# Patient Record
Sex: FEMALE | Race: WHITE | NOT HISPANIC OR LATINO | Employment: FULL TIME | URBAN - METROPOLITAN AREA
[De-identification: names, ages, dates, MRNs, and addresses within clinical notes are randomized per-mention and may not be internally consistent; named-entity substitution may affect disease eponyms.]

---

## 2020-09-24 ENCOUNTER — TELEPHONE (OUTPATIENT)
Dept: OBGYN CLINIC | Facility: CLINIC | Age: 25
End: 2020-09-24

## 2020-09-24 NOTE — TELEPHONE ENCOUNTER
Spoke to [employer]  In network  Ins  Eff  4-1-2020  No copay  Ded  $2500  Oop $6850    $[phone] applied  Co ins   70/30  [phone]

## 2020-10-14 ENCOUNTER — INITIAL PRENATAL (OUTPATIENT)
Dept: OBGYN CLINIC | Facility: CLINIC | Age: 25
End: 2020-10-14

## 2020-10-14 VITALS — WEIGHT: 163 LBS | TEMPERATURE: 98.7 F

## 2020-10-14 DIAGNOSIS — Z3A.08 8 WEEKS GESTATION OF PREGNANCY: Primary | ICD-10-CM

## 2020-10-14 PROCEDURE — 87491 CHLMYD TRACH DNA AMP PROBE: CPT | Performed by: PHYSICIAN ASSISTANT

## 2020-10-14 PROCEDURE — 87591 N.GONORRHOEAE DNA AMP PROB: CPT | Performed by: PHYSICIAN ASSISTANT

## 2020-10-14 PROCEDURE — 87661 TRICHOMONAS VAGINALIS AMPLIF: CPT | Performed by: PHYSICIAN ASSISTANT

## 2020-10-14 PROCEDURE — G0145 SCR C/V CYTO,THINLAYER,RESCR: HCPCS | Performed by: PHYSICIAN ASSISTANT

## 2020-10-14 PROCEDURE — PNV: Performed by: PHYSICIAN ASSISTANT

## 2020-10-17 LAB
C TRACH DNA SPEC QL NAA+PROBE: NEGATIVE
N GONORRHOEA DNA SPEC QL NAA+PROBE: NEGATIVE
T VAGINALIS RRNA SPEC QL NAA+PROBE: NEGATIVE

## 2020-10-19 ENCOUNTER — TELEPHONE (OUTPATIENT)
Dept: OBGYN CLINIC | Facility: CLINIC | Age: 25
End: 2020-10-19

## 2020-10-19 LAB
EXTERNAL HEPATITIS B SURFACE ANTIGEN: NON REACTIVE
EXTERNAL HIV-1/2 AB-AG: NORMAL
EXTERNAL RUBELLA IGG QUANTITATION: NORMAL
EXTERNAL SYPHILIS RPR SCREEN: NORMAL

## 2020-10-21 LAB
ABO GROUP BLD: NORMAL
APPEARANCE UR: CLEAR
BACTERIA UR CULT: NORMAL
BACTERIA URNS QL MICRO: NORMAL
BASOPHILS # BLD AUTO: 0 X10E3/UL (ref 0–0.2)
BASOPHILS NFR BLD AUTO: 0 %
BILIRUB UR QL STRIP: NEGATIVE
BLD GP AB SCN SERPL QL: NEGATIVE
COLOR UR: YELLOW
EOSINOPHIL # BLD AUTO: 0.1 X10E3/UL (ref 0–0.4)
EOSINOPHIL NFR BLD AUTO: 1 %
EPI CELLS #/AREA URNS HPF: NORMAL /HPF (ref 0–10)
ERYTHROCYTE [DISTWIDTH] IN BLOOD BY AUTOMATED COUNT: 12 % (ref 11.7–15.4)
GLUCOSE UR QL: NEGATIVE
HBV SURFACE AG SERPL QL IA: NEGATIVE
HCT VFR BLD AUTO: 39.7 % (ref 34–46.6)
HCV AB S/CO SERPL IA: <0.1 S/CO RATIO (ref 0–0.9)
HGB BLD-MCNC: 13.7 G/DL (ref 11.1–15.9)
HGB UR QL STRIP: NEGATIVE
HIV 1+2 AB+HIV1 P24 AG SERPL QL IA: NON REACTIVE
IMM GRANULOCYTES # BLD: 0 X10E3/UL (ref 0–0.1)
IMM GRANULOCYTES NFR BLD: 0 %
KETONES UR QL STRIP: NEGATIVE
LAB AP GYN PRIMARY INTERPRETATION: NORMAL
LEUKOCYTE ESTERASE UR QL STRIP: NEGATIVE
LYMPHOCYTES # BLD AUTO: 1.1 X10E3/UL (ref 0.7–3.1)
LYMPHOCYTES NFR BLD AUTO: 15 %
Lab: NO GROWTH
Lab: NORMAL
MCH RBC QN AUTO: 32.9 PG (ref 26.6–33)
MCHC RBC AUTO-ENTMCNC: 34.5 G/DL (ref 31.5–35.7)
MCV RBC AUTO: 95 FL (ref 79–97)
MICRO URNS: NORMAL
MICRO URNS: NORMAL
MONOCYTES # BLD AUTO: 0.4 X10E3/UL (ref 0.1–0.9)
MONOCYTES NFR BLD AUTO: 6 %
NEUTROPHILS # BLD AUTO: 5.6 X10E3/UL (ref 1.4–7)
NEUTROPHILS NFR BLD AUTO: 78 %
NITRITE UR QL STRIP: NEGATIVE
PH UR STRIP: 6.5 [PH] (ref 5–7.5)
PLATELET # BLD AUTO: 242 X10E3/UL (ref 150–450)
PROT UR QL STRIP: NEGATIVE
RBC # BLD AUTO: 4.16 X10E6/UL (ref 3.77–5.28)
RBC #/AREA URNS HPF: NORMAL /HPF (ref 0–2)
RH BLD: POSITIVE
RPR SER QL: NON REACTIVE
RUBV IGG SERPL IA-ACNC: 11.4 INDEX
SP GR UR: 1.01 (ref 1–1.03)
UROBILINOGEN UR STRIP-ACNC: 0.2 MG/DL (ref 0.2–1)
WBC # BLD AUTO: 7.3 X10E3/UL (ref 3.4–10.8)
WBC #/AREA URNS HPF: NORMAL /HPF (ref 0–5)

## 2020-11-09 ENCOUNTER — ROUTINE PRENATAL (OUTPATIENT)
Dept: OBGYN CLINIC | Facility: CLINIC | Age: 25
End: 2020-11-09

## 2020-11-09 VITALS — WEIGHT: 163 LBS | DIASTOLIC BLOOD PRESSURE: 60 MMHG | SYSTOLIC BLOOD PRESSURE: 100 MMHG | TEMPERATURE: 98 F

## 2020-11-09 DIAGNOSIS — Z3A.14 PREGNANCY WITH 14 COMPLETED WEEKS GESTATION: Primary | ICD-10-CM

## 2020-11-09 PROCEDURE — PNV: Performed by: OBSTETRICS & GYNECOLOGY

## 2020-12-07 ENCOUNTER — ROUTINE PRENATAL (OUTPATIENT)
Dept: OBGYN CLINIC | Facility: CLINIC | Age: 25
End: 2020-12-07

## 2020-12-07 VITALS — DIASTOLIC BLOOD PRESSURE: 60 MMHG | SYSTOLIC BLOOD PRESSURE: 100 MMHG | WEIGHT: 165 LBS | TEMPERATURE: 97.4 F

## 2020-12-07 DIAGNOSIS — Z3A.18 PREGNANCY WITH 18 COMPLETED WEEKS GESTATION: Primary | ICD-10-CM

## 2020-12-07 PROCEDURE — PNV: Performed by: OBSTETRICS & GYNECOLOGY

## 2020-12-28 ENCOUNTER — TELEPHONE (OUTPATIENT)
Dept: PERINATAL CARE | Facility: CLINIC | Age: 25
End: 2020-12-28

## 2020-12-29 ENCOUNTER — ROUTINE PRENATAL (OUTPATIENT)
Dept: PERINATAL CARE | Facility: CLINIC | Age: 25
End: 2020-12-29
Payer: COMMERCIAL

## 2020-12-29 VITALS
WEIGHT: 168 LBS | BODY MASS INDEX: 29.77 KG/M2 | HEART RATE: 111 BPM | HEIGHT: 63 IN | DIASTOLIC BLOOD PRESSURE: 72 MMHG | SYSTOLIC BLOOD PRESSURE: 115 MMHG

## 2020-12-29 DIAGNOSIS — Z36.86 ENCOUNTER FOR ANTENATAL SCREENING FOR CERVICAL LENGTH: ICD-10-CM

## 2020-12-29 DIAGNOSIS — Z36.3 ENCOUNTER FOR ANTENATAL SCREENING FOR MALFORMATIONS: Primary | ICD-10-CM

## 2020-12-29 DIAGNOSIS — Z3A.21 21 WEEKS GESTATION OF PREGNANCY: ICD-10-CM

## 2020-12-29 PROCEDURE — 99213 OFFICE O/P EST LOW 20 MIN: CPT | Performed by: OBSTETRICS & GYNECOLOGY

## 2020-12-29 PROCEDURE — 76817 TRANSVAGINAL US OBSTETRIC: CPT | Performed by: OBSTETRICS & GYNECOLOGY

## 2020-12-29 PROCEDURE — 76805 OB US >/= 14 WKS SNGL FETUS: CPT | Performed by: OBSTETRICS & GYNECOLOGY

## 2021-01-04 ENCOUNTER — ROUTINE PRENATAL (OUTPATIENT)
Dept: OBGYN CLINIC | Facility: CLINIC | Age: 26
End: 2021-01-04

## 2021-01-04 VITALS — DIASTOLIC BLOOD PRESSURE: 68 MMHG | SYSTOLIC BLOOD PRESSURE: 120 MMHG | BODY MASS INDEX: 30.29 KG/M2 | WEIGHT: 171 LBS

## 2021-01-04 DIAGNOSIS — Z34.92 SECOND TRIMESTER PREGNANCY: Primary | ICD-10-CM

## 2021-01-04 PROCEDURE — PNV: Performed by: PHYSICIAN ASSISTANT

## 2021-01-04 NOTE — PROGRESS NOTES
Pt feels well  Had anatomy scan last week at [facility]  Some missed cardiac views, otherwise all WNL  Pt has decided against QS and AFP  Declines flu  Reviewed recommendations for flu and tdap    + daily FM noted  No cramping, no vb/lof, no n/v/ha   No edema,   Urine neg/neg

## 2021-02-05 ENCOUNTER — ROUTINE PRENATAL (OUTPATIENT)
Dept: OBGYN CLINIC | Facility: CLINIC | Age: 26
End: 2021-02-05

## 2021-02-05 ENCOUNTER — HOSPITAL ENCOUNTER (OUTPATIENT)
Facility: HOSPITAL | Age: 26
Discharge: HOME | End: 2021-02-05
Attending: OBSTETRICS & GYNECOLOGY | Admitting: OBSTETRICS & GYNECOLOGY
Payer: COMMERCIAL

## 2021-02-05 ENCOUNTER — NURSE TRIAGE (OUTPATIENT)
Dept: OTHER | Facility: OTHER | Age: 26
End: 2021-02-05

## 2021-02-05 VITALS
TEMPERATURE: 97.1 F | BODY MASS INDEX: 32.42 KG/M2 | DIASTOLIC BLOOD PRESSURE: 80 MMHG | SYSTOLIC BLOOD PRESSURE: 130 MMHG | WEIGHT: 183 LBS

## 2021-02-05 VITALS
BODY MASS INDEX: 32.43 KG/M2 | SYSTOLIC BLOOD PRESSURE: 142 MMHG | OXYGEN SATURATION: 100 % | WEIGHT: 183 LBS | DIASTOLIC BLOOD PRESSURE: 76 MMHG | HEIGHT: 63 IN | RESPIRATION RATE: 16 BRPM | TEMPERATURE: 98.7 F | HEART RATE: 88 BPM

## 2021-02-05 DIAGNOSIS — Z34.02 ENCOUNTER FOR SUPERVISION OF NORMAL FIRST PREGNANCY IN SECOND TRIMESTER: Primary | ICD-10-CM

## 2021-02-05 PROBLEM — Z3A.26 26 WEEKS GESTATION OF PREGNANCY: Status: ACTIVE | Noted: 2021-02-05

## 2021-02-05 LAB
BACTERIA UR QL AUTO: NORMAL /HPF
BILIRUB UR QL STRIP: NEGATIVE
CLARITY UR: CLEAR
COLOR UR: YELLOW
GLUCOSE UR STRIP-MCNC: NEGATIVE MG/DL
HGB UR QL STRIP.AUTO: NEGATIVE
KETONES UR STRIP-MCNC: NEGATIVE MG/DL
LEUKOCYTE ESTERASE UR QL STRIP: NEGATIVE
NITRITE UR QL STRIP: NEGATIVE
NON-SQ EPI CELLS URNS QL MICRO: NORMAL /HPF
PH UR STRIP.AUTO: 6 [PH]
PROT UR STRIP-MCNC: NEGATIVE MG/DL
RBC #/AREA URNS AUTO: NORMAL /HPF
SP GR UR STRIP.AUTO: <=1.005 (ref 1–1.03)
UROBILINOGEN UR QL STRIP.AUTO: 0.2 E.U./DL
WBC #/AREA URNS AUTO: NORMAL /HPF

## 2021-02-05 PROCEDURE — 76815 OB US LIMITED FETUS(S): CPT | Performed by: OBSTETRICS & GYNECOLOGY

## 2021-02-05 PROCEDURE — 99214 OFFICE O/P EST MOD 30 MIN: CPT

## 2021-02-05 PROCEDURE — NC001 PR NO CHARGE: Performed by: OBSTETRICS & GYNECOLOGY

## 2021-02-05 PROCEDURE — 81001 URINALYSIS AUTO W/SCOPE: CPT | Performed by: OBSTETRICS & GYNECOLOGY

## 2021-02-05 PROCEDURE — PNV: Performed by: NURSE PRACTITIONER

## 2021-02-05 NOTE — PROGRESS NOTES
OFFICE VISIT: Denies any N/V, HA, Cramping, VB, LOF, Edema, Domestic Violence, Smoking  + FM  Tolerating PNV  Has repeat scan for missed anatomy scheduled  Rx for 28 labs given  RTO 2 weeks or sooner as needed

## 2021-02-05 NOTE — TELEPHONE ENCOUNTER
Regarding: Possible amniotic leak  ----- Message from Yoseph Hernandez sent at 2/5/2021  5:57 PM EST -----  "I'm currently 26 weeks pregnant and I think my amniotic fluid is leaking   I started to notice this yesterday "

## 2021-02-05 NOTE — TELEPHONE ENCOUNTER
Reason for Disposition   Possible incontinence of urine, BUT patient uncertain    Additional Information   Leakage of fluid (trickle, gush) from the vagina    Answer Assessment - Initial Assessment Questions  1  DISCHARGE: "Describe the discharge " (e g , white, yellow, green, gray, foamy, cottage cheese-like)      Clear, wet underwear, "not a lot"  2  ODOR: "Is there a bad odor?"      Denies  3  ONSET: "When did the discharge begin?"      Yesterday  4  RASH: "Is there a rash in that area?" If so, ask: "Describe it " (e g , redness, blisters, sores, bumps)      Denies  5  ABDOMINAL PAIN: "Are you having any abdominal pain?" If yes: "What does it feel like?" (e g , crampy, dull, intermittent, constant)       Denies  6  CAUSE: "What do you think is causing the discharge?"      Amniotic fluid  7  OTHER SYMPTOMS: "Do you have any other symptoms?" (e g , fever, itching, vaginal bleeding, pain with urination)      Denies  8  EMMANUEL: "What date are you expecting to deliver?"       5/9/2021  9   PREGNANCY: "How many weeks pregnant are you?"        26 weeks    Protocols used: PREGNANCY - RUPTURE OF MEMBRANES-ADULT-, PREGNANCY - VAGINAL DISCHARGE-ADULT-AH

## 2021-02-05 NOTE — TELEPHONE ENCOUNTER
Reason for Disposition   Leakage of fluid from vagina    Protocols used: PREGNANCY - RUPTURE OF [facility]

## 2021-02-06 NOTE — PROGRESS NOTES
L&D Triage Note - OB/GYN  León Amador 30 y o  female MRN: [id number]  Unit/Bed#:  TRIAGE 2 Encounter: [id number]    León Amador is a patient of [facility]    SUBJECTIVE    EMMANUEL: Estimated Date of Delivery: 21    HPI:  30 y o   26w5d presents with complaint of leaking fluid since yesterday  Patient has required a pad  She states the fluid is odorless and colorless  She denies any recent intercourse dysuria irritation foul discharge or bleeding  She reports good fetal movement and denies contractions  Her current obstetrical history is uncomplicated      ROS:  Constitutional: Negative  Respiratory: Negative  Cardiovascular: Negative    Gastrointestinal: Negative    OBJECTIVE:  /76 (BP Location: Right arm)   Pulse 88   Temp 98 7 °F (37 1 °C) (Oral)   Resp 16   Ht 5' 3" (1 6 m)   Wt 83 kg (183 lb)   LMP 2020 (Exact Date)   SpO2 100%   BMI 32 42 kg/m²   Body mass index is 32 42 kg/m²  Physical Exam  Constitutional:       Appearance: Normal appearance  Cardiovascular:      Rate and Rhythm: Normal rate and regular rhythm  Pulmonary:      Breath sounds: Normal breath sounds  Abdominal:      General: There is no distension  Palpations: Abdomen is soft  Neurological:      Mental Status: She is alert  Speculum exam:  No pooling is noted on speculum exam   Cervix is visualized and is closed  No leaking from the cervix with Valsalva       [address]:     Infection:   - negative clue cells    - negative hyphae   - negative trichomonads present    Membrane status   - negative ferning   - negative nitrazene   - negative pooling     SVE: c/th/hi  Method: Manual  OB Examiner: DeFruscio    FHT:  Baseline Rate: 135 bpm  Variability: Moderate 6-25 bpm  Accelerations: 15 x 15 or greater  Decelerations: None  FHR Category: Category I    TOCO:   Contraction Frequency (minutes): None    IMAGING:       TAUS   RACHAEL:15     Labs: No results found for this or any previous visit (from the past 24 hour(s))  Patient is seen by Dr Vi Arteagaw is a 30 y o   at 26w5d here for rule out rupture    PLAN  #1  Rule out rupture  · Fetal heart tracing  · Ruffin  · UA  · KOH/wet prep  · Ferning/pulling/nitrazine- negative  · RACHAEL reassuring    Rupture ruled out at this time  Her workup is negative and RACHAEL is reassuring at 15  Her fetal heart tracing is reactive and there are no contractions noted on tocometry  Her UA is negative  Discharge instructions  · Patient instructed to call if experiencing worsening contractions, vaginal bleeding, loss of fluid or decreased fetal movement  · Will follow up with OBGYN as scheduled      D/w Dr Al Garcia MD  OB/GYN PGY-1  2021  9:44 PM

## 2021-02-06 NOTE — PROCEDURES
Sara Roman, a  at 26w5d with an EMMANUEL of 2021, by Last Menstrual Period, was seen at [address] for the following procedure(s): $Procedure Type: RACHAEL]         4 Quadrant RACHAEL  RACHAEL Q1 (cm): 4 cm  RACHAEL Q2 (cm): 4 7 cm  RACHAEL Q3 (cm): 4 7 cm  RACHAEL Q4 (cm): 2 3 cm  RACHAEL TOTAL (cm): 15 7 cm

## 2021-02-11 LAB
BASOPHILS # BLD AUTO: 0 X10E3/UL (ref 0–0.2)
BASOPHILS NFR BLD AUTO: 0 %
EOSINOPHIL # BLD AUTO: 0 X10E3/UL (ref 0–0.4)
EOSINOPHIL NFR BLD AUTO: 0 %
ERYTHROCYTE [DISTWIDTH] IN BLOOD BY AUTOMATED COUNT: 12.9 % (ref 11.7–15.4)
GLUCOSE 1H P 50 G GLC PO SERPL-MCNC: 129 MG/DL (ref 65–139)
HCT VFR BLD AUTO: 34.1 % (ref 34–46.6)
HGB BLD-MCNC: 11.5 G/DL (ref 11.1–15.9)
IMM GRANULOCYTES # BLD: 0 X10E3/UL (ref 0–0.1)
IMM GRANULOCYTES NFR BLD: 0 %
LYMPHOCYTES # BLD AUTO: 0.8 X10E3/UL (ref 0.7–3.1)
LYMPHOCYTES NFR BLD AUTO: 9 %
MCH RBC QN AUTO: 33.2 PG (ref 26.6–33)
MCHC RBC AUTO-ENTMCNC: 33.7 G/DL (ref 31.5–35.7)
MCV RBC AUTO: 99 FL (ref 79–97)
MONOCYTES # BLD AUTO: 0.4 X10E3/UL (ref 0.1–0.9)
MONOCYTES NFR BLD AUTO: 5 %
NEUTROPHILS # BLD AUTO: 6.9 X10E3/UL (ref 1.4–7)
NEUTROPHILS NFR BLD AUTO: 86 %
PLATELET # BLD AUTO: 209 X10E3/UL (ref 150–450)
RBC # BLD AUTO: 3.46 X10E6/UL (ref 3.77–5.28)
WBC # BLD AUTO: 8.1 X10E3/UL (ref 3.4–10.8)

## 2021-02-17 ENCOUNTER — ROUTINE PRENATAL (OUTPATIENT)
Dept: OBGYN CLINIC | Facility: CLINIC | Age: 26
End: 2021-02-17

## 2021-02-17 VITALS — DIASTOLIC BLOOD PRESSURE: 78 MMHG | BODY MASS INDEX: 32.59 KG/M2 | WEIGHT: 184 LBS | SYSTOLIC BLOOD PRESSURE: 128 MMHG

## 2021-02-17 DIAGNOSIS — Z34.03 ENCOUNTER FOR SUPERVISION OF NORMAL FIRST PREGNANCY IN THIRD TRIMESTER: Primary | ICD-10-CM

## 2021-02-17 PROCEDURE — PNV: Performed by: NURSE PRACTITIONER

## 2021-02-17 NOTE — PROGRESS NOTES
OFFICE VISIT: Denies any N/V, HA, Cramping, VB, LOF, Edema, Domestic Violence, Smoking  + FM  Tolerating PNV  States sharp pain in bottom of her foot when walking barefoot  Does not remember any injury, denies any swelling  Recommend seeing PCP  28 week folder reviewed  Has upcoming ultrasound for missed anatomy  Urine neg/neg  RTO 2 weeks or sooner as needed

## 2021-03-03 ENCOUNTER — ROUTINE PRENATAL (OUTPATIENT)
Dept: OBGYN CLINIC | Facility: CLINIC | Age: 26
End: 2021-03-03

## 2021-03-03 VITALS
WEIGHT: 192 LBS | SYSTOLIC BLOOD PRESSURE: 124 MMHG | DIASTOLIC BLOOD PRESSURE: 70 MMHG | TEMPERATURE: 97.7 F | BODY MASS INDEX: 34.01 KG/M2

## 2021-03-03 DIAGNOSIS — Z3A.30 30 WEEKS GESTATION OF PREGNANCY: ICD-10-CM

## 2021-03-03 DIAGNOSIS — Z34.03 ENCOUNTER FOR PRENATAL CARE IN THIRD TRIMESTER OF FIRST PREGNANCY: Primary | ICD-10-CM

## 2021-03-03 PROCEDURE — PNV: Performed by: OBSTETRICS & GYNECOLOGY

## 2021-03-03 NOTE — PATIENT INSTRUCTIONS
Vaccine Decision Making  Im pregnant  Should I get a COVID vaccine? The information here is about the [place] and Moderna COVID-19 vaccines  These are also called mRNA vaccines  Reference numbers written like this (#) correspond to the footnotes at the end of this section  For most people, getting the COVID vaccine as soon as possible is the safest choice  However, these vaccines have not been tested in pregnant and breastfeeding women yet  The information below will help you make an informed choice about whether to get an mRNA COVID vaccine while you are pregnant or trying to get pregnant  Your options:   Get a COVID vaccine as soon as it is available    Wait for more information about the vaccines in pregnancy    What are the benefits of getting an mRNA COVID Vaccine? 1  COVID is dangerous  It is more dangerous for pregnant women   COVID patients who are pregnant are 5 times more likely to end up in the intensive care unit (ICU) or on a ventilator than COVID patients who are not pregnant  (#1)    birth may be more common for pregnant women with severe COVID  (#2)   Pregnant women are more likely to die of COVID than non-pregnant women with COVID who are the same age  (#3,4)    2  The mRNA COVID vaccines prevent about 95% of COVID infections   As COVID infections go up in our communities, your risk of getting COVID goes up too   Getting a vaccine will prevent you from getting COVID and may help keep you from giving COVID to people around you, like your family  3  The mRNA COVID vaccines cannot give you COVID   These vaccines have no live virus  (#5)   These vaccines do NOT contain ingredients that are known to be harmful to pregnant women or to the fetus   Many vaccines are routinely given in pregnancy and are safe (for example: tetanus, diphtheria, and flu)    More details about how these vaccines work can be found below in the section "More information about mRNA COVID vaccines"  What are the risks of getting an mRNA COVID vaccine? 1  These COVID vaccines have not yet been tested in pregnant people   These vaccines were tested in over 40,000 people, and there were no serious side effects related to the vaccine   We do not know if the vaccines work as well in pregnant people as they did in non-pregnant people   We do not know whether there are unique downsides in pregnancy, like different side effects or an increased risk of miscarriage or fetal abnormalities   The Moderna vaccine was tested in female rats to look at its effects on pregnancy  No significant negative effects were found on female fertility or fetal development   Some women became pregnant during the vaccine studies  Eighteen of these women were in the vaccine group, and two months later none had miscarried  There were [de-identified] women in the placebo group who became pregnant, and two months later two of them had had miscarriages  (In general, 10-20% of pregnancies end in miscarriage)   Because these studies are still ongoing, we dont know how the rest of the pregnancy went for these women  2  People getting the vaccine will probably have some side effects   Many people had symptoms caused by their immune systems normal response to the vaccine  The most common side effects were:(#6)   injection site reactions like sore arm (~84%)   fatigue (~62%)   headache (~55%)   muscle pain (~38%)   chills (~32%)   joint pain (~24%)   fever (~14%)   Of 100 people who get the vaccine, 1 will get a high fever (over 102°F)  A persistent high fever during the first trimester might increase the risk of fetal abnormalities or miscarriage  The CDC recommends using Tylenol (acetaminophen) during pregnancy if you have a high fever  Another option is to delay your COVID vaccine until after the first trimester  What do the experts recommend?   Because COVID is dangerous and easily spread, the CDC says that the mRNA vaccines for COVID-19 are recommended for adults  (#7)  However, because there are no studies of pregnant women yet, there are no clear recommendations for pregnant women  This is standard for a new drug and is not due to any particular concern with this vaccine  The Society for Maternal-Fetal Medicine strongly recommends that pregnant individuals have access to COVID vaccines  They recommend that each person talk to their doctor or midwife about their own personal choice  (#8)    The [address] and Gynecologists recommends that the COVID vaccine should not be withheld from pregnant individuals  (#9)    What else should I think about to help me decide? Make sure you understand as much as you can about COVID and about the vaccine  Ask a trusted source, like your midwife or doctor  Continue reading below for more information about the vaccine  Think about your own personal risk  Look at the columns below and think about your risk of getting COVID (left)  Think about your safety - are you able to stay safe (right)? The risks of getting sick from [facility]  are higher if If you are not at higher risk for COVID  and   ?? You have contact with people  outside your home ? ? You are always able to wear a mask   ? ? You are 28years old or older ? ? You and the people you live with  can socially distance from others for  your whole pregnancy   ? ? You are overweight ? ? Your community does NOT have  high or increasing COVID cases   ? ? You have other medical problems  like diabetes, high blood pressure,  or heart disease ? ? You think the vaccine itself will make  you very nervous (you are more  worried about the unknown risks  than about getting COVID)   ? ? You are a smoker ? ? You have had a severe allergic  reaction to a vaccine   ? ? You are a racial or ethnic minority, or your community has a high rate of COVID infections    ? ?  You are a healthcare worker(#10)    If you are at a higher risk of getting  COVID, it probably makes sense to get  the vaccine   it might make sense for you to wait  for more information  What about breastfeeding? The Society for [address] [place] [address] Street of Breastfeeding Medicine report that there is no reason to believe that the vaccine affects the safety of breastmilk  8,11 The vaccine does not contain the virus, so there is no risk of infecting your baby  Because mRNA is fragile, it is very unlikely that any part of the vaccine gets into breastmilk  When we have an infection or get a vaccine, our bodies make antibodies to fight the infection  Antibodies can pass into the breastmilk and then to the baby - and may help prevent infections  Summary  1  COVID seems to cause more harm in pregnant women than in women of the same age who are not pregnant  2  The risks of getting an mRNA COVID vaccine during pregnancy are thought to be small but are not totally known  3  You should consider your own personal risk of getting COVID  If your personal risk is high, or there are many cases of COVID in your community, it probably makes sense for you to get a vaccine while pregnant  4  Whether to get a COVID vaccine during pregnancy is your choice  What do pregnant doctors think? 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, so for me the choice is clear, I intend to be first in line as soon as they will let me have one " (Pregnant Emergency Department Doctor)    "I am a little nervous about getting something that hasnt been tested in pregnant patients  Early pregnancy is a nerve-wracking time, even without the unknown of a new vaccine  So, I went over the risks and benefits of getting or not getting it as a front- - with myself, my partner, and my doctors   We ended up deciding I should get the vaccine " (Pregnant Emergency Department Doctor)    "Im 34 weeks and I'm going to try to get vaccinated after delivery, but during pregnancy I'm holding out  Pregnant women were excluded from the studies and, in the meantime, I don't see [place] patients at work so I feel like my exposure will be low during this second wave " (Pregnant physician)    "I am still breastfeeding my baby, and I think the risk of exposing my infant and other children and partner to Vanesa is far greater than any theoretical risk this novel vaccine may have  Ive decided to get vaccinated whenever it becomes available " (Breastfeeding OB/GYN Doctor)      Do you have more questions? Call your doctor or midwife to talk about your own personal decision  Thoughts about this tool? Was this decision aid helpful? Please take a moment to give us feedback about this decision aid at [email or web address] or by scanning the QR code below  We need your help! Tell the [facility] about your experience with the vaccine  If you decide to get the vaccine, you will get a V-safe information sheet with instructions about the V-safe website and karen  Consider registering so we can better  women in the future  More information about mRNA COVID Vaccines  How do mRNA COVID vaccines work?  The Pfizer and Moderna COVID vaccines are mRNA vaccines (messenger RNA)   mRNA is not new - our bodies are full of it  mRNA vaccines have been studied for the past two decades   mRNA vaccines mimic how viruses work  The mRNA is like a recipe card that goes into your body and makes one recipe for a brief time  The recipe is for a small part of the virus (the spike protein)   When this spike protein is released from cells, the body recognizes it as foreign and the immune system responds  This immune response causes the side effect symptoms (like aches and fever) but leads to improved immunity   mRNA breaks down quickly, so it only lasts a brief time     This is also how the other viruses like a cold virus work - viruses use our body and cells to make their proteins  Then our immune system attacks those proteins to keep us healthy   There is no way for the vaccine to give people COVID  (#5)    What did the research show? The Pfizer and Moderna mRNA vaccine trials each had over 30,000 people (including those who got placebo) and showed that the vaccine lowers a persons chance of getting COVID and severe COVID  In each study, over 15,000 people got the vaccine and over 15,000 people got a saline injection (placebo)   After one dose, the vaccine appears to be 50% effective  After 2 doses, both vaccines are about 95% effective   In other words, for every 100 people who got COVID in the placebo group, only 5 people got COVID in the mRNA vaccine groups   Severe cases of COVID were also reduced in both mRNA vaccine groups   There were no serious safety concerns  Intended Use   This decision aid is intended for use by pregnant women (and women planning on becoming pregnant) who are considering getting the COVID-19 vaccine, as well as their  healthcare providers, and their friends and family  It was created by the Shared Decision-Making: COVID Vaccination in Pregnancy working group at the [facility]  This group consists of experts in the fields of OB/GYN, Maternal-Fetal Medicine, Shared Decision-Making and risk communication, Emergency Medicine, and current COVID-19 research  Questions should be directed to Dr [email or web address]  Feedback regarding the utility of this decision aid can be directed through the survey (see link above)  This decision aid can be reproduced and distributed without additional permission  [place] and [place] versions available at http://foamcast org/COVIDvacPregnancy  Updated December 22, 2020  1   Joe HORVATH, et al  Pregnant women with severe or critical COVID-19 have increased composite morbidity compared to  non-pregnant matched controls  Am J Obstet 2020 doi: 10 1016/j ajog  11 022  2  Gladys CHRISTIAN, et al  Pregnancy outcomes among women with and without severe acute respiratory syndrome coronavirus  2 infection  [facility]  Nov 3(11):[id number]  3  ALEXYS Xiao working group on COVID-19  Maternal and  Outcomes of Pregnancy Women with SARScoV-  2 infection  Ultrasound Obstet Gynecol  2020  doi: 10 1002/uog  37249  4  Centers for Disease Control and Prevention  Update: Characteristics of Symptomatic Women of Reproductive Age with  Laboratory-Confirmed SARS-CoV-2 Infection by Pregnancy Status -- [place], -October 3, 2020  2020:1-7   5  Denae MARSH  COVID-19 and mRNA Vaccines--First Large Test for a New Approach  SARAHI  2020;324(12):3469-3721  doi:10 1001/sarahi  0531 [id number]  6  [facility]  7  [employer] com br ([address], )  8  SM statement on COVID vaccination in pregnancy: https://www  fm org/publications/339-society-for-maternal-fetalmedicine-  smfm-statement-sars-cov-2-vaccination-in-pregnancy  9  RelayThis com au-  Lactating-Patients-Against-COVID-19 (Accessed 2020)  10  Nicolasa Lake et al  Occupation and risk of severe COVID-19: prospective cohort study of  [facility] participants  Occupational and Environmental Medicine Published Online First: 2020   doi: 10 1136/iqysj-6891-975867  11  [email or web address]://abm memberclicks net/luo-mggayukrk-dlfyfzajzavwtr-for-covid-19-vaccination-in-lactation

## 2021-03-03 NOTE — PROGRESS NOTES
Visit:  Good FM - some edema in ankles but resolves with rest - tolerating PNV - has f/u MFM growth US - given checking in information - urine neg / neg - reviewed TDap

## 2021-03-13 NOTE — PATIENT INSTRUCTIONS
Thank you for choosing us for your  care today  If you have any questions about your ultrasound or care, please do not hesitate to contact us or your primary obstetrician  Some general instructions for your pregnancy are:     Protect against coronavirus: Continue to practice social distancing, wear a mask, and wash your hands often  Pregnant women are increased risk of severe COVID  Notify your primary care doctor if you have any symptoms including cough, shortness of breath or difficulty breathing, fever, chills, muscle pain, sore throat, or loss of taste or smell  Pregnant women can receive the coronavirus vaccine   Exercise: Aim for 22 minutes per day (150 minutes per week) of regular exercise  Walking is great!  Nutrition: aim for calcium-rich and iron-rich foods as well as healthy sources of protein   Protect against the flu: get yourself and your entire household vaccinated against influenza  This will protect your baby   Learn about Preeclampsia: preeclampsia is a common, serious high blood pressure complication in pregnancy  A blood pressure of 493VMAN (systolic or top number) or 82WFPO (diastolic or bottom number) is not normal and needs evaluation by your doctor   If you smoke, try to reduce how many cigarettes you smoke or try to quit completely  Do not vape   Other warning signs to watch out for in pregnancy or postpartum: chest pain, obstructed breathing or shortness of breath, seizures, thoughts of hurting yourself or your baby, bleeding, a painful or swollen leg, fever, or headache (see AWHONN POST-BIRTH Warning Signs campaign)  If these happen call 911  Itching is also not normal in pregnancy and if you experience this, especially over your hands and feet, potentially worse at night, notify your doctors     Lastly, if you are contacted regarding participation in a survey about your experience in our office, please know that we take any feedback you provide seriously and use it to improve how we deliver care through our center

## 2021-03-15 ENCOUNTER — ULTRASOUND (OUTPATIENT)
Dept: PERINATAL CARE | Facility: CLINIC | Age: 26
End: 2021-03-15
Payer: COMMERCIAL

## 2021-03-15 VITALS
HEART RATE: 93 BPM | HEIGHT: 63 IN | DIASTOLIC BLOOD PRESSURE: 72 MMHG | WEIGHT: 199.2 LBS | BODY MASS INDEX: 35.3 KG/M2 | SYSTOLIC BLOOD PRESSURE: 131 MMHG

## 2021-03-15 DIAGNOSIS — IMO0002 EVALUATE ANATOMY NOT SEEN ON PRIOR SONOGRAM: ICD-10-CM

## 2021-03-15 DIAGNOSIS — Z36.89 ENCOUNTER FOR ULTRASOUND TO CHECK FETAL GROWTH: Primary | ICD-10-CM

## 2021-03-15 PROCEDURE — 76816 OB US FOLLOW-UP PER FETUS: CPT | Performed by: OBSTETRICS & GYNECOLOGY

## 2021-03-15 NOTE — PROGRESS NOTES
[address]: Ms Akiko Monaco was seen today at 32w1d for fetal growth and followup missed anatomy ultrasound  See ultrasound report under "OB Procedures" tab  Please don't hesitate to contact our office with any concerns or questions    Emile Olivarez MD

## 2021-03-17 ENCOUNTER — ROUTINE PRENATAL (OUTPATIENT)
Dept: OBGYN CLINIC | Facility: CLINIC | Age: 26
End: 2021-03-17

## 2021-03-17 VITALS
TEMPERATURE: 98 F | DIASTOLIC BLOOD PRESSURE: 60 MMHG | WEIGHT: 195 LBS | SYSTOLIC BLOOD PRESSURE: 100 MMHG | BODY MASS INDEX: 34.54 KG/M2

## 2021-03-17 DIAGNOSIS — Z34.02 ENCOUNTER FOR SUPERVISION OF NORMAL FIRST PREGNANCY IN SECOND TRIMESTER: Primary | ICD-10-CM

## 2021-03-17 PROCEDURE — PNV: Performed by: NURSE PRACTITIONER

## 2021-03-17 NOTE — PROGRESS NOTES
OFFICE VISIT: Denies any N/V, HA, Cramping, VB, LOF, Edema, Domestic Violence, Smoking  + FM  Tolerating PNV  Urine neg/neg  RTO 2 weeks or sooner as needed

## 2021-03-31 ENCOUNTER — ROUTINE PRENATAL (OUTPATIENT)
Dept: OBGYN CLINIC | Facility: CLINIC | Age: 26
End: 2021-03-31

## 2021-03-31 VITALS — WEIGHT: 200 LBS | SYSTOLIC BLOOD PRESSURE: 122 MMHG | DIASTOLIC BLOOD PRESSURE: 70 MMHG | BODY MASS INDEX: 35.43 KG/M2

## 2021-03-31 DIAGNOSIS — Z34.03 ENCOUNTER FOR SUPERVISION OF NORMAL FIRST PREGNANCY IN THIRD TRIMESTER: Primary | ICD-10-CM

## 2021-03-31 PROCEDURE — PNV: Performed by: NURSE PRACTITIONER

## 2021-03-31 NOTE — PROGRESS NOTES
OFFICE VISIT: Slight edema, resolves with rest  Mild intermittent cramping, nothing regular  Denies any N/V, HA, VB, LOF,  Domestic Violence, Smoking  + FM  Tolerating PNV   RTO 2 weeks for GBS or sooner as needed

## 2021-04-01 ENCOUNTER — HOSPITAL ENCOUNTER (INPATIENT)
Facility: HOSPITAL | Age: 26
LOS: 3 days | Discharge: HOME | End: 2021-04-04
Attending: OBSTETRICS & GYNECOLOGY | Admitting: OBSTETRICS & GYNECOLOGY
Payer: COMMERCIAL

## 2021-04-01 ENCOUNTER — NURSE TRIAGE (OUTPATIENT)
Dept: OTHER | Facility: OTHER | Age: 26
End: 2021-04-01

## 2021-04-01 DIAGNOSIS — O42.919 PRETERM PREMATURE RUPTURE OF MEMBRANES (PPROM) WITH UNKNOWN ONSET OF LABOR: ICD-10-CM

## 2021-04-01 DIAGNOSIS — Z3A.34 34 WEEKS GESTATION OF PREGNANCY: Primary | ICD-10-CM

## 2021-04-01 PROBLEM — Z3A.30 30 WEEKS GESTATION OF PREGNANCY: Status: RESOLVED | Noted: 2021-02-05 | Resolved: 2021-04-01

## 2021-04-01 LAB
ABO GROUP BLD: NORMAL
ABO GROUP BLD: NORMAL
AMPHETAMINES SERPL QL SCN: NEGATIVE
BACTERIA UR QL AUTO: ABNORMAL /HPF
BARBITURATES UR QL: NEGATIVE
BASOPHILS # BLD AUTO: 0.03 THOUSANDS/ΜL (ref 0–0.1)
BASOPHILS NFR BLD AUTO: 0 % (ref 0–1)
BENZODIAZ UR QL: NEGATIVE
BILIRUB UR QL STRIP: NEGATIVE
BLD GP AB SCN SERPL QL: NEGATIVE
CLARITY UR: ABNORMAL
COCAINE UR QL: NEGATIVE
COLOR UR: YELLOW
EOSINOPHIL # BLD AUTO: 0.01 THOUSAND/ΜL (ref 0–0.61)
EOSINOPHIL NFR BLD AUTO: 0 % (ref 0–6)
ERYTHROCYTE [DISTWIDTH] IN BLOOD BY AUTOMATED COUNT: 12.9 % (ref 11.6–15.1)
GLUCOSE UR STRIP-MCNC: NEGATIVE MG/DL
HCT VFR BLD AUTO: 39.7 % (ref 34.8–46.1)
HGB BLD-MCNC: 13.5 G/DL (ref 11.5–15.4)
HGB UR QL STRIP.AUTO: ABNORMAL
HOLD SPECIMEN: NORMAL
IMM GRANULOCYTES # BLD AUTO: 0.09 THOUSAND/UL (ref 0–0.2)
IMM GRANULOCYTES NFR BLD AUTO: 1 % (ref 0–2)
KETONES UR STRIP-MCNC: NEGATIVE MG/DL
LEUKOCYTE ESTERASE UR QL STRIP: NEGATIVE
LYMPHOCYTES # BLD AUTO: 0.66 THOUSANDS/ΜL (ref 0.6–4.47)
LYMPHOCYTES NFR BLD AUTO: 7 % (ref 14–44)
MCH RBC QN AUTO: 32.8 PG (ref 26.8–34.3)
MCHC RBC AUTO-ENTMCNC: 34 G/DL (ref 31.4–37.4)
MCV RBC AUTO: 97 FL (ref 82–98)
METHADONE UR QL: NEGATIVE
MONOCYTES # BLD AUTO: 0.52 THOUSAND/ΜL (ref 0.17–1.22)
MONOCYTES NFR BLD AUTO: 6 % (ref 4–12)
NEUTROPHILS # BLD AUTO: 7.94 THOUSANDS/ΜL (ref 1.85–7.62)
NEUTS SEG NFR BLD AUTO: 86 % (ref 43–75)
NITRITE UR QL STRIP: POSITIVE
NON-SQ EPI CELLS URNS QL MICRO: ABNORMAL /HPF
NRBC BLD AUTO-RTO: 0 /100 WBCS
OPIATES UR QL SCN: NEGATIVE
OXYCODONE+OXYMORPHONE UR QL SCN: NEGATIVE
PCP UR QL: NEGATIVE
PH UR STRIP.AUTO: 6 [PH]
PLATELET # BLD AUTO: 196 THOUSANDS/UL (ref 149–390)
PMV BLD AUTO: 9.3 FL (ref 8.9–12.7)
PROT UR STRIP-MCNC: NEGATIVE MG/DL
RBC # BLD AUTO: 4.11 MILLION/UL (ref 3.81–5.12)
RBC #/AREA URNS AUTO: ABNORMAL /HPF
RH BLD: POSITIVE
RH BLD: POSITIVE
RPR SER QL: NORMAL
SP GR UR STRIP.AUTO: 1.01 (ref 1–1.03)
SPECIMEN EXPIRATION DATE: NORMAL
THC UR QL: NEGATIVE
UROBILINOGEN UR QL STRIP.AUTO: 0.2 E.U./DL
WBC # BLD AUTO: 9.25 THOUSAND/UL (ref 4.31–10.16)
WBC #/AREA URNS AUTO: ABNORMAL /HPF

## 2021-04-01 PROCEDURE — 87150 DNA/RNA AMPLIFIED PROBE: CPT | Performed by: OBSTETRICS & GYNECOLOGY

## 2021-04-01 PROCEDURE — 86901 BLOOD TYPING SEROLOGIC RH(D): CPT | Performed by: OBSTETRICS & GYNECOLOGY

## 2021-04-01 PROCEDURE — 86850 RBC ANTIBODY SCREEN: CPT | Performed by: OBSTETRICS & GYNECOLOGY

## 2021-04-01 PROCEDURE — 81001 URINALYSIS AUTO W/SCOPE: CPT | Performed by: OBSTETRICS & GYNECOLOGY

## 2021-04-01 PROCEDURE — 99214 OFFICE O/P EST MOD 30 MIN: CPT | Performed by: OBSTETRICS & GYNECOLOGY

## 2021-04-01 PROCEDURE — 87591 N.GONORRHOEAE DNA AMP PROB: CPT | Performed by: OBSTETRICS & GYNECOLOGY

## 2021-04-01 PROCEDURE — 86592 SYPHILIS TEST NON-TREP QUAL: CPT | Performed by: OBSTETRICS & GYNECOLOGY

## 2021-04-01 PROCEDURE — 85025 COMPLETE CBC W/AUTO DIFF WBC: CPT | Performed by: OBSTETRICS & GYNECOLOGY

## 2021-04-01 PROCEDURE — 87491 CHLMYD TRACH DNA AMP PROBE: CPT | Performed by: OBSTETRICS & GYNECOLOGY

## 2021-04-01 PROCEDURE — 76816 OB US FOLLOW-UP PER FETUS: CPT | Performed by: OBSTETRICS & GYNECOLOGY

## 2021-04-01 PROCEDURE — 80307 DRUG TEST PRSMV CHEM ANLYZR: CPT | Performed by: OBSTETRICS & GYNECOLOGY

## 2021-04-01 PROCEDURE — NC001 PR NO CHARGE: Performed by: OBSTETRICS & GYNECOLOGY

## 2021-04-01 PROCEDURE — 86900 BLOOD TYPING SEROLOGIC ABO: CPT | Performed by: OBSTETRICS & GYNECOLOGY

## 2021-04-01 PROCEDURE — 59025 FETAL NON-STRESS TEST: CPT | Performed by: OBSTETRICS & GYNECOLOGY

## 2021-04-01 PROCEDURE — 99213 OFFICE O/P EST LOW 20 MIN: CPT

## 2021-04-01 RX ORDER — BETAMETHASONE SODIUM PHOSPHATE AND BETAMETHASONE ACETATE 3; 3 MG/ML; MG/ML
12 INJECTION, SUSPENSION INTRA-ARTICULAR; INTRALESIONAL; INTRAMUSCULAR; SOFT TISSUE EVERY 24 HOURS
Status: COMPLETED | OUTPATIENT
Start: 2021-04-01 | End: 2021-04-02

## 2021-04-01 RX ORDER — SODIUM CHLORIDE, SODIUM LACTATE, POTASSIUM CHLORIDE, CALCIUM CHLORIDE 600; 310; 30; 20 MG/100ML; MG/100ML; MG/100ML; MG/100ML
50 INJECTION, SOLUTION INTRAVENOUS CONTINUOUS
Status: DISCONTINUED | OUTPATIENT
Start: 2021-04-01 | End: 2021-04-02

## 2021-04-01 RX ORDER — SODIUM CHLORIDE, SODIUM LACTATE, POTASSIUM CHLORIDE, CALCIUM CHLORIDE 600; 310; 30; 20 MG/100ML; MG/100ML; MG/100ML; MG/100ML
125 INJECTION, SOLUTION INTRAVENOUS CONTINUOUS
Status: CANCELLED | OUTPATIENT
Start: 2021-04-01

## 2021-04-01 RX ORDER — ACETAMINOPHEN 325 MG/1
650 TABLET ORAL EVERY 4 HOURS PRN
Status: CANCELLED | OUTPATIENT
Start: 2021-04-01

## 2021-04-01 RX ORDER — ONDANSETRON 2 MG/ML
4 INJECTION INTRAMUSCULAR; INTRAVENOUS EVERY 8 HOURS PRN
Status: CANCELLED | OUTPATIENT
Start: 2021-04-01

## 2021-04-01 RX ORDER — AZITHROMYCIN 250 MG/1
1000 TABLET, FILM COATED ORAL EVERY 24 HOURS
Status: DISCONTINUED | OUTPATIENT
Start: 2021-04-01 | End: 2021-04-02

## 2021-04-01 RX ADMIN — SODIUM CHLORIDE, SODIUM LACTATE, POTASSIUM CHLORIDE, AND CALCIUM CHLORIDE 50 ML/HR: .6; .31; .03; .02 INJECTION, SOLUTION INTRAVENOUS at 10:43

## 2021-04-01 RX ADMIN — AZITHROMYCIN MONOHYDRATE 1000 MG: 250 TABLET ORAL at 16:26

## 2021-04-01 RX ADMIN — SODIUM CHLORIDE 2.5 MILLION UNITS: 9 INJECTION, SOLUTION INTRAVENOUS at 20:55

## 2021-04-01 RX ADMIN — BETAMETHASONE SODIUM PHOSPHATE AND BETAMETHASONE ACETATE 12 MG: 3; 3 INJECTION, SUSPENSION INTRA-ARTICULAR; INTRALESIONAL; INTRAMUSCULAR at 10:36

## 2021-04-01 RX ADMIN — SODIUM CHLORIDE 5 MILLION UNITS: 0.9 INJECTION, SOLUTION INTRAVENOUS at 10:49

## 2021-04-01 RX ADMIN — SODIUM CHLORIDE 2.5 MILLION UNITS: 9 INJECTION, SOLUTION INTRAVENOUS at 16:25

## 2021-04-01 NOTE — CONSULTS
Consultation - [facility]  Mony Durham 27 y o  female MRN: [id number]  Unit/Bed#: LD TRIAGE  Encounter: [id number]      Inpatient consult to Perinatology  Consult performed by: Akiko Nicholas MD  Consult ordered by: Manjit Uriarte MD        Chief Complaint   Patient presents with    Rupture of Membranes     0630 clear       History of Present Illness   Physician Requesting Consult: Santos Powers MD  Reason for Consult / Principal Problem: PPROM  Subspeciality: Perinatology    HPI:  Mony Durham is a 27 y o   female with an EMMANUEL of 2021, by Last Menstrual Period at 34w4d gestation who is being evaluated for PPROM  Patient reports that she had a sudden gush of clear fluid this morning at 0630  She reports that she continues to leak fluid  Work up completed by Dr Pradeep Patiño consistent with rupture of membranes  Patient reports some spotting with wiping immediately following rupture of membranes but otherwise no vaginal bleeding  She does not currently endorse any contractions  She reports appropriate fetal movement  She denies dysuria, fevers/chills, abdominal pain, chest pain, SOB      Review of Systems  All systems reviewed are negative    PREGNANCY COMPLICATIONS:  1) None    OB History    Para Term  AB Living   1 0 0 0 0 0   SAB TAB Ectopic Multiple Live Births   0 0 0 0 0      # Outcome Date GA Lbr Osvaldo/2nd Weight Sex Delivery Anes PTL Lv   1 Current                Baby complications/comments: None    Historical Information   Past Medical History:   Diagnosis Date    Varicella      Past Surgical History:   Procedure Laterality Date    WISDOM TOOTH EXTRACTION Bilateral      Social History   Social History     Substance and Sexual Activity   Alcohol Use Not Currently     Social History     Substance and Sexual Activity   Drug Use Never     Social History     Tobacco Use   Smoking Status Never Smoker   Smokeless Tobacco Never Used     Family History: non-contributory    Meds/Allergies Medications Prior to Admission   Medication    Prenatal Vit-DSS-Fe Cbn-FA (PRENATAL AD PO)        Allergies   Allergen Reactions    Ceclor [Cefaclor] Other (See Comments)     Childhood reaction       OBJECTIVE:    Vitals: Blood pressure 137/82, pulse (!) 121, temperature 98 1 °F (36 7 °C), temperature source Oral, resp  rate 20, height 5' 3" (1 6 m), weight 90 7 kg (200 lb), last menstrual period 08/02/2020, not currently breastfeeding  Body mass index is 35 43 kg/m²  Physical Exam  Not evaluated  GEN: NAD, AAO x 3  RESP: CTABL, No wheeze, rhonci, or stridor  CVS: RRR S1S2 nl, No murmurs, rubs, or gallops  ABD: Soft, NT, gravid, BS present  EXT: No c/c/e    Fetal heart rate: Baseline: 135 bpm, Variability: Moderate 6 - 25 bpm, Accelerations: Reactive and Decelerations: Absent  [place]: irregular    Prenatal Labs:   Blood Type: No results found for: ABO  , D (Rh type): No results found for: RH  , Antibody Screen: No results found for: ANTIBODYSCR , HCT/HGB:   Lab Results   Component Value Date    HCT 39 7 04/01/2021    HGB 13 5 04/01/2021   1 hour Glucola: 129  Rubella: Immune  VDRL/RPR: non-reactive  Hep B: Negative  HIV: Non-reactive   Group B Strep:  unknown      Assessment/Plan     ASSESSMENT:   27 y o  Vianca Tran  with IUP at 34w4d  gestation with PPROM  No concern for infection vs abruption vs hypertensive disorders of pregnancy as cause for PPROM  At this gestational age, can wait for steroid benefit prior to induction of labor in the setting of PPROM vs immediate induction       PLAN:   - PCN for GBS prophylaxis, GBS collected   - BTM for fetal lung maturity, will plan for induction starting 4/2/2021   - NICU consult   - If clinical status changes and expectant management becomes contraindicated, plan for delivery   - Yojana Rizvi MD  4/1/2021  10:37 AM

## 2021-04-01 NOTE — TELEPHONE ENCOUNTER
Regarding: Possible Labor  ----- Message from [facility] sent at 4/1/2021  6:50 AM EDT -----  "I think my water broke   I had a lot of fluid leak and some bloody discharge "

## 2021-04-01 NOTE — PLAN OF CARE
Problem: PAIN - ADULT  Goal: Verbalizes/displays adequate comfort level or baseline comfort level  Description: Interventions:  - Encourage patient to monitor pain and request assistance  - Assess pain using appropriate pain scale  - Administer analgesics based on type and severity of pain and evaluate response  - Implement non-pharmacological measures as appropriate and evaluate response  - Consider cultural and social influences on pain and pain management  - Notify physician/advanced practitioner if interventions unsuccessful or patient reports new pain  Outcome: Progressing     Problem: INFECTION - ADULT  Goal: Absence or prevention of progression during hospitalization  Description: INTERVENTIONS:  - Assess and monitor for signs and symptoms of infection  - Monitor lab/diagnostic results  - Monitor all insertion sites, i e  indwelling lines, tubes, and drains  - Monitor endotracheal if appropriate and nasal secretions for changes in amount and color  - [place] appropriate cooling/warming therapies per order  - Administer medications as ordered  - Instruct and encourage patient and family to use good hand hygiene technique  - Identify and instruct in appropriate isolation precautions for identified infection/condition  Outcome: Progressing  Goal: Absence of fever/infection during neutropenic period  Description: INTERVENTIONS:  - Monitor WBC    Outcome: Progressing     Problem: SAFETY ADULT  Goal: Patient will remain free of falls  Description: INTERVENTIONS:  - Assess patient frequently for physical needs  -  Identify cognitive and physical deficits and behaviors that affect risk of falls    -  [place] fall precautions as indicated by assessment   - Educate patient/family on patient safety including physical limitations  - Instruct patient to call for assistance with activity based on assessment  - Modify environment to reduce risk of injury  - Consider OT/PT consult to assist with strengthening/mobility  Outcome: Progressing  Goal: Maintain or return to baseline ADL function  Description: INTERVENTIONS:  -  Assess patient's ability to carry out ADLs; assess patient's baseline for ADL function and identify physical deficits which impact ability to perform ADLs (bathing, care of mouth/teeth, toileting, grooming, dressing, etc )  - Assess/evaluate cause of self-care deficits   - Assess range of motion  - Assess patient's mobility; develop plan if impaired  - Assess patient's need for assistive devices and provide as appropriate  - Encourage maximum independence but intervene and supervise when necessary  - Involve family in performance of ADLs  - Assess for home care needs following discharge   - Consider OT consult to assist with ADL evaluation and planning for discharge  - Provide patient education as appropriate  Outcome: Progressing  Goal: Maintain or return mobility status to optimal level  Description: INTERVENTIONS:  - Assess patient's baseline mobility status (ambulation, transfers, stairs, etc )    - Identify cognitive and physical deficits and behaviors that affect mobility  - Identify mobility aids required to assist with transfers and/or ambulation (gait belt, sit-to-stand, lift, walker, cane, etc )  - [place] fall precautions as indicated by assessment  - Record patient progress and toleration of activity level on Mobility SBAR; progress patient to next Phase/Stage  - Instruct patient to call for assistance with activity based on assessment  - Consider rehabilitation consult to assist with strengthening/weightbearing, etc   Outcome: Progressing     Problem: Knowledge Deficit  Goal: Patient/family/caregiver demonstrates understanding of disease process, treatment plan, medications, and discharge instructions  Description: Complete learning assessment and assess knowledge base    Interventions:  - Provide teaching at level of understanding  - Provide teaching via preferred learning methods  Outcome: Progressing     Problem: DISCHARGE PLANNING  Goal: Discharge to home or other facility with appropriate resources  Description: INTERVENTIONS:  - Identify barriers to discharge w/patient and caregiver  - Arrange for needed discharge resources and transportation as appropriate  - Identify discharge learning needs (meds, wound care, etc )  - Arrange for interpretive services to assist at discharge as needed  - Refer to Case Management Department for coordinating discharge planning if the patient needs post-hospital services based on physician/advanced practitioner order or complex needs related to functional status, cognitive ability, or social support system  Outcome: Progressing     Problem: ANTEPARTUM  Goal: Maintain pregnancy as long as maternal and/or fetal condition is stable  Description: INTERVENTIONS:  - Maternal surveillance  - Fetal surveillance  - Monitor uterine activity  - Medications as ordered  - Bedrest  Outcome: Progressing     Problem: BIRTH - VAGINAL/ SECTION  Goal: Fetal and maternal status remain reassuring during the birth process  Description: INTERVENTIONS:  - Monitor vital signs  - Monitor fetal heart rate  - Monitor uterine activity  - Monitor labor progression (vaginal delivery)  - DVT prophylaxis  - Antibiotic prophylaxis  Outcome: Progressing  Goal: Emotionally satisfying birthing experience for mother/fetus  Description: Interventions:  - Assess, plan, implement and evaluate the nursing care given to the patient in labor  - Advocate the philosophy that each childbirth experience is a unique experience and support the family's chosen level of involvement and control during the labor process   - Actively participate in both the patient's and family's teaching of the birth process  - Consider cultural, [place] and age-specific factors and plan care for the patient in labor  Outcome: Progressing

## 2021-04-01 NOTE — TELEPHONE ENCOUNTER
Reason for Disposition   Leakage of fluid from vagina    Answer Assessment - Initial Assessment Questions  1  ONSET: "When did the symptoms begin?"         15 minutes ago  2  CONTRACTIONS: "Are you having any contractions?" If yes, ask: "Describe the contractions that you are having " (e g , duration, frequency, regularity, severity)      A little soreness  3  EMMANUEL: "What date are you expecting to deliver?"      Patient is 34 weeks and 4 days pregnant  4  PARITY: "Have you had a baby before?" If yes, "How long did the labor last?"      First baby  5  FETAL MOVEMENT: "Has the baby's movement decreased or changed significantly from normal?"      Unsure since patient just woke up  6   OTHER SYMPTOMS: "Do you have any other symptoms?" (e g , abdominal pain, vaginal bleeding, fever, hand/facial swelling)      Bloody discharge and leakage of fluid    Protocols used: PREGNANCY - [place] OF [facility]

## 2021-04-01 NOTE — TELEPHONE ENCOUNTER
Notified patient of on call providers recommendation  Patient verbalized understanding and will go to [facility] Delivery  Notified labor and delivery

## 2021-04-01 NOTE — H&P
History & Physical - OB/GYN   Aria Gibson 27 y o  female MRN: [id number]  Unit/Bed#: LD TRIAGE  Encounter: [id number]    Chief complaint:  "Water broke"     HPI:  Contractions:  no  Fetal movement:  yes  Vaginal bleeding:   no  Leaking of fluid:  yes    Aria Gibson is a 27 y o   female at 31w1d by LMP 2020 who presents with "I think my water broke " This morning at around 6:30am, she got up from bed and felt large gush of fluid that soaked her underwear and leaked onto floor  Clear odorless fluid  Afterwards when wiping, she had blood on tissue and some pink-jesse bloody discharge but no other obvious vaginal bleeding  She is unsure if she feels continuous leakage but has had to change her pad a few times  Pinkish discharge on pad  No other symptoms or concerns  She is a patient of [facility]  Problem List:  Patient Active Problem List   Diagnosis    34 weeks gestation of pregnancy     premature rupture of membranes (PPROM) with unknown onset of labor     PMH:  Past Medical History:   Diagnosis Date    Varicella        OB Hx:  OB History    Para Term  AB Living   1 0 0 0 0 0   SAB TAB Ectopic Multiple Live Births   0 0 0 0 0      # Outcome Date GA Lbr Osvaldo/2nd Weight Sex Delivery Anes PTL Lv   1 Current                PSH:  Past Surgical History:   Procedure Laterality Date    WISDOM TOOTH EXTRACTION Bilateral        Social Hx:  Denies history of tobacco, alcohol, or other drug use  Meds:  No current facility-administered medications on file prior to encounter  Current Outpatient Medications on File Prior to Encounter   Medication Sig Dispense Refill    Prenatal Vit-DSS-Fe Cbn-FA (PRENATAL AD PO) Take by mouth         Allergies:   Allergies   Allergen Reactions    Ceclor [Cefaclor] Other (See Comments)     Childhood reaction       Labs:  Blood type: A+  Antibody: negative  Group B strep: negative  HIV: negative   Hepatitis B: negative  RPR: negative  Rubella: immune  Varicella: unknown  1 hour Glucose: 129    Physical Exam:  /82 (BP Location: Right arm)   Pulse (!) 121   Temp 98 1 °F (36 7 °C) (Oral)   Resp 20   Ht 5' 3" (1 6 m)   Wt 90 7 kg (200 lb)   LMP 2020 (Exact Date)   BMI 35 43 kg/m²   Gen: NAD, cooperative, pleasant  Cardio: RRR, S1/S2 normal   Lungs: good effort, CTAB  Abdomen: soft, nontender  Gravid uterus  Extremities: nontender, no edema   Body mass index is 35 43 kg/m²  Estimated Fetal Weight: 1769 grams - 3 lbs 14 oz  (24%) on U/S 3/15    Presentation: vertex  Membranes: ruptured   Placenta: positerior    Cervical Exam  Speculum: Cervical os appears closed on visualization  Evidence of gross pooling with pink bloody discharge  Fetal monitoring:  FHT:  140 bpm/ Moderate 6 - 25 bpm / accelerations present, no decelerations  [place]: occasional contractions noted     [place]LL[place]:   Membrane status   - Positive ferning   - Positive nitrazene   - Gross pooling noted    Imaging: VTX presentation, RACHAEL 11 85    Lab Results   Component Value Date    WBC 8 1 02/10/2021    HGB 11 5 02/10/2021    HCT 34 1 02/10/2021     02/10/2021     No results found for: NA, K, CL, CO2, BUN, CREATININE, GLUCOSE, AST, ALT    Assessment:   27 y o   at 34w4d admitted with PPROM    Plan:     PPROM at 34w4d  Start betamethasone x 2 for fetal maturity  Start penicillin for GBS unknown status in a  fetus  Follow-up GBS swab, GCCT, UA  NICU consult placed, NICU notified  Plan for 48 hours of expectant management to allow for betamethasone administration, followed by induction of labor for  pre-labor rupture membranes unless maternal or fetal circumstance arise that require more emergent delivery (ACOG )    Pregnancy at 34 weeks  CBC, RPR, type and screen  IV access, IVF at 50cc/hr  FEN: clear liquid diet  VTE ppx: SCDs    Discussed with Dr Anna Torres MD  OBGYN, PGY-4  2021  10:04 AM

## 2021-04-02 ENCOUNTER — ANESTHESIA EVENT (INPATIENT)
Dept: ANESTHESIOLOGY | Facility: HOSPITAL | Age: 26
End: 2021-04-02
Payer: COMMERCIAL

## 2021-04-02 ENCOUNTER — ANESTHESIA (INPATIENT)
Dept: ANESTHESIOLOGY | Facility: HOSPITAL | Age: 26
End: 2021-04-02
Payer: COMMERCIAL

## 2021-04-02 LAB
BASE EXCESS BLDCOA CALC-SCNC: -4.1 MMOL/L (ref 3–11)
BASE EXCESS BLDCOV CALC-SCNC: -4.8 MMOL/L (ref 1–9)
C TRACH DNA SPEC QL NAA+PROBE: NEGATIVE
HCO3 BLDCOA-SCNC: 22.9 MMOL/L (ref 17.3–27.3)
HCO3 BLDCOV-SCNC: 21.9 MMOL/L (ref 12.2–28.6)
N GONORRHOEA DNA SPEC QL NAA+PROBE: NEGATIVE
O2 CT VFR BLDCOA CALC: 12.7 ML/DL
OXYHGB MFR BLDCOA: 56.1 %
OXYHGB MFR BLDCOV: 54.4 %
PCO2 BLDCOA: 48.6 MM[HG] (ref 30–60)
PCO2 BLDCOV: 46.2 MM HG (ref 27–43)
PH BLDCOA: 7.29 [PH] (ref 7.23–7.43)
PH BLDCOV: 7.29 [PH] (ref 7.19–7.49)
PO2 BLDCOA: 24.2 MM HG (ref 5–25)
PO2 BLDCOV: 23.9 MM HG (ref 15–45)
SAO2 % BLDCOV: 12.2 ML/DL

## 2021-04-02 PROCEDURE — NC001 PR NO CHARGE: Performed by: OBSTETRICS & GYNECOLOGY

## 2021-04-02 PROCEDURE — 4A1HXCZ MONITORING OF PRODUCTS OF CONCEPTION, CARDIAC RATE, EXTERNAL APPROACH: ICD-10-PCS | Performed by: OBSTETRICS & GYNECOLOGY

## 2021-04-02 PROCEDURE — 0HQ9XZZ REPAIR PERINEUM SKIN, EXTERNAL APPROACH: ICD-10-PCS | Performed by: OBSTETRICS & GYNECOLOGY

## 2021-04-02 PROCEDURE — 59400 OBSTETRICAL CARE: CPT | Performed by: OBSTETRICS & GYNECOLOGY

## 2021-04-02 PROCEDURE — 88307 TISSUE EXAM BY PATHOLOGIST: CPT | Performed by: PATHOLOGY

## 2021-04-02 PROCEDURE — 82805 BLOOD GASES W/O2 SATURATION: CPT | Performed by: OBSTETRICS & GYNECOLOGY

## 2021-04-02 RX ORDER — ACETAMINOPHEN 325 MG/1
650 TABLET ORAL EVERY 4 HOURS PRN
Status: DISCONTINUED | OUTPATIENT
Start: 2021-04-02 | End: 2021-04-04 | Stop reason: HOSPADM

## 2021-04-02 RX ORDER — OXYTOCIN/RINGER'S LACTATE 30/500 ML
1-30 PLASTIC BAG, INJECTION (ML) INTRAVENOUS
Status: DISCONTINUED | OUTPATIENT
Start: 2021-04-02 | End: 2021-04-02

## 2021-04-02 RX ORDER — DIAPER,BRIEF,INFANT-TODD,DISP
1 EACH MISCELLANEOUS 4 TIMES DAILY PRN
Status: DISCONTINUED | OUTPATIENT
Start: 2021-04-02 | End: 2021-04-04 | Stop reason: HOSPADM

## 2021-04-02 RX ORDER — CALCIUM CARBONATE 200(500)MG
1000 TABLET,CHEWABLE ORAL DAILY PRN
Status: DISCONTINUED | OUTPATIENT
Start: 2021-04-02 | End: 2021-04-04 | Stop reason: HOSPADM

## 2021-04-02 RX ORDER — IBUPROFEN 600 MG/1
600 TABLET ORAL EVERY 6 HOURS PRN
Status: DISCONTINUED | OUTPATIENT
Start: 2021-04-02 | End: 2021-04-04 | Stop reason: HOSPADM

## 2021-04-02 RX ORDER — LIDOCAINE HYDROCHLORIDE AND EPINEPHRINE 15; 5 MG/ML; UG/ML
INJECTION, SOLUTION EPIDURAL
Status: COMPLETED | OUTPATIENT
Start: 2021-04-02 | End: 2021-04-02

## 2021-04-02 RX ORDER — DOCUSATE SODIUM 100 MG/1
100 CAPSULE, LIQUID FILLED ORAL 2 TIMES DAILY
Status: DISCONTINUED | OUTPATIENT
Start: 2021-04-02 | End: 2021-04-04 | Stop reason: HOSPADM

## 2021-04-02 RX ORDER — ONDANSETRON 2 MG/ML
4 INJECTION INTRAMUSCULAR; INTRAVENOUS EVERY 8 HOURS PRN
Status: DISCONTINUED | OUTPATIENT
Start: 2021-04-02 | End: 2021-04-04 | Stop reason: HOSPADM

## 2021-04-02 RX ORDER — SIMETHICONE 80 MG
80 TABLET,CHEWABLE ORAL 4 TIMES DAILY PRN
Status: DISCONTINUED | OUTPATIENT
Start: 2021-04-02 | End: 2021-04-04 | Stop reason: HOSPADM

## 2021-04-02 RX ORDER — DIPHENHYDRAMINE HCL 25 MG
25 TABLET ORAL EVERY 6 HOURS PRN
Status: DISCONTINUED | OUTPATIENT
Start: 2021-04-02 | End: 2021-04-04 | Stop reason: HOSPADM

## 2021-04-02 RX ADMIN — BENZOCAINE AND LEVOMENTHOL: 200; 5 SPRAY TOPICAL at 22:35

## 2021-04-02 RX ADMIN — SODIUM CHLORIDE 2.5 MILLION UNITS: 9 INJECTION, SOLUTION INTRAVENOUS at 05:22

## 2021-04-02 RX ADMIN — SODIUM CHLORIDE 2.5 MILLION UNITS: 9 INJECTION, SOLUTION INTRAVENOUS at 18:02

## 2021-04-02 RX ADMIN — SODIUM CHLORIDE, SODIUM LACTATE, POTASSIUM CHLORIDE, AND CALCIUM CHLORIDE 125 ML/HR: .6; .31; .03; .02 INJECTION, SOLUTION INTRAVENOUS at 14:04

## 2021-04-02 RX ADMIN — SODIUM CHLORIDE 2.5 MILLION UNITS: 9 INJECTION, SOLUTION INTRAVENOUS at 01:45

## 2021-04-02 RX ADMIN — SODIUM CHLORIDE, SODIUM LACTATE, POTASSIUM CHLORIDE, AND CALCIUM CHLORIDE 125 ML/HR: .6; .31; .03; .02 INJECTION, SOLUTION INTRAVENOUS at 08:53

## 2021-04-02 RX ADMIN — SODIUM CHLORIDE 2.5 MILLION UNITS: 9 INJECTION, SOLUTION INTRAVENOUS at 10:16

## 2021-04-02 RX ADMIN — BETAMETHASONE SODIUM PHOSPHATE AND BETAMETHASONE ACETATE 12 MG: 3; 3 INJECTION, SUSPENSION INTRA-ARTICULAR; INTRALESIONAL; INTRAMUSCULAR at 10:15

## 2021-04-02 RX ADMIN — MISOPROSTOL 50 MCG: 100 TABLET ORAL at 11:51

## 2021-04-02 RX ADMIN — LIDOCAINE HYDROCHLORIDE AND EPINEPHRINE 5 ML: 15; 5 INJECTION, SOLUTION EPIDURAL at 17:09

## 2021-04-02 RX ADMIN — SODIUM CHLORIDE 2.5 MILLION UNITS: 9 INJECTION, SOLUTION INTRAVENOUS at 14:06

## 2021-04-02 RX ADMIN — ROPIVACAINE HYDROCHLORIDE: 2 INJECTION, SOLUTION EPIDURAL; INFILTRATION at 17:16

## 2021-04-02 RX ADMIN — Medication 2 MILLI-UNITS/MIN: at 17:15

## 2021-04-02 NOTE — OB LABOR/OXYTOCIN SAFETY PROGRESS
Labor Progress Note Talya Huynh 27 y o  female MRN: [id number]    Unit/Bed#:  203-01 Encounter: [id number]       Contraction Frequency (minutes): irregular, irritablity (pt  states contractions are [id number])  Contraction Quality: Mild  Tachysystole: No   Cervical Dilation: 1        Cervical Effacement: 80  Fetal Station: -2  Baseline Rate: 135 bpm  Fetal Heart Rate: 135 BPM  FHR Category: Category I               Vital Signs:   Vitals:    04/02/21 1459   BP:    Pulse:    Resp:    Temp: 99 °F (37 2 °C)   SpO2:            Notes/comments:      Cervix is soft, stretchy, exam as above  Plan to start Pitocin as it has been 4 hrs since last cytotec  Discussed with Dr Dariana Méndez DO 4/2/2021 4:11 PM

## 2021-04-02 NOTE — OB LABOR/OXYTOCIN SAFETY PROGRESS
Labor Progress Note Talya Huynh 27 y o  female MRN: [id number]    Unit/Bed#: -01 Encounter: [id number]       Contraction Frequency (minutes): irritabilty  Contraction Quality: Mild, Moderate  Tachysystole: No   Cervical Dilation: 1        Cervical Effacement: 80  Fetal Station: -2  Baseline Rate: 135 bpm  Fetal Heart Rate: 150 BPM  FHR Category: Category II               Vital Signs:   Vitals:    04/02/21 1111   BP: 128/79   Pulse: (!) 127   Resp: 18   Temp: 98 4 °F (36 9 °C)   SpO2:            Notes/comments:   Patient received oral cytotec at 1151  She had an approximately 5 5 minute prolonged deceleration with xavier into 85bpm  SVE unchanged  FHT back to baseline following maternal repositioning and IV fluids  Will continue to monitor closely    Dr Leroy Delatorre aware         James Vega MD 4/2/2021 12:21 PM

## 2021-04-02 NOTE — OB LABOR/OXYTOCIN SAFETY PROGRESS
Oxytocin Safety Progress Check Note - Nuris Ernandez 27 y o  female MRN: [id number]    Unit/Bed#: -01 Encounter: [id number]       Contraction Frequency (minutes): irregular, irritablity   Contraction Quality: Mild  Tachysystole: No   Cervical Dilation: 1        Cervical Effacement: 80  Fetal Station: -2  Baseline Rate: 135 bpm  Fetal Heart Rate: 135 BPM  FHR Category: Category I               Vital Signs:   Vitals:    04/02/21 1459   BP:    Pulse:    Resp:    Temp: 99 °F (37 2 °C)   SpO2:        Delayed charting secondary to patient care    Notes/comments:    5 minute prolonged deceleration following contraction from [id number] with a xavier at 65 bpm  Improvement following IVF bolus and maternal repositioning to hand and knees  SVE unchanged  FHT has been category I since deceleration  Will continue to monitor  D/w Dr Gerri Sine Versa Mortimer, MD 4/2/2021 3:01 PM

## 2021-04-02 NOTE — OB LABOR/OXYTOCIN SAFETY PROGRESS
Oxytocin Safety Progress Check Note - Tomasz Mac 27 y o  female MRN: [id number]    Unit/Bed#: -01 Encounter: [id number]    Dose (josiah-units/min) Oxytocin: 4 josiah-units/min  Contraction Frequency (minutes): 1-4  Contraction Quality: Moderate  Tachysystole: No   Cervical Dilation: 10  Dilation Complete Date: 04/02/21  Dilation Complete Time: 1913  Cervical Effacement: 100  Fetal Station: 2  Baseline Rate: 140 bpm  Fetal Heart Rate: 135 BPM  FHR Category: Category II               Vital Signs:   Vitals:    04/02/21 1846   BP: 110/63   Pulse: 96   Resp:    Temp:    SpO2:            Notes/comments:      Martine  is feeling pressure, SVE revealed that she is completely dilated and +2 station    Dr Maximo Toledo notified and on her way end    Kinza Conroy DO 4/2/2021 7:13 PM

## 2021-04-02 NOTE — UTILIZATION REVIEW
Initial Clinical Review    Admission: Date/Time/Statement:   Admission Orders (From admission, onward)     Ordered        21 1002  Inpatient Admission  Once                   Orders Placed This Encounter   Procedures    Inpatient Admission     Standing Status:   Standing     Number of Occurrences:   1     Order Specific Question:   Level of Care     Answer:   Med Surg [16]     Order Specific Question:   Estimated length of stay     Answer:   More than 2 Midnights     Order Specific Question:   Certification     Answer:   I certify that inpatient services are medically necessary for this patient for a duration of greater than two midnights  See H&P and MD Progress Notes for additional information about the patient's course of treatment  ED Arrival Information     Patient not seen in ED                     Chief Complaint   Patient presents with    Rupture of Membranes     0630 clear     Assessment/Plan: 27 y o   at 34w4d admitted Inpatient  with PPROM  This morning at around 6:30am, she got up from bed and felt large gush of fluid that soaked her underwear and leaked onto floor  Clear odorless fluid  Afterwards when wiping, she had blood on tissue and some pink-jesse bloody discharge but no other obvious vaginal bleeding  She is unsure if she feels continuous leakage but has had to change her pad a few times  In triage: Cervical os appears closed on visualization  Evidence of gross pooling with pink bloody discharge  Positive ferning, positive Nitrazine, gross pooling  [place]: occasional contractions noted  Consult MFM, NICU; start BTM, IV penecillin, plan for 48 HR Beta Methasone admin followed by IOL for PPROM  IVF, clear liq diet   MFM  Presents Fetus is vertex presentation with amniotic fluid of 9 2 and estimated fetal weight at the 35th percentile  Nonstress test is reactive and reassuring without decelerations  Discussed ACOG guidelines and contemporary management    At the conclusion , we elected for induction of labor to start after 24 hours of steroids with IOL on 2021, recommend latency antibx, nicu consult  2021 OB Resident  Assessment:  27 y o   at 34w5d admitted with PPROM  Hospital day 1  Plan:  PPROM @ 34w  - BTM -  - Patient with contractions this AM  - SVE  /-2, plan to move to labor room this AM       Triage Vitals   Temperature Pulse Respirations Blood Pressure SpO2   21 0820 21 0820 21 0820 21 0820 21 1657   98 1 °F (36 7 °C) (!) 121 20 137/82 95 %      Temp Source Heart Rate Source Patient Position - Orthostatic VS BP Location FiO2 (%)   21 0820 21 0820 21 1657 21 0820 --   Oral Monitor Lying Right arm       Pain Score       21 08       No Pain          Wt Readings from Last 1 Encounters:   21 90 7 kg (200 lb)     Additional Vital Signs:   Date/Time  Temp  Pulse  Resp  BP  SpO2  O2 Device  Cardiac (WDL)  Patient Position - Orthostatic VS   21 0826  97 9 °F (36 6 °C)  94  20  116/66  97 %  --  --  --   21 0800  --  --  --  --  --  --  WDL  --   Comment rows:   OBSERV: plan of care for IOL today reviewed with pt at 21 0800   21 0635  98 5 °F (36 9 °C)  102  16  113/67  --  --  --  --   21 0503  --  --  --  --  --  --  --  --   Comment rows:   OBSERV: RN in room to [employer]  Pt reports at this time taht for approximately one hour, she has had incraesed back pain, and a cramping/ tight feeling in her abdomen  Notified Dr Ann Shaver via tiger text at 21 0503   21 0311  --  --  --  --  --  --  --  --   Comment rows:   OBSERV: In room during deceleration of fetal heart rate  Pt states at [phone], she felt a stronger back pain than she has been feeling all day  This was at the start of a fetal heart rate deceleration   Pt repositioned and Dr Ann Shaver notified at 1   21 2359  97 7 °F (36 5 °C)  100  16  113/56  95 %  None (Room air)  --  Lying 04/01/21 2041  --  --  --  --  --  None (Room air)  --  --   Comment rows:   OBSERV: pt reports positive fetal movement and minimal pink/ clear fluid leakage  Pt denies ctx, though states that she has had intermittent back pain rated about 2/10 all day  Pt understands to report if pain increases or becomes more frequent   at 04/01/21 2041 04/01/21 2002  98 1 °F (36 7 °C)  110Abnormal   20  135/82  95 %  None (Room air)  --  Lying   04/01/21 1657  98 3 °F (36 8 °C)  98  20  135/73  95 %  None (Room air)  --  Lying   04/01/21 1500  --  --  --  --  --  --  WDL  --   Comment rows:   OBSERV: pt recieved and oriented to room 328 at 04/01/21 1500   04/01/21 0820  98 1 °F (36 7 °C)  121Abnormal   20  137/82  --  --  --  --      Weights (last 14 days)    Date/Time  Weight  Height   04/01/21 0820  90 7 kg (200 lb)  5' 3" (1 6 m)     Pertinent Labs/Diagnostic Test Results:       Results from last 7 days   Lab Units 04/01/21  1031   WBC Thousand/uL 9 25   HEMOGLOBIN g/dL 13 5   HEMATOCRIT % 39 7   PLATELETS Thousands/uL 196   NEUTROS ABS Thousands/µL 7 94*       Results from last 7 days   Lab Units 04/01/21  1031   CLARITY UA  Slightly Cloudy   COLOR UA  Yellow   SPEC GRAV UA  1 015   PH UA  6 0   GLUCOSE UA mg/dl Negative   KETONES UA mg/dl Negative   BLOOD UA  Small*   PROTEIN UA mg/dl Negative   NITRITE UA  Positive*   BILIRUBIN UA  Negative   UROBILINOGEN UA E U /dl 0 2   LEUKOCYTES UA  Negative   WBC UA /hpf 0-1   RBC UA /hpf 20-30*   BACTERIA UA /hpf Occasional   EPITHELIAL CELLS WET PREP /hpf Moderate*             Results from last 7 days   Lab Units 04/01/21  1031   AMPH/METH  Negative   BARBITURATE UR  Negative   BENZODIAZEPINE UR  Negative   COCAINE UR  Negative   METHADONE URINE  Negative   OPIATE UR  Negative   PCP UR  Negative   THC UR  Negative           ED Treatment:   Medication Administration - No Administrations Displayed (No Start Event Found)     None        Past Medical History:   Diagnosis Date    Varicella        Admitting Diagnosis: 34 weeks gestation of pregnancy [Z3A 34]  Age/Sex: 27 y o  female  Admission Orders:  BTM  continuous external uterine contraction & fetal HR monitoring  scd    Scheduled Medications:  azithromycin, 1,000 mg, Oral, Q24H  penicillin G, 2 5 Million Units, Intravenous, Q4H    Continuous IV Infusions:  lactated ringers, 50 mL/hr, Intravenous, Continuous    PRN Meds:     IP CONSULT TO ANESTHESIOLOGY  IP CONSULT TO PERINATOLOGY  IP CONSULT TO NEONATOLOGY  Network Utilization Review Department  ATTENTION: Please call with any questions or concerns to [phone] and carefully listen to the prompts so that you are directed to the right person  All voicemails are confidential   Bridget Sanders all requests for admission clinical reviews, approved or denied determinations and any other requests to dedicated fax number below belonging to the campus where the patient is receiving treatment   List of dedicated fax numbers for the Facilities:  [address] DENIALS (Administrative/Medical Necessity) [phone]   [address] (Maternity/NICU/Pediatrics) [phone]   [address] [place] [address] Dr Shea Young 7499 (Perez Sargent "Ashley" 103) [address] [place] [address] [address] [place] [address] [phone]

## 2021-04-02 NOTE — ANESTHESIA PREPROCEDURE EVALUATION
Procedure:  LABOR ANALGESIA    Relevant Problems   GYN   (+) 34 weeks gestation of pregnancy        Physical Exam    Airway    Mallampati score: II  TM Distance: >3 FB  Neck ROM: full     Dental   No notable dental hx     Cardiovascular      Pulmonary      Other Findings        Anesthesia Plan  ASA Score- 2     Anesthesia Type- epidural with ASA Monitors  Additional Monitors:   Airway Plan:     Comment: Patient examined, history reviewed  Labor epidural explained, risks and benefits discussed  Consent has been signed          Plan Factors-Exercise tolerance (METS): >4 METS  Chart reviewed  Existing labs reviewed  Patient summary reviewed  Induction-     Postoperative Plan-     Informed Consent- Anesthetic plan and risks discussed with patient  I personally reviewed this patient with the CRNA  Discussed and agreed on the Anesthesia Plan with the CRNA  Angeles Barillas

## 2021-04-02 NOTE — PROGRESS NOTES
Progress Note - Maternal Fetal Medicine   Janene Cam 27 y o  female MRN: [id number]  Unit/Bed#: -01 Encounter: [id number]    Assessment:  27 y o  Anne-Marie Elizabeth at 34w5d admitted with PPROM  Hospital day 1    Plan:    PPROM @ 34w  - BTM 4/1-4/2  - Patient with contractions this AM  - SVE  1/80/-2, plan to move to labor room this AM      Subjective/Objective     Subjective:     Contractions: yes  Loss of fluid: no  Vaginal bleeding: no  Fetal movement: yes    Pain: no  Tolerating PO: yes  Voiding: yes  Ambulating: yes  Headaches: no  Visual changes: no  Chest pain: no  Shortness of breath: no  Nausea: no  Vomiting/Diarrhea: no  Dysuria: no  Leg pain: no    Objective:     Vitals:   Temp:  [97 7 °F (36 5 °C)-98 3 °F (36 8 °C)] 97 7 °F (36 5 °C)  HR:  [] 100  Resp:  [16-20] 16  BP: (113-137)/(56-82) 113/56       Physical Exam  HENT:      Head: Normocephalic and atraumatic  Cardiovascular:      Rate and Rhythm: Normal rate and regular rhythm  Heart sounds: Normal heart sounds  Pulmonary:      Effort: No respiratory distress  Breath sounds: Normal breath sounds  No wheezing  Abdominal:      General: Bowel sounds are normal       Tenderness: There is no abdominal tenderness        SVE: 1/80/-2        Fetal Assessment:  FHT: 125bpm / Moderate 6 - 25 bpm / 15x15 accelerations, occasional late and variable decelerations improved with maternal repositioning  Elsa: q5 mins    Lab Results   Component Value Date    WBC 9 25 04/01/2021    HGB 13 5 04/01/2021    HCT 39 7 04/01/2021    MCV 97 04/01/2021     04/01/2021       No results found for: GLUCOSE, CALCIUM, NA, K, CO2, CL, BUN, CREATININE    No results found for: ALT, AST, GGT, ALKPHOS, BILITOT    Jen Mata MD  OBGYN, PGY-3  4/2/2021  6:49 AM

## 2021-04-02 NOTE — ANESTHESIA PROCEDURE NOTES
Epidural Block    Start time: 4/2/2021 4:54 PM  Reason for block: procedure for pain and at surgeon's request  Staffing  Anesthesiologist: Frantz Wolf MD  Performed: anesthesiologist   Preanesthetic Checklist  Completed: patient identified, site marked, surgical consent, pre-op evaluation, timeout performed, IV checked, risks and benefits discussed and monitors and equipment checked  Epidural  Patient position: sitting  Prep: ChloraPrep  Patient monitoring: cardiac monitor, frequent blood pressure checks, continuous pulse ox and heart rate  Approach: midline  Location: lumbar (1-5)  Injection technique: GLENNY air  Needle  Needle type: Tuohy   Needle gauge: 18 G  Catheter type: side hole  Catheter size: 20 G  Catheter at skin depth: 11 cm  Test dose: negativelidocaine 1 5% with epinephrine 1:200,000 test dose, 5 mLnegative aspiration for CSF, negative aspiration for heme and no paresthesia on injection  patient tolerated the procedure well with no immediate complications

## 2021-04-02 NOTE — PLAN OF CARE
Problem: PAIN - ADULT  Goal: Verbalizes/displays adequate comfort level or baseline comfort level  Description: Interventions:  - Encourage patient to monitor pain and request assistance  - Assess pain using appropriate pain scale  - Administer analgesics based on type and severity of pain and evaluate response  - Implement non-pharmacological measures as appropriate and evaluate response  - Consider cultural and social influences on pain and pain management  - Notify physician/advanced practitioner if interventions unsuccessful or patient reports new pain  Outcome: Progressing     Problem: INFECTION - ADULT  Goal: Absence or prevention of progression during hospitalization  Description: INTERVENTIONS:  - Assess and monitor for signs and symptoms of infection  - Monitor lab/diagnostic results  - Monitor all insertion sites, i e  indwelling lines, tubes, and drains  - Monitor endotracheal if appropriate and nasal secretions for changes in amount and color  - [place] appropriate cooling/warming therapies per order  - Administer medications as ordered  - Instruct and encourage patient and family to use good hand hygiene technique  - Identify and instruct in appropriate isolation precautions for identified infection/condition  Outcome: Progressing  Goal: Absence of fever/infection during neutropenic period  Description: INTERVENTIONS:  - Monitor WBC    Outcome: Progressing     Problem: SAFETY ADULT  Goal: Patient will remain free of falls  Description: INTERVENTIONS:  - Assess patient frequently for physical needs  -  Identify cognitive and physical deficits and behaviors that affect risk of falls    -  [place] fall precautions as indicated by assessment   - Educate patient/family on patient safety including physical limitations  - Instruct patient to call for assistance with activity based on assessment  - Modify environment to reduce risk of injury  - Consider OT/PT consult to assist with strengthening/mobility  Outcome: Progressing  Goal: Maintain or return to baseline ADL function  Description: INTERVENTIONS:  -  Assess patient's ability to carry out ADLs; assess patient's baseline for ADL function and identify physical deficits which impact ability to perform ADLs (bathing, care of mouth/teeth, toileting, grooming, dressing, etc )  - Assess/evaluate cause of self-care deficits   - Assess range of motion  - Assess patient's mobility; develop plan if impaired  - Assess patient's need for assistive devices and provide as appropriate  - Encourage maximum independence but intervene and supervise when necessary  - Involve family in performance of ADLs  - Assess for home care needs following discharge   - Consider OT consult to assist with ADL evaluation and planning for discharge  - Provide patient education as appropriate  Outcome: Progressing  Goal: Maintain or return mobility status to optimal level  Description: INTERVENTIONS:  - Assess patient's baseline mobility status (ambulation, transfers, stairs, etc )    - Identify cognitive and physical deficits and behaviors that affect mobility  - Identify mobility aids required to assist with transfers and/or ambulation (gait belt, sit-to-stand, lift, walker, cane, etc )  - [place] fall precautions as indicated by assessment  - Record patient progress and toleration of activity level on Mobility SBAR; progress patient to next Phase/Stage  - Instruct patient to call for assistance with activity based on assessment  - Consider rehabilitation consult to assist with strengthening/weightbearing, etc   Outcome: Progressing     Problem: Knowledge Deficit  Goal: Patient/family/caregiver demonstrates understanding of disease process, treatment plan, medications, and discharge instructions  Description: Complete learning assessment and assess knowledge base    Interventions:  - Provide teaching at level of understanding  - Provide teaching via preferred learning methods  Outcome: Progressing     Problem: DISCHARGE PLANNING  Goal: Discharge to home or other facility with appropriate resources  Description: INTERVENTIONS:  - Identify barriers to discharge w/patient and caregiver  - Arrange for needed discharge resources and transportation as appropriate  - Identify discharge learning needs (meds, wound care, etc )  - Arrange for interpretive services to assist at discharge as needed  - Refer to Case Management Department for coordinating discharge planning if the patient needs post-hospital services based on physician/advanced practitioner order or complex needs related to functional status, cognitive ability, or social support system  Outcome: Progressing     Problem: ANTEPARTUM  Goal: Maintain pregnancy as long as maternal and/or fetal condition is stable  Description: INTERVENTIONS:  - Maternal surveillance  - Fetal surveillance  - Monitor uterine activity  - Medications as ordered  - Bedrest  Outcome: Progressing     Problem: BIRTH - VAGINAL/ SECTION  Goal: Fetal and maternal status remain reassuring during the birth process  Description: INTERVENTIONS:  - Monitor vital signs  - Monitor fetal heart rate  - Monitor uterine activity  - Monitor labor progression (vaginal delivery)  - DVT prophylaxis  - Antibiotic prophylaxis  Outcome: Progressing  Goal: Emotionally satisfying birthing experience for mother/fetus  Description: Interventions:  - Assess, plan, implement and evaluate the nursing care given to the patient in labor  - Advocate the philosophy that each childbirth experience is a unique experience and support the family's chosen level of involvement and control during the labor process   - Actively participate in both the patient's and family's teaching of the birth process  - Consider cultural, Quaker and age-specific factors and plan care for the patient in labor  Outcome: Progressing

## 2021-04-02 NOTE — PLAN OF CARE
Problem: PAIN - ADULT  Goal: Verbalizes/displays adequate comfort level or baseline comfort level  Description: Interventions:  - Encourage patient to monitor pain and request assistance  - Assess pain using appropriate pain scale  - Administer analgesics based on type and severity of pain and evaluate response  - Implement non-pharmacological measures as appropriate and evaluate response  - Consider cultural and social influences on pain and pain management  - Notify physician/advanced practitioner if interventions unsuccessful or patient reports new pain  Outcome: Progressing     Problem: INFECTION - ADULT  Goal: Absence or prevention of progression during hospitalization  Description: INTERVENTIONS:  - Assess and monitor for signs and symptoms of infection  - Monitor lab/diagnostic results  - Monitor all insertion sites, i e  indwelling lines, tubes, and drains  - Monitor endotracheal if appropriate and nasal secretions for changes in amount and color  - [place] appropriate cooling/warming therapies per order  - Administer medications as ordered  - Instruct and encourage patient and family to use good hand hygiene technique  - Identify and instruct in appropriate isolation precautions for identified infection/condition  Outcome: Progressing  Goal: Absence of fever/infection during neutropenic period  Description: INTERVENTIONS:  - Monitor WBC    Outcome: Progressing     Problem: SAFETY ADULT  Goal: Patient will remain free of falls  Description: INTERVENTIONS:  - Assess patient frequently for physical needs  -  Identify cognitive and physical deficits and behaviors that affect risk of falls    -  [place] fall precautions as indicated by assessment   - Educate patient/family on patient safety including physical limitations  - Instruct patient to call for assistance with activity based on assessment  - Modify environment to reduce risk of injury  - Consider OT/PT consult to assist with strengthening/mobility  Outcome: Progressing  Goal: Maintain or return to baseline ADL function  Description: INTERVENTIONS:  -  Assess patient's ability to carry out ADLs; assess patient's baseline for ADL function and identify physical deficits which impact ability to perform ADLs (bathing, care of mouth/teeth, toileting, grooming, dressing, etc )  - Assess/evaluate cause of self-care deficits   - Assess range of motion  - Assess patient's mobility; develop plan if impaired  - Assess patient's need for assistive devices and provide as appropriate  - Encourage maximum independence but intervene and supervise when necessary  - Involve family in performance of ADLs  - Assess for home care needs following discharge   - Consider OT consult to assist with ADL evaluation and planning for discharge  - Provide patient education as appropriate  Outcome: Progressing  Goal: Maintain or return mobility status to optimal level  Description: INTERVENTIONS:  - Assess patient's baseline mobility status (ambulation, transfers, stairs, etc )    - Identify cognitive and physical deficits and behaviors that affect mobility  - Identify mobility aids required to assist with transfers and/or ambulation (gait belt, sit-to-stand, lift, walker, cane, etc )  - [place] fall precautions as indicated by assessment  - Record patient progress and toleration of activity level on Mobility SBAR; progress patient to next Phase/Stage  - Instruct patient to call for assistance with activity based on assessment  - Consider rehabilitation consult to assist with strengthening/weightbearing, etc   Outcome: Progressing     Problem: Knowledge Deficit  Goal: Patient/family/caregiver demonstrates understanding of disease process, treatment plan, medications, and discharge instructions  Description: Complete learning assessment and assess knowledge base    Interventions:  - Provide teaching at level of understanding  - Provide teaching via preferred learning methods  Outcome: Progressing     Problem: DISCHARGE PLANNING  Goal: Discharge to home or other facility with appropriate resources  Description: INTERVENTIONS:  - Identify barriers to discharge w/patient and caregiver  - Arrange for needed discharge resources and transportation as appropriate  - Identify discharge learning needs (meds, wound care, etc )  - Arrange for interpretive services to assist at discharge as needed  - Refer to Case Management Department for coordinating discharge planning if the patient needs post-hospital services based on physician/advanced practitioner order or complex needs related to functional status, cognitive ability, or social support system  Outcome: Progressing     Problem: ANTEPARTUM  Goal: Maintain pregnancy as long as maternal and/or fetal condition is stable  Description: INTERVENTIONS:  - Maternal surveillance  - Fetal surveillance  - Monitor uterine activity  - Medications as ordered  - Bedrest  Outcome: Progressing     Problem: BIRTH - VAGINAL/ SECTION  Goal: Fetal and maternal status remain reassuring during the birth process  Description: INTERVENTIONS:  - Monitor vital signs  - Monitor fetal heart rate  - Monitor uterine activity  - Monitor labor progression (vaginal delivery)  - DVT prophylaxis  - Antibiotic prophylaxis  Outcome: Progressing  Goal: Emotionally satisfying birthing experience for mother/fetus  Description: Interventions:  - Assess, plan, implement and evaluate the nursing care given to the patient in labor  - Advocate the philosophy that each childbirth experience is a unique experience and support the family's chosen level of involvement and control during the labor process   - Actively participate in both the patient's and family's teaching of the birth process  - Consider cultural, Muslim and age-specific factors and plan care for the patient in labor  Outcome: Progressing

## 2021-04-02 NOTE — OB LABOR/OXYTOCIN SAFETY PROGRESS
Labor Progress Note Jaspreet Saba 27 y o  female MRN: [id number]    Unit/Bed#:  203-01 Encounter: [id number]       Contraction Frequency (minutes): irritabilty  Contraction Quality: Mild, Moderate  Tachysystole: No   Cervical Dilation: 1        Cervical Effacement: 80  Fetal Station: -2  Baseline Rate: 135 bpm  Fetal Heart Rate: 150 BPM  FHR Category: Category II               Vital Signs:   Vitals:    04/02/21 1111   BP: 128/79   Pulse: (!) 127   Resp: 18   Temp: 98 4 °F (36 9 °C)   SpO2:            Notes/comments:      Induction and labor consents signed  Oral Cytotec to be given now  [place] presentation confirmed on ultrasound    Meenakshi Rangel MD 4/2/2021 11:51 AM

## 2021-04-03 LAB — GP B STREP DNA SPEC QL NAA+PROBE: NEGATIVE

## 2021-04-03 PROCEDURE — 99024 POSTOP FOLLOW-UP VISIT: CPT | Performed by: OBSTETRICS & GYNECOLOGY

## 2021-04-03 RX ADMIN — IBUPROFEN 600 MG: 600 TABLET, FILM COATED ORAL at 07:53

## 2021-04-03 RX ADMIN — IBUPROFEN 600 MG: 600 TABLET, FILM COATED ORAL at 16:04

## 2021-04-03 RX ADMIN — ACETAMINOPHEN 650 MG: 325 TABLET, FILM COATED ORAL at 16:04

## 2021-04-03 RX ADMIN — DOCUSATE SODIUM 100 MG: 100 CAPSULE, LIQUID FILLED ORAL at 16:05

## 2021-04-03 RX ADMIN — DOCUSATE SODIUM 100 MG: 100 CAPSULE, LIQUID FILLED ORAL at 07:54

## 2021-04-03 NOTE — DISCHARGE SUMMARY
Discharge Summary - Janene Cam 27 y o  female MRN: [id number]    Unit/Bed#: -01 Encounter: [id number]    Admission Date: 2021     Discharge Date: 21      Admitting Diagnosis:   Patient Active Problem List   Diagnosis    34 weeks gestation of pregnancy     premature rupture of membranes (PPROM) with unknown onset of labor     (spontaneous vaginal delivery)       Discharge Diagnosis:   Same, delivered    Procedures:   spontaneous vaginal delivery    Admitting Attending: Dr Neetu Ivy MD  Delivery Attending: Dr Neetu Ivy MD  Discharge Attending: Dr Nicolette Lomax:     Janene Cam is a 27 y o  Rufino Downing 34w5d   She presented to labor and delivery for premature pre-labor rupture of membranes  She received a full course of betamethasone  She was started on 1 g of azithromycin and pelvis cell and for latency antibiotics  A gonorrhea chlamydia and GBS culture was collected  She received 1 dose of Cytotec and started having late decelerations  After her strip return to category 1, she was started on Pitocin  She quickly made change to be complete and started pushing      She delivered a viable female  on 21 at 1939  Weight 4lbs 12oz via spontaneous vaginal delivery  Apgars were 9 (1 min) and 9 (5 min)   was transferred to  nursery  Patient tolerated the procedure well and was transferred to recovery in stable condition  Her post-partum course was uncomplicated  Her post-partum pain was well controlled with oral analgesics  The patient's post partum course was unremarkable  On day of discharge, she was ambulating and able to reasonably perform all ADLs  She was voiding and had appropriate bowel function  Pain was well controlled  She was discharged home on postpartum day #2 without complications   Patient was instructed to follow up with her OB as an outpatient and was given appropriate warnings to call doctor or provider if she develops signs of infection or uncontrolled pain  On day of discharge she was ambulating, voiding spontaneously, tolerating oral intake and hemodynamically stable  Mom's blood type is A positive and  Rhogam was not given  Condition at discharge:   good     Disposition:   See After Visit Summary for discharge disposition information  Planned Readmission:   No    Discharge Medications:   Prenatal vitamin daily for 6 months or the duration of nursing whichever is longer  Motrin 600 mg orally every 6 hours as needed for pain  Tylenol (over the counter) per bottle directions as needed for pain  Hydrocortisone cream 1% (over the counter) applied 1-2x daily to hemorrhoids as needed  Witch hazel pads for hemorrhoidal discomfort as needed    Discharge instructions :   -Do not place anything (no partner, tampons or douche) in your vagina for 6 weeks  -You may walk for exercise for the first 6 weeks then gradually return to your usual activities    -Please do not drive for 1 week if you have no stitches and for 2 weeks if you have stitches or underwent a  delivery     -You may take baths or shower per your preference    -Please look at your bust (breasts) in the mirror daily and call provider for redness or tenderness or increased warmth  - If you have had a  section please look at your incision daily as well and call provider for increasing redness or steady drainage from the incision    -Please call your provider if temperature > 100 4*F or 38* C, worsening pain or a foul discharge  Francisca Leija 89, DO  Obstetrics & Gynecology PGY-1  2021  8:01 AM

## 2021-04-03 NOTE — DISCHARGE INSTRUCTIONS
Self Care After Delivery   AMBULATORY CARE:   The postpartum period is the period of time from delivery to about 6 weeks  During this time you may experience many physical and emotional changes  It is important to understand what is normal and when you need to call your healthcare provider  It is also important to know how to care for yourself during this time  Call your local emergency number (911 in the [address]) for any of the following:   · You see or hear things that are not there, or have thoughts of harming yourself or your baby  · You soak through 1 pad in 15 minutes, have blurry vision, clammy or pale skin, and feel faint  · You faint or lose consciousness  · You have trouble breathing  · You cough up blood  · Your  incision comes apart  Seek care immediately if:   · Your heart is beating faster than usual      · You have a bad headache or changes in your vision  · Your perineal tear, episiotomy site, or  incision is red, swollen, bleeding, or draining pus  · You have severe abdominal pain  Call your doctor or obstetrician if:   · Your leg is painful, red, and larger than usual      · You soak through 1 or more pads in an hour, or pass blood clots larger than a quarter from your vagina  · You have a fever  · You have new or worsening pain in your abdomen or vagina  · You continue to have depression 1 to 2 weeks after you deliver  · You have trouble sleeping  · You have foul-smelling discharge from your vagina  · You have pain or burning when you urinate  · You do not have a bowel movement for 3 days or more  · You have nausea or are vomiting  · You have hard lumps or red streaks over your breasts  · You have cracked nipples or bleed from your nipples  · You have questions or concerns about your condition or care  Physical changes:  The following are normal changes after you give birth:  · Pain in the area between your anus and vagina     · Breast pain     · Constipation or hemorrhoids     · Hot or cold flashes     · Vaginal bleeding or discharge     · Mild to moderate abdominal cramping     · Difficulty controlling bowel movements or urine     Emotional changes: A drop in hormone levels after you deliver may cause changes in your emotions  You may feel irritable, sad, or anxious  You may cry easily or for no reason  You may also feel depressed  Depression that continues can be a sign of postpartum depression, a condition that can be treated  Treatment may include talk therapy, medicines, or both  Healthcare providers will ask how you are feeling and if you have any depression  These talks can happen during appointments for your medical care and for your baby's care, such as well child visits  Providers can help you find ways to care for yourself and your baby  Talk to your providers about the following:  · When emotional changes or depression started, and if it is getting worse over time     · Problems you are having with daily activities, sleep, or caring for your baby     · If anything makes you feel worse, or makes you feel better     · Feeling that you are not bonding with your baby the way you want     · Any problems your baby has with sleeping or feeding     · Your baby is fussy or cries a lot     · Support you have from friends, family, or others     Breast care for breastfeeding mothers: You may have sore breasts for 3 to 6 days after you give birth  This happens as your milk begins to fill your breasts  You may also have sore breasts if you do not breastfeed frequently  Do the following to care for your breasts:  · Apply a moist, warm, compress to your breast as directed  This may help soothe your breasts  Make sure the washcloth is not too hot before you apply it to your breast      · Nurse your baby or pump your milk frequently  This may prevent clogged milk ducts   Ask your healthcare provider how often to nurse or pump  · Massage your breasts as directed  This may help increase your milk flow  Gently rub your breasts in a circular motion before you breastfeed  You may need to gently squeeze your breast or nipple to help release milk  You can also use a breast pump to help release milk from your breast      · Wash your breasts with warm water only  Do not put soap on your nipples  Soap may cause your nipples to become dry  · Apply lanolin cream to your nipples as directed  Lanolin cream may add moisture to your skin and prevent nipple dryness  Always wash off lanolin cream with warm water before you breastfeed  · Place pads in your bra  Your nipples may leak milk when you are not breastfeeding  You can place pads inside of your bra to help prevent leaking onto your clothing  Ask your healthcare provider where to purchase bra pads  · Get breastfeeding support if needed  Healthcare providers can answer questions about breastfeeding and provide you with support  Ask your healthcare provider who you can contact if you need breastfeeding support  Breast care for non-breastfeeding mothers: Milk will fill your breasts even if you bottle feed your baby  Do the following to help stop your milk from filling your breasts and causing pain:  · Wear a bra with support at all times  A sports bra or a tight-fitting bra will help stop your milk from coming in  · Apply ice on each breast for 15 to 20 minutes every hour or as directed  Use an ice pack, or put crushed ice in a plastic bag  Cover it with a towel before you apply it to your breast  Ice helps your milk ducts shrink  · Keep your breasts away from warm water  Warm water will make it easier for milk to fill your breasts  Stand with your breasts away from warm water in the shower  · Limit how much you touch your breasts  This will prevent them from filling with milk  Perineum care: Your perineum is the area between your rectum and vagina   It is normal to have swelling and pain in this area after you give birth  If you had an episiotomy, your healthcare provider may give you special instructions  · Clean your perineum after you use the bathroom  This may prevent infection and help with healing  Use a spray bottle with warm water to clean your perineum  You may also gently spray warm water against your perineum when you urinate  Always wipe front to back  · Take a sitz bath as directed  A sitz bath may help relieve swelling and pain  Fill your bath tub or bucket with water up to your hips and sit in the water  Use cold water for 2 days after you deliver  Then use warm water  Ask your healthcare provider for more information about a sitz bath  · Apply ice packs for the first 24 hours or as directed  Use a plastic glove filled with ice or buy an ice pack  Wrap the ice pack or plastic glove in a small towel or wash cloth  Place the ice pack on your perineum for 20 minutes at a time  · Sit on a donut-shaped pillow  This may relieve pressure on your perineum when you sit  · Use wipes that contain medicine or take pills as directed  Your healthcare provider may tell you to use witch hazel pads  You can place witch hazel pads in the refrigerator before you apply them to your perineum  Your provider may also tell you to take NSAIDs  Ask him or her how often to take pills or use the wipes  · Do not go swimming or take tub baths for 4 to 6 weeks or as directed  This will help prevent an infection in your vagina or uterus  Bowel and bladder care: It may take 3 to 5 days to have a bowel movement after you deliver your baby  You can do the following to prevent or manage constipation, and get control of your bowel or bladder:  · Take stool softeners as directed  A stool softener is medicine that will make your bowel movements softer  This may prevent or relieve constipation  A stool softener may also make bowel movements less painful       · Drink plenty of liquids  Ask how much liquid to drink each day and which liquids are best for you  Liquids may help prevent constipation  · Eat foods high in fiber  Examples include fruits, vegetables, grains, beans, and lentils  Ask your healthcare provider how much fiber you need each day  Fiber may prevent constipation  · Do Kegel exercises as directed  Kegel exercises will help strengthen the muscles that control bowel movements and urination  Ask your healthcare provider for more information on Kegel exercises  · Apply cold compresses or medicine to hemorrhoids as directed  This may relieve swelling and pain  Your healthcare provider may tell you to apply ice or wipes that contain medicine to your hemorrhoids  He or she may also tell you to use a sitz bath  Ask your provider for more information on how to manage hemorrhoids  Nutrition: Good nutrition is important in the postpartum period  It will help you return to a healthy weight, increase your energy levels, and prevent constipation  It will also help you get enough nutrients and calories if you are going to breastfeed your baby  · Eat a variety of healthy foods  Healthy foods include fruits, vegetables, whole-grain breads, low-fat dairy products, beans, lean meats, and fish  You may need 500 to 700 extra calories each day if you breastfeed your baby  You may also need extra protein  · Limit foods with added sugar and high amounts of fat  These foods are high in calories and low in healthy nutrients  Read food labels so you know how much sugar and fat is in the food you want to eat  · Drink 8 to 10 glasses of water per day  Water will help you make plenty of milk for your baby  It will also help prevent constipation  Drink a glass of water every time you breastfeed your baby  · Take vitamins as directed  Ask your healthcare provider what vitamins you need  · Limit caffeine and alcohol if you are breastfeeding   Caffeine and alcohol can get into your breast milk  Caffeine and alcohol can make your baby fussy  They can also interfere with your baby's sleep  Ask your healthcare provider if you can drink alcohol or caffeine  Rest and sleep: You may feel very tired in the postpartum period  Enough sleep will help you heal and give you energy to care for your baby  The following may help you get sleep and rest:  · Nap when your baby naps  Your baby may nap several times during the day  Get rest during this time  · Limit visitors  Many people may want to see you and your baby  Ask friends or family to visit on different days  This will give you time to rest      · Do not plan too much for one day  Put off household chores so that you have time to rest  Gradually do more each day  · Ask for help from family, friends, or neighbors  Ask them to help you with laundry, cleaning, or errands  Also ask someone to watch the baby while you take a nap or relax  Ask your partner to help with the care of your baby  Pump some of your breast milk so your partner can feed your baby during the night  Exercise after delivery: Wait until your healthcare provider says it is okay to exercise  Exercise can help you lose weight, increase your energy levels, and manage your mood  It can also prevent constipation and blood clots  Start with gentle exercises such as walking  Do more as you have more energy  You may need to avoid abdominal exercises for 1 to 2 weeks after you deliver  Talk to your healthcare provider about an exercise plan that is right for you  Sexual activity after delivery:   · Do not have sex until your healthcare provider says it is okay  You may need to wait 4 to 6 weeks before you have sex  This may prevent infection and allow time to heal      · Your menstrual cycle may begin as soon as 3 weeks after you deliver  Your period may be delayed if you breastfeed your baby   You can become pregnant before you get your first postpartum period  Talk to your healthcare provider about birth control that is right for you  Some types of birth control are not safe during breastfeeding  For support and more information: Join a support group for new mothers  Ask for help from family and friends with chores, errands, and care of your baby  · Office of Women's Health,  Department of Health and Human Services  [address], [id number] [place] [address]  [address], [address] [place] [address]  Phone: 1- 757 - 120-8223  Web Address: www womenshealth gov  · [facility] Postpartum [address] [facility] , [address] [facility]  [address] [facility] , [address] [place] [address]  Web Address: [place] [facility]  org/pregnancy/postpartum-care  aspx  Follow up with your doctor or obstetrician as directed: You will need to follow up within 2 to 6 weeks of delivery  Write down your questions so you remember to ask them at your visits  © Copyright [address] Information is for End User's use only and may not be sold, redistributed or otherwise used for commercial purposes  All illustrations and images included in CareNotes® are the copyrighted property of A D A M , Inc  or [facility] Lashay rahul   The above information is an  only  It is not intended as medical advice for individual conditions or treatments  Talk to your doctor, nurse or pharmacist before following any medical regimen to see if it is safe and effective for you

## 2021-04-03 NOTE — PLAN OF CARE
Problem: PAIN - ADULT  Goal: Verbalizes/displays adequate comfort level or baseline comfort level  Description: Interventions:  - Encourage patient to monitor pain and request assistance  - Assess pain using appropriate pain scale  - Administer analgesics based on type and severity of pain and evaluate response  - Implement non-pharmacological measures as appropriate and evaluate response  - Consider cultural and social influences on pain and pain management  - Notify physician/advanced practitioner if interventions unsuccessful or patient reports new pain  Outcome: Progressing     Problem: INFECTION - ADULT  Goal: Absence or prevention of progression during hospitalization  Description: INTERVENTIONS:  - Assess and monitor for signs and symptoms of infection  - Monitor lab/diagnostic results  - Monitor all insertion sites, i e  indwelling lines, tubes, and drains  - Monitor endotracheal if appropriate and nasal secretions for changes in amount and color  - [place] appropriate cooling/warming therapies per order  - Administer medications as ordered  - Instruct and encourage patient and family to use good hand hygiene technique  - Identify and instruct in appropriate isolation precautions for identified infection/condition  Outcome: Progressing  Goal: Absence of fever/infection during neutropenic period  Description: INTERVENTIONS:  - Monitor WBC    Outcome: Progressing     Problem: SAFETY ADULT  Goal: Patient will remain free of falls  Description: INTERVENTIONS:  - Assess patient frequently for physical needs  -  Identify cognitive and physical deficits and behaviors that affect risk of falls    -  [place] fall precautions as indicated by assessment   - Educate patient/family on patient safety including physical limitations  - Instruct patient to call for assistance with activity based on assessment  - Modify environment to reduce risk of injury  - Consider OT/PT consult to assist with strengthening/mobility  Outcome: Progressing  Goal: Maintain or return to baseline ADL function  Description: INTERVENTIONS:  -  Assess patient's ability to carry out ADLs; assess patient's baseline for ADL function and identify physical deficits which impact ability to perform ADLs (bathing, care of mouth/teeth, toileting, grooming, dressing, etc )  - Assess/evaluate cause of self-care deficits   - Assess range of motion  - Assess patient's mobility; develop plan if impaired  - Assess patient's need for assistive devices and provide as appropriate  - Encourage maximum independence but intervene and supervise when necessary  - Involve family in performance of ADLs  - Assess for home care needs following discharge   - Consider OT consult to assist with ADL evaluation and planning for discharge  - Provide patient education as appropriate  Outcome: Progressing  Goal: Maintain or return mobility status to optimal level  Description: INTERVENTIONS:  - Assess patient's baseline mobility status (ambulation, transfers, stairs, etc )    - Identify cognitive and physical deficits and behaviors that affect mobility  - Identify mobility aids required to assist with transfers and/or ambulation (gait belt, sit-to-stand, lift, walker, cane, etc )  - [place] fall precautions as indicated by assessment  - Record patient progress and toleration of activity level on Mobility SBAR; progress patient to next Phase/Stage  - Instruct patient to call for assistance with activity based on assessment  - Consider rehabilitation consult to assist with strengthening/weightbearing, etc   Outcome: Progressing     Problem: Knowledge Deficit  Goal: Patient/family/caregiver demonstrates understanding of disease process, treatment plan, medications, and discharge instructions  Description: Complete learning assessment and assess knowledge base    Interventions:  - Provide teaching at level of understanding  - Provide teaching via preferred learning methods  Outcome: Progressing     Problem: DISCHARGE PLANNING  Goal: Discharge to home or other facility with appropriate resources  Description: INTERVENTIONS:  - Identify barriers to discharge w/patient and caregiver  - Arrange for needed discharge resources and transportation as appropriate  - Identify discharge learning needs (meds, wound care, etc )  - Arrange for interpretive services to assist at discharge as needed  - Refer to Case Management Department for coordinating discharge planning if the patient needs post-hospital services based on physician/advanced practitioner order or complex needs related to functional status, cognitive ability, or social support system  Outcome: Progressing     Problem: DISCHARGE PLANNING - CARE MANAGEMENT  Goal: Discharge to post-acute care or home with appropriate resources  Description: INTERVENTIONS:  - Conduct assessment to determine patient/family and health care team treatment goals, and need for post-acute services based on payer coverage, community resources, and patient preferences, and barriers to discharge  - Address psychosocial, clinical, and financial barriers to discharge as identified in assessment in conjunction with the patient/family and health care team  - Arrange appropriate level of post-acute services according to patients   needs and preference and payer coverage in collaboration with the physician and health care team  - Communicate with and update the patient/family, physician, and health care team regarding progress on the discharge plan  - Arrange appropriate transportation to post-acute venues  Outcome: Progressing     Problem: POSTPARTUM  Goal: Experiences normal postpartum course  Description: INTERVENTIONS:  - Monitor maternal vital signs  - Assess uterine involution and lochia  Outcome: Progressing  Goal: Appropriate maternal -  bonding  Description: INTERVENTIONS:  - Identify family support  - Assess for appropriate maternal/infant bonding   -Encourage maternal/infant bonding opportunities  - Referral to  or  as needed  Outcome: Progressing  Goal: Establishment of infant feeding pattern  Description: INTERVENTIONS:  - Assess breast/bottle feeding  - Refer to lactation as needed  Outcome: Progressing  Goal: Incision(s), wounds(s) or drain site(s) healing without S/S of infection  Description: INTERVENTIONS  - Assess and document risk factors for skin impairment   - Assess and document dressing, incision, wound bed, drain sites and surrounding tissue  - Consider nutrition services referral as needed  - Oral mucous membranes remain intact  - Provide patient/ family education  Outcome: Progressing

## 2021-04-03 NOTE — L&D DELIVERY NOTE
Vaginal Delivery Summary - OB/GYN   Elenita Whitney 27 y o  female MRN: [id number]  Unit/Bed#: -01 Encounter: [id number]          Pre-delivery Diagnosis:   Thirty-four weeks 5 days  Pre labor premature rupture of membranes    Post-delivery Diagnosis: same, delivered    Procedure: Spontaneous Vaginal Delivery     Attending: Dr Tadeo West    Assistant(s): Dr Bertha Estrada    Anesthesia: Epidural    QBL: 68 cc    Complications: none apparent    Specimens:   1  Arterial and venous cord gases  2  Cord blood  3  Segment of umbilical cord  4  Placenta to pathology    Findings:  1  Viable female on 2021 at [address], with APGARS of 9 and 9 at 1 and 5 minutes respectively,  2  Spontaneous delivery of intact placenta at 1943  3  1 degree laceration repaired with 4-0 Vicryl     Gases:  Umbilical Cord Venous Blood Gas:  Results from last 7 days   Lab Units 21   PH COV  7 293   PCO2 COV mm HG 46 2*   HCO3 COV mmol/L 21 9   BASE EXC COV mmol/L -4 8*   O2 CT CD VB mL/dL 12 2   O2 HGB, VENOUS CORD % 52 7     Umbilical Cord Arterial Blood Gas:  Results from last 7 days   Lab Units 21   PH COA  7 291   PCO2 COA  48 6   PO2 COA mm HG 24 2   HCO3 COA mmol/L 22 9   BASE EXC COA mmol/L -4 1*   O2 CONTENT CORD ART ml/dl 12 7   O2 HGB, ARTERIAL CORD % 56 1       Disposition:  The patient and the  both tolerated the procedure well and are recovering in labor and delivery room       Brief history and labor course:  Elenita Whitney is a 27 y o  [id number] [id number]   She presented to labor and delivery for premature pre-labor rupture of membranes  She received a full course of betamethasone  She was started on 1 g of azithromycin and pelvis cell and for latency antibiotics  A gonorrhea chlamydia and GBS culture was collected  She received 1 dose of Cytotec and started having late decelerations  After her strip return to category 1, she was started on Pitocin    She quickly made change to be complete and started pushing    Description of procedure:    Patient delivered a viable female , wt pending, apgars of 9 (1 min) and 9 (5 min)  The fetal vertex delivered direct OA position spontaneously  There was no nuchal cord  The right anterior shoulder delivered atraumatically with maternal expulsive forces and the assistance of gentle downward traction  The left posterior shoulder delivered with maternal expulsive forces and the assistance of gentle upward traction  The remainder of the fetus delivered spontaneously  Upon delivery, the infant was placed on the mothers abdomen and the cord was doubly clamped and cut  Delayed cord clamping was achieved  The infant was noted to cry spontaneously and was moving all extremities appropriately  There was no evidence for injury  Awaiting nurse resuscitators evaluated the   Arterial and venous cord blood gases and cord blood was collected for analysis  These were promptly sent to the lab  In the immediate post-partum, active management of the 3rd stage of labor was performed with massage, the administration of 30 units of IV pitocin, and gentle traction on the umbilical cord  The placenta delivered spontaneously and was noted to have a centrally inserted 3 vessel cord  The placenta was sent to pathology     Laceration Repair  The vagina, cervix, perineum, and rectum were inspected and there was noted to be first-degree laceration which repaired in a running, non locked fashion    At the conclusion of the procedure, all needle, sponge, and instrument counts were noted to be correct  Patient tolerated the procedure well and was allowed to recover in labor and delivery room with family and  before being transferred to the post-partum floor  Dr Humberto Gaffney was present and participated in all key portions of the case  Clemente Leija 89, DO  OB/GYN PGY-1  2021  8:06 PM

## 2021-04-03 NOTE — PROGRESS NOTES
Progress Note - OB/GYN   Barbi Green 27 y o  female MRN: [id number]  Unit/Bed#: -01 Encounter: [id number]    Assessment:  PPD#1 s/p Spontaneous Vaginal Delivery at [address]:    1) Postpartum care  Encourage ambulation   Encourage breastfeeding  Continue current meds   2) Delivery at 29 weeks   Baby is in the NICU  3) Disposition   Anticipate discharge home tomorrow    Subjective/Objective     Subjective:     Pain: no  Tolerating PO: yes  Voiding: yes  Flatus: yes  BM: yes  Ambulating: yes  Breastfeeding: Breastfeeding  Chest pain: no  Shortness of breath: no  Leg pain: no  Lochia: minimal    Objective:     Vitals:  Vitals:    04/02/21 2142 04/02/21 2157 04/02/21 2213 04/03/21 0027   BP: 121/71 121/72 134/60 101/62   BP Location:  Left arm  Left arm   Pulse: (!) 110 (!) 114 (!) 131 86   Resp:  16  16   Temp:    98 3 °F (36 8 °C)   TempSrc:    Oral   SpO2:    95%   Weight:       Height:           Physical Exam:   GEN: appears well, alert and oriented x 3, pleasant and cooperative   CV: +S1, +S2, no murmurs/rubs/gallops appreciated  RESP: no labored breathing  ABDOMEN: soft, no tenderness, no distention, Uterine fundus firm and non-tender, -1 cm below the umbilicus   EXTREMITIES: non-tender  NEURO Alert and oriented to person, place, and time         Lab Results   Component Value Date    WBC 9 25 04/01/2021    HGB 13 5 04/01/2021    HCT 39 7 04/01/2021    MCV 97 04/01/2021     04/01/2021         Rebel Elizabeth DO, PGY-1  Obstetrics & Gynecology  04/03/21

## 2021-04-03 NOTE — ANESTHESIA POSTPROCEDURE EVALUATION
Post-Op Assessment Note    CV Status:  Stable  Pain Score: 0    Pain management: adequate     Mental Status:  Alert and awake   Hydration Status:  Euvolemic   PONV Controlled:  Controlled   Airway Patency:  Patent      Post Op Vitals Reviewed: Yes        Post-op block assessment: catheter intact and no complications      No complications documented      /73 (04/02/21 2057)    Temp      Pulse 93 (04/02/21 2057)   Resp 16 (04/02/21 2057)    SpO2

## 2021-04-03 NOTE — LACTATION NOTE
Mom has been pumping and has obtained small amount of colostrum so far  Reviewed technique, use of breast massage and hand expression and frequency of pumping  Encouraged to call for assistance as needed  Given handout on increasing milk supply for an infant in NICU

## 2021-04-04 VITALS
DIASTOLIC BLOOD PRESSURE: 84 MMHG | HEART RATE: 94 BPM | HEIGHT: 63 IN | RESPIRATION RATE: 18 BRPM | SYSTOLIC BLOOD PRESSURE: 140 MMHG | OXYGEN SATURATION: 97 % | BODY MASS INDEX: 35.44 KG/M2 | TEMPERATURE: 98.1 F | WEIGHT: 200 LBS

## 2021-04-04 PROCEDURE — 99024 POSTOP FOLLOW-UP VISIT: CPT | Performed by: OBSTETRICS & GYNECOLOGY

## 2021-04-04 RX ORDER — DOCUSATE SODIUM 100 MG/1
100 CAPSULE, LIQUID FILLED ORAL 2 TIMES DAILY
Qty: 10 CAPSULE | Refills: 0
Start: 2021-04-04 | End: 2021-04-04

## 2021-04-04 RX ORDER — ACETAMINOPHEN 325 MG/1
650 TABLET ORAL EVERY 4 HOURS PRN
Refills: 0
Start: 2021-04-04 | End: 2021-04-21 | Stop reason: ALTCHOICE

## 2021-04-04 RX ORDER — DOCUSATE SODIUM 100 MG/1
100 CAPSULE, LIQUID FILLED ORAL 2 TIMES DAILY PRN
Qty: 10 CAPSULE | Refills: 0
Start: 2021-04-04 | End: 2021-04-21 | Stop reason: ALTCHOICE

## 2021-04-04 RX ORDER — IBUPROFEN 600 MG/1
600 TABLET ORAL EVERY 6 HOURS PRN
Qty: 30 TABLET | Refills: 0
Start: 2021-04-04 | End: 2021-04-21 | Stop reason: ALTCHOICE

## 2021-04-04 RX ADMIN — IBUPROFEN 600 MG: 600 TABLET, FILM COATED ORAL at 09:51

## 2021-04-04 RX ADMIN — DOCUSATE SODIUM 100 MG: 100 CAPSULE, LIQUID FILLED ORAL at 09:51

## 2021-04-04 NOTE — PROGRESS NOTES
Progress Note - OB/GYN   Rob Coelho 27 y o  female MRN: [id number]  Unit/Bed#: -01 Encounter: [id number]    Assessment:  PPD#2 s/p Spontaneous Vaginal Delivery at [address]:    1) Postpartum care  Encourage ambulation   Encourage breastfeeding  Continue current meds   2) Delivery at 29 weeks   Baby is in the NICU  3) Birth control   Contraceptive options were reviewed, including hormonal methods, both combination (pill, patch, vaginal ring) and progesterone-only (pill, Depo Provera and Nexplanon), intrauterine devices (Mirena, Annita and Paraguard), barrier methods (condoms, diaphragm) and male/female sterilization  The mechanisms, risks, benefits and side effects of all methods were discussed  All questions have been answered to her satisfaction  4) Disposition   Anticipate discharge home today    Subjective/Objective     Subjective:     Pain: no  Tolerating PO: yes  Voiding: yes  Flatus: yes  BM: yes  Ambulating: yes  Breastfeeding: Breastfeeding  Chest pain: no  Shortness of breath: no  Leg pain: no  Lochia: minimal    Objective:     Vitals:  Vitals:    04/03/21 0925 04/03/21 1331 04/03/21 1604 04/04/21 0054   BP: 125/86 128/80 117/71 117/73   BP Location: Left arm Left arm Left arm Left arm   Pulse: 89 69 80 75   Resp: 18 18 18 18   Temp: 98 3 °F (36 8 °C) 97 6 °F (36 4 °C) 98 2 °F (36 8 °C) 98 4 °F (36 9 °C)   TempSrc: Oral Oral Oral Oral   SpO2:   96% 97%   Weight:       Height:           Physical Exam:   GEN: appears well, alert and oriented x 3, pleasant and cooperative   CV: +S1, +S2, no murmurs/rubs/gallops appreciated  RESP: no labored breathing  ABDOMEN: soft, no tenderness, no distention, Uterine fundus firm and non-tender, -1 cm below the umbilicus   EXTREMITIES: non-tender  NEURO Alert and oriented to person, place, and time         Lab Results   Component Value Date    WBC 9 25 04/01/2021    HGB 13 5 04/01/2021    HCT 39 7 04/01/2021    MCV 97 04/01/2021     04/01/2021 Meliza Peraza DO, PGY-1  Obstetrics & Gynecology  04/04/21

## 2021-04-04 NOTE — PLAN OF CARE
Problem: PAIN - ADULT  Goal: Verbalizes/displays adequate comfort level or baseline comfort level  Description: Interventions:  - Encourage patient to monitor pain and request assistance  - Assess pain using appropriate pain scale  - Administer analgesics based on type and severity of pain and evaluate response  - Implement non-pharmacological measures as appropriate and evaluate response  - Consider cultural and social influences on pain and pain management  - Notify physician/advanced practitioner if interventions unsuccessful or patient reports new pain  Outcome: Progressing     Problem: INFECTION - ADULT  Goal: Absence or prevention of progression during hospitalization  Description: INTERVENTIONS:  - Assess and monitor for signs and symptoms of infection  - Monitor lab/diagnostic results  - Monitor all insertion sites, i e  indwelling lines, tubes, and drains  - Monitor endotracheal if appropriate and nasal secretions for changes in amount and color  - [place] appropriate cooling/warming therapies per order  - Administer medications as ordered  - Instruct and encourage patient and family to use good hand hygiene technique  - Identify and instruct in appropriate isolation precautions for identified infection/condition  Outcome: Progressing  Goal: Absence of fever/infection during neutropenic period  Description: INTERVENTIONS:  - Monitor WBC    Outcome: Progressing     Problem: SAFETY ADULT  Goal: Patient will remain free of falls  Description: INTERVENTIONS:  - Assess patient frequently for physical needs  -  Identify cognitive and physical deficits and behaviors that affect risk of falls    -  [place] fall precautions as indicated by assessment   - Educate patient/family on patient safety including physical limitations  - Instruct patient to call for assistance with activity based on assessment  - Modify environment to reduce risk of injury  - Consider OT/PT consult to assist with strengthening/mobility  Outcome: Progressing  Goal: Maintain or return to baseline ADL function  Description: INTERVENTIONS:  -  Assess patient's ability to carry out ADLs; assess patient's baseline for ADL function and identify physical deficits which impact ability to perform ADLs (bathing, care of mouth/teeth, toileting, grooming, dressing, etc )  - Assess/evaluate cause of self-care deficits   - Assess range of motion  - Assess patient's mobility; develop plan if impaired  - Assess patient's need for assistive devices and provide as appropriate  - Encourage maximum independence but intervene and supervise when necessary  - Involve family in performance of ADLs  - Assess for home care needs following discharge   - Consider OT consult to assist with ADL evaluation and planning for discharge  - Provide patient education as appropriate  Outcome: Progressing  Goal: Maintain or return mobility status to optimal level  Description: INTERVENTIONS:  - Assess patient's baseline mobility status (ambulation, transfers, stairs, etc )    - Identify cognitive and physical deficits and behaviors that affect mobility  - Identify mobility aids required to assist with transfers and/or ambulation (gait belt, sit-to-stand, lift, walker, cane, etc )  - [place] fall precautions as indicated by assessment  - Record patient progress and toleration of activity level on Mobility SBAR; progress patient to next Phase/Stage  - Instruct patient to call for assistance with activity based on assessment  - Consider rehabilitation consult to assist with strengthening/weightbearing, etc   Outcome: Progressing     Problem: Knowledge Deficit  Goal: Patient/family/caregiver demonstrates understanding of disease process, treatment plan, medications, and discharge instructions  Description: Complete learning assessment and assess knowledge base    Interventions:  - Provide teaching at level of understanding  - Provide teaching via preferred learning methods  Outcome: Progressing     Problem: DISCHARGE PLANNING  Goal: Discharge to home or other facility with appropriate resources  Description: INTERVENTIONS:  - Identify barriers to discharge w/patient and caregiver  - Arrange for needed discharge resources and transportation as appropriate  - Identify discharge learning needs (meds, wound care, etc )  - Arrange for interpretive services to assist at discharge as needed  - Refer to Case Management Department for coordinating discharge planning if the patient needs post-hospital services based on physician/advanced practitioner order or complex needs related to functional status, cognitive ability, or social support system  Outcome: Progressing     Problem: DISCHARGE PLANNING - CARE MANAGEMENT  Goal: Discharge to post-acute care or home with appropriate resources  Description: INTERVENTIONS:  - Conduct assessment to determine patient/family and health care team treatment goals, and need for post-acute services based on payer coverage, community resources, and patient preferences, and barriers to discharge  - Address psychosocial, clinical, and financial barriers to discharge as identified in assessment in conjunction with the patient/family and health care team  - Arrange appropriate level of post-acute services according to patients   needs and preference and payer coverage in collaboration with the physician and health care team  - Communicate with and update the patient/family, physician, and health care team regarding progress on the discharge plan  - Arrange appropriate transportation to post-acute venues  Outcome: Progressing     Problem: POSTPARTUM  Goal: Experiences normal postpartum course  Description: INTERVENTIONS:  - Monitor maternal vital signs  - Assess uterine involution and lochia  Outcome: Progressing  Goal: Appropriate maternal -  bonding  Description: INTERVENTIONS:  - Identify family support  - Assess for appropriate maternal/infant bonding   -Encourage maternal/infant bonding opportunities  - Referral to  or  as needed  Outcome: Progressing  Goal: Establishment of infant feeding pattern  Description: INTERVENTIONS:  - Assess breast/bottle feeding  - Refer to lactation as needed  Outcome: Progressing  Goal: Incision(s), wounds(s) or drain site(s) healing without S/S of infection  Description: INTERVENTIONS  - Assess and document risk factors for skin impairment   - Assess and document dressing, incision, wound bed, drain sites and surrounding tissue  - Consider nutrition services referral as needed  - Oral mucous membranes remain intact  - Provide patient/ family education  Outcome: Progressing

## 2021-04-04 NOTE — PLAN OF CARE
Problem: PAIN - ADULT  Goal: Verbalizes/displays adequate comfort level or baseline comfort level  Description: Interventions:  - Encourage patient to monitor pain and request assistance  - Assess pain using appropriate pain scale  - Administer analgesics based on type and severity of pain and evaluate response  - Implement non-pharmacological measures as appropriate and evaluate response  - Consider cultural and social influences on pain and pain management  - Notify physician/advanced practitioner if interventions unsuccessful or patient reports new pain  4/4/2021 1241 by Chloe Gallo RN  Outcome: Completed  4/4/2021 1017 by Chloe Gallo RN  Outcome: Progressing     Problem: INFECTION - ADULT  Goal: Absence or prevention of progression during hospitalization  Description: INTERVENTIONS:  - Assess and monitor for signs and symptoms of infection  - Monitor lab/diagnostic results  - Monitor all insertion sites, i e  indwelling lines, tubes, and drains  - Monitor endotracheal if appropriate and nasal secretions for changes in amount and color  - [place] appropriate cooling/warming therapies per order  - Administer medications as ordered  - Instruct and encourage patient and family to use good hand hygiene technique  - Identify and instruct in appropriate isolation precautions for identified infection/condition  4/4/2021 1241 by Chloe Gallo RN  Outcome: Completed  4/4/2021 1017 by Chloe Gallo RN  Outcome: Progressing  Goal: Absence of fever/infection during neutropenic period  Description: INTERVENTIONS:  - Monitor WBC    4/4/2021 1241 by Chloe Gallo RN  Outcome: Completed  4/4/2021 1017 by Chloe Gallo RN  Outcome: Progressing     Problem: SAFETY ADULT  Goal: Patient will remain free of falls  Description: INTERVENTIONS:  - Assess patient frequently for physical needs  -  Identify cognitive and physical deficits and behaviors that affect risk of falls    -  [place] fall precautions as indicated by assessment   - Educate patient/family on patient safety including physical limitations  - Instruct patient to call for assistance with activity based on assessment  - Modify environment to reduce risk of injury  - Consider OT/PT consult to assist with strengthening/mobility  4/4/2021 1241 by Keny Kidd RN  Outcome: Completed  4/4/2021 1017 by Keny Kidd RN  Outcome: Progressing  Goal: Maintain or return to baseline ADL function  Description: INTERVENTIONS:  -  Assess patient's ability to carry out ADLs; assess patient's baseline for ADL function and identify physical deficits which impact ability to perform ADLs (bathing, care of mouth/teeth, toileting, grooming, dressing, etc )  - Assess/evaluate cause of self-care deficits   - Assess range of motion  - Assess patient's mobility; develop plan if impaired  - Assess patient's need for assistive devices and provide as appropriate  - Encourage maximum independence but intervene and supervise when necessary  - Involve family in performance of ADLs  - Assess for home care needs following discharge   - Consider OT consult to assist with ADL evaluation and planning for discharge  - Provide patient education as appropriate  4/4/2021 1241 by Keny Kidd RN  Outcome: Completed  4/4/2021 1017 by Keny Kidd RN  Outcome: Progressing  Goal: Maintain or return mobility status to optimal level  Description: INTERVENTIONS:  - Assess patient's baseline mobility status (ambulation, transfers, stairs, etc )    - Identify cognitive and physical deficits and behaviors that affect mobility  - Identify mobility aids required to assist with transfers and/or ambulation (gait belt, sit-to-stand, lift, walker, cane, etc )  - [place] fall precautions as indicated by assessment  - Record patient progress and toleration of activity level on Mobility SBAR; progress patient to next Phase/Stage  - Instruct patient to call for assistance with activity based on assessment  - Consider rehabilitation consult to assist with strengthening/weightbearing, etc   4/4/2021 1241 by Dwight Roy RN  Outcome: Completed  4/4/2021 1017 by Dwight Roy RN  Outcome: Progressing     Problem: Knowledge Deficit  Goal: Patient/family/caregiver demonstrates understanding of disease process, treatment plan, medications, and discharge instructions  Description: Complete learning assessment and assess knowledge base    Interventions:  - Provide teaching at level of understanding  - Provide teaching via preferred learning methods  4/4/2021 1241 by Dwight Roy RN  Outcome: Completed  4/4/2021 1017 by Dwight Roy RN  Outcome: Progressing     Problem: DISCHARGE PLANNING  Goal: Discharge to home or other facility with appropriate resources  Description: INTERVENTIONS:  - Identify barriers to discharge w/patient and caregiver  - Arrange for needed discharge resources and transportation as appropriate  - Identify discharge learning needs (meds, wound care, etc )  - Arrange for interpretive services to assist at discharge as needed  - Refer to Case Management Department for coordinating discharge planning if the patient needs post-hospital services based on physician/advanced practitioner order or complex needs related to functional status, cognitive ability, or social support system  4/4/2021 1241 by Dwight Roy RN  Outcome: Completed  4/4/2021 1017 by Dwight Roy RN  Outcome: Progressing     Problem: DISCHARGE PLANNING - CARE MANAGEMENT  Goal: Discharge to post-acute care or home with appropriate resources  Description: INTERVENTIONS:  - Conduct assessment to determine patient/family and health care team treatment goals, and need for post-acute services based on payer coverage, community resources, and patient preferences, and barriers to discharge  - Address psychosocial, clinical, and financial barriers to discharge as identified in assessment in conjunction with the patient/family and health care team  - Arrange appropriate level of post-acute services according to patients   needs and preference and payer coverage in collaboration with the physician and health care team  - Communicate with and update the patient/family, physician, and health care team regarding progress on the discharge plan  - Arrange appropriate transportation to post-acute venues  2021 1241 by Caprice Roche RN  Outcome: Completed  2021 1017 by Caprice Roche RN  Outcome: Progressing     Problem: POSTPARTUM  Goal: Experiences normal postpartum course  Description: INTERVENTIONS:  - Monitor maternal vital signs  - Assess uterine involution and lochia  2021 1241 by Caprice Roche RN  Outcome: Completed  2021 1017 by Caprice Roche RN  Outcome: Progressing  Goal: Appropriate maternal -  bonding  Description: INTERVENTIONS:  - Identify family support  - Assess for appropriate maternal/infant bonding   -Encourage maternal/infant bonding opportunities  - Referral to  or  as needed  2021 [id number] by Caprice Roche RN  Outcome: Completed  2021 1017 by Caprice Roche RN  Outcome: Progressing  Goal: Establishment of infant feeding pattern  Description: INTERVENTIONS:  - Assess breast/bottle feeding  - Refer to lactation as needed  2021 1241 by Caprice Roche RN  Outcome: Completed  2021 by Caprice Roche RN  Outcome: Progressing  Goal: Incision(s), wounds(s) or drain site(s) healing without S/S of infection  Description: INTERVENTIONS  - Assess and document risk factors for skin impairment   - Assess and document dressing, incision, wound bed, drain sites and surrounding tissue  - Consider nutrition services referral as needed  - Oral mucous membranes remain intact  - Provide patient/ family education  2021 1241 by Caprice Roche RN  Outcome: Completed  4/4/2021 1017 by Keny Kidd, RN  Outcome: Progressing

## 2021-04-04 NOTE — LACTATION NOTE
Mom continues to pump for her infant in NICU and is getting some small amounts of colostrum  Stressed continued frequency of pumping  Discussed triple feeds and where to obtain assistance after discharge  Reviewed engorgement relief measures  Given discharge breastfeeding pkat and same reviewed

## 2021-04-04 NOTE — PLAN OF CARE
Problem: PAIN - ADULT  Goal: Verbalizes/displays adequate comfort level or baseline comfort level  Description: Interventions:  - Encourage patient to monitor pain and request assistance  - Assess pain using appropriate pain scale  - Administer analgesics based on type and severity of pain and evaluate response  - Implement non-pharmacological measures as appropriate and evaluate response  - Consider cultural and social influences on pain and pain management  - Notify physician/advanced practitioner if interventions unsuccessful or patient reports new pain  Outcome: Progressing     Problem: INFECTION - ADULT  Goal: Absence or prevention of progression during hospitalization  Description: INTERVENTIONS:  - Assess and monitor for signs and symptoms of infection  - Monitor lab/diagnostic results  - Monitor all insertion sites, i e  indwelling lines, tubes, and drains  - Monitor endotracheal if appropriate and nasal secretions for changes in amount and color  - [place] appropriate cooling/warming therapies per order  - Administer medications as ordered  - Instruct and encourage patient and family to use good hand hygiene technique  - Identify and instruct in appropriate isolation precautions for identified infection/condition  Outcome: Progressing  Goal: Absence of fever/infection during neutropenic period  Description: INTERVENTIONS:  - Monitor WBC    Outcome: Progressing     Problem: SAFETY ADULT  Goal: Patient will remain free of falls  Description: INTERVENTIONS:  - Assess patient frequently for physical needs  -  Identify cognitive and physical deficits and behaviors that affect risk of falls    -  [place] fall precautions as indicated by assessment   - Educate patient/family on patient safety including physical limitations  - Instruct patient to call for assistance with activity based on assessment  - Modify environment to reduce risk of injury  - Consider OT/PT consult to assist with strengthening/mobility  Outcome: Progressing  Goal: Maintain or return to baseline ADL function  Description: INTERVENTIONS:  -  Assess patient's ability to carry out ADLs; assess patient's baseline for ADL function and identify physical deficits which impact ability to perform ADLs (bathing, care of mouth/teeth, toileting, grooming, dressing, etc )  - Assess/evaluate cause of self-care deficits   - Assess range of motion  - Assess patient's mobility; develop plan if impaired  - Assess patient's need for assistive devices and provide as appropriate  - Encourage maximum independence but intervene and supervise when necessary  - Involve family in performance of ADLs  - Assess for home care needs following discharge   - Consider OT consult to assist with ADL evaluation and planning for discharge  - Provide patient education as appropriate  Outcome: Progressing  Goal: Maintain or return mobility status to optimal level  Description: INTERVENTIONS:  - Assess patient's baseline mobility status (ambulation, transfers, stairs, etc )    - Identify cognitive and physical deficits and behaviors that affect mobility  - Identify mobility aids required to assist with transfers and/or ambulation (gait belt, sit-to-stand, lift, walker, cane, etc )  - [place] fall precautions as indicated by assessment  - Record patient progress and toleration of activity level on Mobility SBAR; progress patient to next Phase/Stage  - Instruct patient to call for assistance with activity based on assessment  - Consider rehabilitation consult to assist with strengthening/weightbearing, etc   Outcome: Progressing     Problem: Knowledge Deficit  Goal: Patient/family/caregiver demonstrates understanding of disease process, treatment plan, medications, and discharge instructions  Description: Complete learning assessment and assess knowledge base    Interventions:  - Provide teaching at level of understanding  - Provide teaching via preferred learning methods  Outcome: Progressing     Problem: DISCHARGE PLANNING  Goal: Discharge to home or other facility with appropriate resources  Description: INTERVENTIONS:  - Identify barriers to discharge w/patient and caregiver  - Arrange for needed discharge resources and transportation as appropriate  - Identify discharge learning needs (meds, wound care, etc )  - Arrange for interpretive services to assist at discharge as needed  - Refer to Case Management Department for coordinating discharge planning if the patient needs post-hospital services based on physician/advanced practitioner order or complex needs related to functional status, cognitive ability, or social support system  Outcome: Progressing     Problem: DISCHARGE PLANNING - CARE MANAGEMENT  Goal: Discharge to post-acute care or home with appropriate resources  Description: INTERVENTIONS:  - Conduct assessment to determine patient/family and health care team treatment goals, and need for post-acute services based on payer coverage, community resources, and patient preferences, and barriers to discharge  - Address psychosocial, clinical, and financial barriers to discharge as identified in assessment in conjunction with the patient/family and health care team  - Arrange appropriate level of post-acute services according to patients   needs and preference and payer coverage in collaboration with the physician and health care team  - Communicate with and update the patient/family, physician, and health care team regarding progress on the discharge plan  - Arrange appropriate transportation to post-acute venues  Outcome: Progressing     Problem: POSTPARTUM  Goal: Experiences normal postpartum course  Description: INTERVENTIONS:  - Monitor maternal vital signs  - Assess uterine involution and lochia  Outcome: Progressing  Goal: Appropriate maternal -  bonding  Description: INTERVENTIONS:  - Identify family support  - Assess for appropriate maternal/infant bonding   -Encourage maternal/infant bonding opportunities  - Referral to  or  as needed  Outcome: Progressing  Goal: Establishment of infant feeding pattern  Description: INTERVENTIONS:  - Assess breast/bottle feeding  - Refer to lactation as needed  Outcome: Progressing  Goal: Incision(s), wounds(s) or drain site(s) healing without S/S of infection  Description: INTERVENTIONS  - Assess and document risk factors for skin impairment   - Assess and document dressing, incision, wound bed, drain sites and surrounding tissue  - Consider nutrition services referral as needed  - Oral mucous membranes remain intact  - Provide patient/ family education  Outcome: Progressing

## 2021-04-05 NOTE — UTILIZATION REVIEW
Notification of Maternity/Delivery & Gay Birth Information for Admission - this was a Medical Maternity Case she has  Delivered and discharged   Notification of Maternity/Delivery for Admission to our facility [facility]  Be advised that this patient was admitted to our facility under Inpatient Status  Contact Resaca Puma at [phone] for additional admission information  Dorothy Goodwin PARENT/CHILD HEALTH UR DEPT DEDICATED Ami Del [place]le [phone]  Mother & Gay Information   Patient Name: Elenita Whitney   YOB: 1995   Delivering clinician: Estefani Peralta   OB History        1    Para   1    Term   0       1    AB   0    Living   1       SAB   0    TAB   0    Ectopic   0    Multiple   0    Live Births   1               Gay Name & MRN:   Information for the patient's :  Annabel Fuentes) [[id number]]      Delivery Information:  Sex: female  Delivered 2021 7:39 PM by Vaginal, Spontaneous; Gestational Age: 35w7d    Gay Measurements:  Weight: 4 lb 12 oz (2155 g); Height: 17 5"    APGAR 1 minute 5 minutes 10 minutes   Totals: 9 9      Gay Birth Information: 27 y o  female MRN: [id number] Unit/Bed#: -01 Estimated Date of Delivery: 21  Birthweight: No birth weight on file   Gestational Age: <None> Delivery Type: Vaginal, Spontaneous      Authorization Information   Servicing Facility:   [address] [place] [address]  Tax ID: [id number]  NPI: [id number] Attending Provider/NPI:  Phone:  Address: [place], [place] [[id number]]  [phone]  Same as Stephen Lennon 1106 of Service Code:  24 Place of Service Name: [address] [place] [address]   Start Date: 21 1002   Discharge Date & Time: 2021  1:10 PM    Type of Admission: Inpatient Status Discharge Disposition (if discharged): Home/Self Care   Patient Diagnoses:   34 weeks gestation of pregnancy [Z3A 34]  Encounter for full-term uncomplicated delivery [O80]  The primary encounter diagnosis was 34 weeks gestation of pregnancy  Diagnoses of  premature rupture of membranes (PPROM) with unknown onset of labor and  (spontaneous vaginal delivery) were also pertinent to this visit  1  34 weeks gestation of pregnancy    2   premature rupture of membranes (PPROM) with unknown onset of labor    3   (spontaneous vaginal delivery)       Orders: Admission Orders (From admission, onward)     Ordered        21 1002  Inpatient Admission  Once                    Assigned Utilization Review Contact: Huy Del Angel  Utilization   Network Utilization Review Department  Phone: [phone]; Fax [phone]  Email: [email or web address]  [facility]

## 2021-04-21 ENCOUNTER — POSTPARTUM VISIT (OUTPATIENT)
Dept: OBGYN CLINIC | Facility: CLINIC | Age: 26
End: 2021-04-21

## 2021-04-21 VITALS — WEIGHT: 177 LBS | BODY MASS INDEX: 31.35 KG/M2 | TEMPERATURE: 98 F

## 2021-04-21 PROBLEM — Z3A.34 34 WEEKS GESTATION OF PREGNANCY: Status: RESOLVED | Noted: 2021-04-01 | Resolved: 2021-04-21

## 2021-04-21 PROBLEM — O42.919 PRETERM PREMATURE RUPTURE OF MEMBRANES (PPROM) WITH UNKNOWN ONSET OF LABOR: Status: RESOLVED | Noted: 2021-04-01 | Resolved: 2021-04-21

## 2021-04-21 PROCEDURE — 99024 POSTOP FOLLOW-UP VISIT: CPT | Performed by: OBSTETRICS & GYNECOLOGY

## 2021-04-21 NOTE — PROGRESS NOTES
Assessment/Plan:    Normal postpartum check status post vaginal delivery   Released for normal activity   Plans barrier methods for contraception   Return to office for annual exam        Problem List Items Addressed This Visit        Other     (spontaneous vaginal delivery) - Primary    Postpartum care following vaginal delivery            Subjective:      Patient ID: Nuris Ernandez is a 27 y o  female  Face is here today for postpartum check  She delivered vaginally at 34 weeks and 5 days on 2021  Baby is doing well and is currently home after spending 2 weeks in the  intensive care unit  She is breast pumping and feeding breast milk that is fortified  She has no breast complaints  Bowels and bladder are normal   Lochia is decreasing  She is unsure about what type of contraceptive to using we have encouraged her to consider barrier methods at this time  We can review other options at her annual exam of course earlier as needed  The following portions of the patient's history were reviewed and updated as appropriate: allergies, current medications, past family history, past medical history, past social history, past surgical history and problem list     Review of Systems   Constitutional: Negative for fatigue, fever and unexpected weight change  HENT: Negative for dental problem, mouth sores, nosebleeds, rhinorrhea, sinus pressure, sinus pain and sore throat  Eyes: Negative for pain, discharge and visual disturbance  Respiratory: Negative for cough, chest tightness, shortness of breath and wheezing  Cardiovascular: Negative for chest pain, palpitations and leg swelling  Gastrointestinal: Negative for blood in stool, constipation, diarrhea, nausea and vomiting  Endocrine: Negative for polydipsia  Genitourinary: Negative for difficulty urinating, dyspareunia, dysuria, menstrual problem, pelvic pain, urgency, vaginal discharge and vaginal pain     Musculoskeletal: Negative for arthralgias, back pain and joint swelling  Allergic/Immunologic: Negative for environmental allergies  Neurological: Negative for seizures, light-headedness and headaches  Hematological: Does not bruise/bleed easily  Psychiatric/Behavioral: Negative for sleep disturbance  The patient is not nervous/anxious  Objective:      Temp 98 °F (36 7 °C) (Temporal)   Wt 80 3 kg (177 lb)   LMP 08/02/2020 (Exact Date)   BMI 31 35 kg/m²          Physical Exam  Vitals signs reviewed  Constitutional:       General: She is not in acute distress  Appearance: Normal appearance  She is well-developed and normal weight  She is not ill-appearing  Comments: Young white female   HENT:      Head: Normocephalic and atraumatic  Abdominal:      General: There is no distension  Palpations: Abdomen is soft  There is no mass  Tenderness: There is no abdominal tenderness  There is no guarding or rebound  Genitourinary:     Comments: Perineum intact  Cervix closed  Uterus well involuted, mobile and nontender  Neurological:      Mental Status: She is alert and oriented to person, place, and time  Psychiatric:         Behavior: Behavior normal          Thought Content: Thought content normal          Judgment: Judgment normal       Comments: Postpartum depression score equal 5

## 2021-07-22 ENCOUNTER — ANNUAL EXAM (OUTPATIENT)
Dept: OBGYN CLINIC | Facility: CLINIC | Age: 26
End: 2021-07-22
Payer: COMMERCIAL

## 2021-07-22 VITALS — BODY MASS INDEX: 29.05 KG/M2 | DIASTOLIC BLOOD PRESSURE: 70 MMHG | SYSTOLIC BLOOD PRESSURE: 118 MMHG | WEIGHT: 164 LBS

## 2021-07-22 DIAGNOSIS — Z01.419 ENCNTR FOR GYN EXAM (GENERAL) (ROUTINE) W/O ABN FINDINGS: Primary | ICD-10-CM

## 2021-07-22 PROCEDURE — 99395 PREV VISIT EST AGE 18-39: CPT | Performed by: NURSE PRACTITIONER

## 2021-07-22 NOTE — PROGRESS NOTES
Assessment/Plan   Diagnoses and all orders for this visit:    Encntr for gyn exam (general) (routine) w/o abn findings        Discussion    Reviewed with patient normal exam today  Pap deferred today  Reviewed barrier method for contraception  Normal breast exam today  Monthly SBEs advised  Vitamin D and [place] Supplements advised  Exercise most days of the week  Follow with PCP for regular check-ups and blood work  RTO 1 year for annual or sooner as needed  Jericho Bruce is a 27 y o  female who presents for annual well woman exam  She is 3 months postpartum and doing well  Last exam 10/14/2020 Pap Normal  Pap guidelines reviewed with patient  Pap deferred today  Pt denies any abnormal vaginal discharge, itching, or odor  Pt in a mutually exclusive relationship () with a male partner and denies the need for STD testing today  Menstrual Cycle:  LMP: 2021  Period Cycle (Days): 28  Period Duration (Days): 5  Period Pattern: Regular  Menstrual Flow: Moderate  Menstrual Control: Tampon, Maxi pad  Dysmenorrhea: None  Menses returned in  after delivery  Continues to breast feed without any issues  OB History     G 1 P 1   Contraception: barrier method  Practices monthly SBEs, no breast complaints today  Denies any bowel or bladder issues  Pt follows with PCP for regular check-ups and blood work  Review of Systems   All other systems reviewed and are negative        The following portions of the patient's history were reviewed and updated as appropriate: allergies, current medications, past family history, past medical history, past social history, past surgical history and problem list     Period Cycle (Days): 28  Period Duration (Days): 5  Period Pattern: Regular  Menstrual Flow: Moderate  Menstrual Control: Tampon, Maxi pad  Dysmenorrhea: None    OB History        1    Para   1    Term   0       1    AB   0    Living   1       SAB   0    TAB   0 Ectopic   0    Multiple   0    Live Births   1                 Past Medical History:   Diagnosis Date    Varicella        Past Surgical History:   Procedure Laterality Date    WISDOM TOOTH EXTRACTION Bilateral        Family History   Problem Relation Age of Onset    Diabetes Maternal Grandmother     Colon cancer Paternal Grandmother     Breast cancer Paternal Grandmother     Cancer Paternal Grandmother        Social History     Socioeconomic History    Marital status: /Civil Union     Spouse name: Not on file    Number of children: Not on file    Years of education: Not on file    Highest education level: Not on file   Occupational History    Not on file   Tobacco Use    Smoking status: Never Smoker    Smokeless tobacco: Never Used   Vaping Use    Vaping Use: Never used   Substance and Sexual Activity    Alcohol use: Yes     Comment: occasional    Drug use: Never    Sexual activity: Yes     Partners: Male     Birth control/protection: Condom Male   Other Topics Concern    Not on file   Social History Narrative    Not on file     Social Determinants of Health     Financial Resource Strain:     Difficulty of Paying Living Expenses:    Food Insecurity:     Worried About Running Out of Food in the Last Year:     Ran Out of Food in the Last Year:    Transportation Needs:     Lack of Transportation (Medical):      Lack of Transportation (Non-Medical):    Physical Activity:     Days of Exercise per Week:     Minutes of Exercise per Session:    Stress:     Feeling of Stress :    Social Connections:     Frequency of Communication with Friends and Family:     Frequency of Social Gatherings with Friends and Family:     Attends Oriental orthodox Services:     Active Member of Clubs or Organizations:     Attends Club or Organization Meetings:     Marital Status:    Intimate Partner Violence:     Fear of Current or Ex-Partner:     Emotionally Abused:     Physically Abused:     Sexually Abused: Current Outpatient Medications:     Prenatal Vit-DSS-Fe Cbn-FA (PRENATAL AD PO), Take by mouth, Disp: , Rfl:     Allergies   Allergen Reactions    Ceclor [Cefaclor] Other (See Comments)     Childhood reaction       Objective   Vitals:    07/22/21 0951   BP: 118/70   BP Location: Right arm   Patient Position: Sitting   Cuff Size: Standard   Weight: 74 4 kg (164 lb)     Physical Exam  Vitals and nursing note reviewed  Constitutional:       Appearance: She is well-developed  HENT:      Head: Normocephalic  Neck:      Thyroid: No thyromegaly  Trachea: No tracheal deviation  Cardiovascular:      Rate and Rhythm: Normal rate and regular rhythm  Heart sounds: Normal heart sounds  Pulmonary:      Effort: Pulmonary effort is normal       Breath sounds: Normal breath sounds  Chest:      Breasts: Breasts are symmetrical          Right: No inverted nipple, mass, nipple discharge, skin change or tenderness  Left: No inverted nipple, mass, nipple discharge, skin change or tenderness  Abdominal:      General: Bowel sounds are normal  There is no distension  Palpations: Abdomen is soft  There is no mass  Tenderness: There is no abdominal tenderness  There is no guarding or rebound  Genitourinary:     Labia:         Right: No rash, tenderness, lesion or injury  Left: No rash, tenderness, lesion or injury  Vagina: Normal       Cervix: Normal       Uterus: Normal        Adnexa: Right adnexa normal and left adnexa normal         Right: No mass, tenderness or fullness  Left: No mass, tenderness or fullness  Musculoskeletal:         General: Normal range of motion  Cervical back: Normal range of motion and neck supple  Skin:     General: Skin is warm and dry  Neurological:      Mental Status: She is alert and oriented to person, place, and time  Psychiatric:         Behavior: Behavior normal          Thought Content:  Thought content normal  Judgment: Judgment normal

## 2021-08-06 ENCOUNTER — TELEPHONE (OUTPATIENT)
Dept: OBGYN CLINIC | Facility: CLINIC | Age: 26
End: 2021-08-06

## 2021-08-06 NOTE — LETTER
August 6, 2021     Patient: Ashley Simons   YOB: 1995   Date of Visit: 8/6/2021       To Whom It May Concern: It is my medical opinion that Ashley Simons may return to full duty immediately with no restrictions on 08/09/2021  If you have any questions or concerns, please don't hesitate to call           Sincerely,        Edwin Correa MA    CC: No Recipients

## 2021-08-06 NOTE — TELEPHONE ENCOUNTER
T/c from patient requesting a note to return to work on 08/09/2021 as she hascompleted her bonding time letter writtens ent to patient via [phone]

## 2022-04-13 ENCOUNTER — TELEPHONE (OUTPATIENT)
Dept: OBGYN CLINIC | Facility: CLINIC | Age: 27
End: 2022-04-13

## 2022-05-18 ENCOUNTER — ULTRASOUND (OUTPATIENT)
Dept: OBGYN CLINIC | Facility: CLINIC | Age: 27
End: 2022-05-18

## 2022-05-18 VITALS — SYSTOLIC BLOOD PRESSURE: 120 MMHG | WEIGHT: 142 LBS | DIASTOLIC BLOOD PRESSURE: 74 MMHG | BODY MASS INDEX: 25.15 KG/M2

## 2022-05-18 DIAGNOSIS — O09.891 HX OF PRETERM DELIVERY, CURRENTLY PREGNANT, FIRST TRIMESTER: ICD-10-CM

## 2022-05-18 DIAGNOSIS — Z34.81 PRENATAL CARE, SUBSEQUENT PREGNANCY, FIRST TRIMESTER: Primary | ICD-10-CM

## 2022-05-18 PROCEDURE — PNV: Performed by: NURSE PRACTITIONER

## 2022-05-18 PROCEDURE — 87491 CHLMYD TRACH DNA AMP PROBE: CPT | Performed by: NURSE PRACTITIONER

## 2022-05-18 PROCEDURE — 87591 N.GONORRHOEAE DNA AMP PROB: CPT | Performed by: NURSE PRACTITIONER

## 2022-05-18 NOTE — PROGRESS NOTES
INITIAL OB VISIT: Pt presents to our office after a positive home UPT  Medical History: Varicella as child  Denies any hx of MRSA  Surgical History: [place] teeth    Medications: Prenatal with DHA    Social History: Denies any alcohol, smoking, or drug use in pregnancy  Does NOT have cats  Has not been outside the country in the last 6 months  OB/GYN History: Menses monthly, regular LMP: 3/6/2022  G 2 P 1  2021  34w5d F PPROM [facility]      TAUS: Franc[place] IUP at 10w5d based on CRL 3 76cm (+) FHR 166bpm S=D EMMANUEL: 2022 based on LMP    Denies any vomiting HA, Cramping, VB, LOF, Edema, Domestic Violence, Smoking  No FM yet  Tolerating PNV  Some nausea  Consent and forms signed  Sequential screen reviewed  Pap deferred  GC/CT done  Pregnancy essentials guide reviewed online  Plans on breastfeeding  Rx for initial prenatal labs given  Referral for MFM placed due to hx of  delivery  RTO 4 weeks or sooner as needed

## 2022-05-20 LAB
C TRACH DNA SPEC QL NAA+PROBE: NEGATIVE
N GONORRHOEA DNA SPEC QL NAA+PROBE: NEGATIVE

## 2022-06-03 ENCOUNTER — ROUTINE PRENATAL (OUTPATIENT)
Dept: PERINATAL CARE | Facility: OTHER | Age: 27
End: 2022-06-03
Payer: COMMERCIAL

## 2022-06-03 VITALS
WEIGHT: 143 LBS | BODY MASS INDEX: 25.34 KG/M2 | DIASTOLIC BLOOD PRESSURE: 73 MMHG | HEART RATE: 92 BPM | SYSTOLIC BLOOD PRESSURE: 118 MMHG | HEIGHT: 63 IN

## 2022-06-03 DIAGNOSIS — Z3A.12 12 WEEKS GESTATION OF PREGNANCY: Primary | ICD-10-CM

## 2022-06-03 DIAGNOSIS — O09.891 HX OF PRETERM DELIVERY, CURRENTLY PREGNANT, FIRST TRIMESTER: ICD-10-CM

## 2022-06-03 DIAGNOSIS — Z34.81 PRENATAL CARE, SUBSEQUENT PREGNANCY, FIRST TRIMESTER: ICD-10-CM

## 2022-06-03 DIAGNOSIS — O36.80X0 ENCOUNTER TO DETERMINE FETAL VIABILITY OF PREGNANCY, SINGLE OR UNSPECIFIED FETUS: ICD-10-CM

## 2022-06-03 DIAGNOSIS — Z36.82 ENCOUNTER FOR NUCHAL TRANSLUCENCY TESTING: ICD-10-CM

## 2022-06-03 PROCEDURE — 76801 OB US < 14 WKS SINGLE FETUS: CPT | Performed by: OBSTETRICS & GYNECOLOGY

## 2022-06-03 PROCEDURE — 76813 OB US NUCHAL MEAS 1 GEST: CPT | Performed by: OBSTETRICS & GYNECOLOGY

## 2022-06-03 PROCEDURE — 99241 PR OFFICE CONSULTATION NEW/ESTAB PATIENT 15 MIN: CPT | Performed by: OBSTETRICS & GYNECOLOGY

## 2022-06-03 NOTE — LETTER
Conchita 3, 2022     Karo Calvillo, [address] [facility]    Patient: Nuha Nino   YOB: 1995   Date of Visit: 6/3/2022       Dear Dr Nicanor Sow: Thank you for referring Nuha Nino to me for evaluation  Below are my notes for this consultation  If you have questions, please do not hesitate to call me  I look forward to following your patient along with you  Sincerely,        Iraj Tafoya MD        CC: No Recipients  Iraj Tafoya MD  6/3/2022  2:14 PM  Sign when Signing Visit  CONSULT NOTE    Karo Calvillo, YULIYA  [address] [address]  [facility]     Thank you for referring your Nuha Nino for a Maternal-Fetal Medicine Consultation:  Below is my consultation  Thank you very much for requesting a consultation this very nice patient for the indication of a genetic screening ultrasound and to discuss her obstetrical history  She is well known to me from her prior pregnancy which was complicated by a spontaneous  birth at 29 and half weeks  She has no other significant contributory medical, surgical, substance use, or family history  A review of systems is otherwise negative  We discussed the options for genetic screening, including but not limited to first trimester screening, second trimester screening, combined first and second trimester screening, noninvasive prenatal testing (NIPT) for patients at high risk and diagnostic screening through the use of CVS and amniocentesis  We discussed the risks and benefits of each approach including the sensitivities and false positive rates as well as the difference between a screening test and a diagnostic test   At the conclusion of our discussion the patient declined maternal serum screening to delineate her risk for fetal aneuploidy  The strongest predictor of  birth (PTB) is a prior spontaneous  birth (sPTB)    Spontaneous  birth (sPTB) recurs in 28 to 48 % of pregnancies, and tends to recur at similar gestational ages  A recent meta analysis, "Evaluating Progestogens for Preventing  birth [facility]):  meta-analysis of individual participant data from randomized clinical trials", was published in the [employer] on 2021  The results showed that compared with those who received no treatment, women with carr pregnancies at high risk for  birth (PTB) due to prior spontaneous  birth who received 17 P or vaginal progesterone were less likely to deliver before 35 weeks of gestation  The study authors concluded that there was a benefit to both 17 P and vaginal progesterone in reducing the risk of  birth, though the certainty regarding the benefit, both maternal and , is greatest for vaginal progesterone  I discussed my recommendation for use of vaginal progesterone beginning at 16 weeks gestation, which was ordered today by me  200mg vaginal suppositories x1 nightly at bedtime  Serial transvaginal ultrasound studies are recommended between 16 and 23 completed weeks gestation  Cervical cerclage is recommended with cervical shortening to less than 25 mm prior to 24 weeks gestation and after further evaluation and counseling  We discussed follow-up in detail and I recommend an anatomy ultrasound be scheduled for cervical length screening starting at 16 weeks  Thank you very much for allowing us to participate in the care of this very nice patient  Should you have any questions, please do not hesitate to contact our office  Please note, in addition to the time spent discussing the results of the ultrasound, I spent approximately 15 minutes of face-to-face time with the patient, greater than 50% of which was spent in counseling and the coordination of care for this patient  Portions of the record may have been created with voice recognition software  Occasional wrong word or "sound a like" substitutions may have occurred due to the inherent limitations of voice recognition software  Read the chart carefully and recognize, using context, where substitutions have occurred  Dylan Johnson MD  Attending Physician, [place]

## 2022-06-03 NOTE — PROGRESS NOTES
CONSULT NOTE    YULIYA Kitchen  [address]  [place],  José [place] [address]     Thank you for referring your Rita Bare for a Maternal-Fetal Medicine Consultation:  Below is my consultation  Thank you very much for requesting a consultation this very nice patient for the indication of a genetic screening ultrasound and to discuss her obstetrical history  She is well known to me from her prior pregnancy which was complicated by a spontaneous  birth at 29 and half weeks  She has no other significant contributory medical, surgical, substance use, or family history  A review of systems is otherwise negative  We discussed the options for genetic screening, including but not limited to first trimester screening, second trimester screening, combined first and second trimester screening, noninvasive prenatal testing (NIPT) for patients at high risk and diagnostic screening through the use of CVS and amniocentesis  We discussed the risks and benefits of each approach including the sensitivities and false positive rates as well as the difference between a screening test and a diagnostic test   At the conclusion of our discussion the patient declined maternal serum screening to delineate her risk for fetal aneuploidy  The strongest predictor of  birth (PTB) is a prior spontaneous  birth (sPTB)  Spontaneous  birth (sPTB) recurs in 28 to 50 % of pregnancies, and tends to recur at similar gestational ages  A recent meta analysis, "Evaluating Progestogens for Preventing  birth International [facility]):  meta-analysis of individual participant data from randomized clinical trials", was published in the [employer] on 2021   The results showed that compared with those who received no treatment, women with carr pregnancies at high risk for  birth (PTB) due to prior spontaneous  birth who received 17 P or vaginal progesterone were less likely to deliver before 35 weeks of gestation  The study authors concluded that there was a benefit to both 17 P and vaginal progesterone in reducing the risk of  birth, though the certainty regarding the benefit, both maternal and , is greatest for vaginal progesterone  I discussed my recommendation for use of vaginal progesterone beginning at 16 weeks gestation, which was ordered today by me  200mg vaginal suppositories x1 nightly at bedtime  Serial transvaginal ultrasound studies are recommended between 16 and 23 completed weeks gestation  Cervical cerclage is recommended with cervical shortening to less than 25 mm prior to 24 weeks gestation and after further evaluation and counseling  We discussed follow-up in detail and I recommend an anatomy ultrasound be scheduled for cervical length screening starting at 16 weeks  Thank you very much for allowing us to participate in the care of this very nice patient  Should you have any questions, please do not hesitate to contact our office  Please note, in addition to the time spent discussing the results of the ultrasound, I spent approximately 15 minutes of face-to-face time with the patient, greater than 50% of which was spent in counseling and the coordination of care for this patient  Portions of the record may have been created with voice recognition software  Occasional wrong word or "sound a like" substitutions may have occurred due to the inherent limitations of voice recognition software  Read the chart carefully and recognize, using context, where substitutions have occurred  Dylan Jasso MD  Attending Physician, [place]

## 2022-06-11 LAB
ABO GROUP BLD: NORMAL
APPEARANCE UR: CLEAR
BACTERIA URNS QL MICRO: NORMAL
BASOPHILS # BLD AUTO: 0 X10E3/UL (ref 0–0.2)
BASOPHILS NFR BLD AUTO: 0 %
BILIRUB UR QL STRIP: NEGATIVE
BLD GP AB SCN SERPL QL: NEGATIVE
CASTS URNS QL MICRO: NORMAL /LPF
COLOR UR: YELLOW
EOSINOPHIL # BLD AUTO: 0.1 X10E3/UL (ref 0–0.4)
EOSINOPHIL NFR BLD AUTO: 1 %
EPI CELLS #/AREA URNS HPF: NORMAL /HPF (ref 0–10)
ERYTHROCYTE [DISTWIDTH] IN BLOOD BY AUTOMATED COUNT: 12.5 % (ref 11.7–15.4)
GLUCOSE UR QL: NEGATIVE
HBV SURFACE AG SERPL QL IA: NEGATIVE
HCT VFR BLD AUTO: 35.7 % (ref 34–46.6)
HCV AB S/CO SERPL IA: 0.1 S/CO RATIO (ref 0–0.9)
HGB BLD-MCNC: 12.6 G/DL (ref 11.1–15.9)
HGB UR QL STRIP: NEGATIVE
HIV 1+2 AB+HIV1 P24 AG SERPL QL IA: NON REACTIVE
IMM GRANULOCYTES # BLD: 0 X10E3/UL (ref 0–0.1)
IMM GRANULOCYTES NFR BLD: 0 %
KETONES UR QL STRIP: ABNORMAL
LEUKOCYTE ESTERASE UR QL STRIP: NEGATIVE
LYMPHOCYTES # BLD AUTO: 1 X10E3/UL (ref 0.7–3.1)
LYMPHOCYTES NFR BLD AUTO: 17 %
MCH RBC QN AUTO: 32.6 PG (ref 26.6–33)
MCHC RBC AUTO-ENTMCNC: 35.3 G/DL (ref 31.5–35.7)
MCV RBC AUTO: 92 FL (ref 79–97)
MICRO URNS: ABNORMAL
MICRO URNS: ABNORMAL
MONOCYTES # BLD AUTO: 0.3 X10E3/UL (ref 0.1–0.9)
MONOCYTES NFR BLD AUTO: 5 %
NEUTROPHILS # BLD AUTO: 4.8 X10E3/UL (ref 1.4–7)
NEUTROPHILS NFR BLD AUTO: 77 %
NITRITE UR QL STRIP: NEGATIVE
PH UR STRIP: 6.5 [PH] (ref 5–7.5)
PLATELET # BLD AUTO: 221 X10E3/UL (ref 150–450)
PROT UR QL STRIP: NEGATIVE
RBC # BLD AUTO: 3.87 X10E6/UL (ref 3.77–5.28)
RBC #/AREA URNS HPF: NORMAL /HPF (ref 0–2)
RH BLD: POSITIVE
RPR SER QL: NON REACTIVE
RUBV IGG SERPL IA-ACNC: 7.32 INDEX
SL AMB URINALYSIS REFLEX: ABNORMAL
SP GR UR: 1.02 (ref 1–1.03)
UROBILINOGEN UR STRIP-ACNC: 0.2 MG/DL (ref 0.2–1)
WBC # BLD AUTO: 6.2 X10E3/UL (ref 3.4–10.8)
WBC #/AREA URNS HPF: NORMAL /HPF (ref 0–5)

## 2022-06-21 ENCOUNTER — ROUTINE PRENATAL (OUTPATIENT)
Dept: OBGYN CLINIC | Facility: CLINIC | Age: 27
End: 2022-06-21

## 2022-06-21 VITALS — DIASTOLIC BLOOD PRESSURE: 60 MMHG | SYSTOLIC BLOOD PRESSURE: 120 MMHG | WEIGHT: 144 LBS | BODY MASS INDEX: 25.51 KG/M2

## 2022-06-21 DIAGNOSIS — Z34.92 SECOND TRIMESTER FETUS: Primary | ICD-10-CM

## 2022-06-21 PROCEDURE — PNV: Performed by: PHYSICIAN ASSISTANT

## 2022-06-21 NOTE — PROGRESS NOTES
Pt feels well  She has declined any genetic testing and does not plan to have AFP drawn  We reviewed MFM note and recommendation regarding vaginal progesterone  All questions answered  She will plan to start vaginal prog next week and plan MFM follow up in 2 weeks for cx length scan, then serial scans thereafter  No Fm yet  No n/v/ha, no edema, no smoking  Urine neg/neg   PE done today  Pap up to date and cx's done at last visit

## 2022-07-04 NOTE — PATIENT INSTRUCTIONS
Thank you for choosing us for your  care today  If you have any questions about your ultrasound or care, please do not hesitate to contact us or your primary obstetrician  Some general instructions for your pregnancy are:    Protect against coronavirus: get vaccinated - pregnant women are increased risk of severe COVID  Notify your primary care doctor if you have any symptoms  Exercise: Aim for 22 minutes per day (150 minutes per week) of regular exercise  Walking is great! Nutrition: aim for calcium-rich and iron-rich foods as well as healthy sources of protein  Learn about Preeclampsia: preeclampsia is a common, serious high blood pressure complication in pregnancy  A blood pressure of 243KMGQ (systolic or top number) or 01CLKU (diastolic or bottom number) is not normal and needs evaluation by your doctor  Aspirin is sometimes prescribed in early pregnancy to prevent preeclampsia in women with risk factors - ask your obstetrician if you should be on this medication  If you smoke, try to reduce how many cigarettes you smoke or try to quit completely  Do not vape  Other warning signs to watch out for in pregnancy or postpartum: chest pain, obstructed breathing or shortness of breath, seizures, thoughts of hurting yourself or your baby, bleeding, a painful or swollen leg, fever, or headache (see AWHONN POST-BIRTH Warning Signs campaign)  If these happen call 911  Itching is also not normal in pregnancy and if you experience this, especially over your hands and feet, potentially worse at night, notify your doctors

## 2022-07-05 ENCOUNTER — ROUTINE PRENATAL (OUTPATIENT)
Dept: PERINATAL CARE | Facility: CLINIC | Age: 27
End: 2022-07-05
Payer: COMMERCIAL

## 2022-07-05 VITALS
BODY MASS INDEX: 25.59 KG/M2 | HEIGHT: 63 IN | DIASTOLIC BLOOD PRESSURE: 75 MMHG | WEIGHT: 144.4 LBS | HEART RATE: 119 BPM | SYSTOLIC BLOOD PRESSURE: 141 MMHG

## 2022-07-05 DIAGNOSIS — Z03.75 ENCOUNTER FOR SUSPECTED CERVICAL SHORTENING RULED OUT: ICD-10-CM

## 2022-07-05 DIAGNOSIS — O09.891 HX OF PRETERM DELIVERY, CURRENTLY PREGNANT, FIRST TRIMESTER: Primary | ICD-10-CM

## 2022-07-05 PROCEDURE — 76817 TRANSVAGINAL US OBSTETRIC: CPT | Performed by: OBSTETRICS & GYNECOLOGY

## 2022-07-05 PROCEDURE — 76815 OB US LIMITED FETUS(S): CPT | Performed by: OBSTETRICS & GYNECOLOGY

## 2022-07-05 PROCEDURE — 99213 OFFICE O/P EST LOW 20 MIN: CPT | Performed by: OBSTETRICS & GYNECOLOGY

## 2022-07-05 NOTE — PROGRESS NOTES
[address]: Ms Zheng Hale was seen today for serial cervical length screening ultrasound  See ultrasound report under "OB Procedures" tab  The time spent on this established patient on the encounter date included 5 minutes previsit service time reviewing records and precharting, 5 minutes face-to-face service time counseling regarding results and coordinating care, and  4 minutes charting, totalling 14 minutes  Please don't hesitate to contact our office with any concerns or questions    Nicho Padron MD

## 2022-07-05 NOTE — PROGRESS NOTES
Ultrasound Probe Disinfection    A transvaginal ultrasound was performed  Prior to use, disinfection was performed with High Level Disinfection Process ([employer])  Probe serial number B1: [id number] was used        [facility]  07/05/22  10:03 AM

## 2022-07-15 ENCOUNTER — ROUTINE PRENATAL (OUTPATIENT)
Dept: OBGYN CLINIC | Facility: CLINIC | Age: 27
End: 2022-07-15

## 2022-07-15 VITALS
BODY MASS INDEX: 25.8 KG/M2 | DIASTOLIC BLOOD PRESSURE: 86 MMHG | HEIGHT: 63 IN | WEIGHT: 145.6 LBS | SYSTOLIC BLOOD PRESSURE: 126 MMHG

## 2022-07-15 DIAGNOSIS — Z3A.18 PREGNANCY WITH 18 COMPLETED WEEKS GESTATION: Primary | ICD-10-CM

## 2022-07-15 PROCEDURE — PNV: Performed by: OBSTETRICS & GYNECOLOGY

## 2022-07-15 NOTE — PROGRESS NOTES
Patient reports no fm, no n/v, headache, cramping, bleeding, loss of fluid, edema, dom violence, or smoking  radha pnv urine neg/neg has pnc follow up return in 4 weeks or sooner as needed

## 2022-07-18 ENCOUNTER — ROUTINE PRENATAL (OUTPATIENT)
Dept: PERINATAL CARE | Facility: OTHER | Age: 27
End: 2022-07-18
Payer: COMMERCIAL

## 2022-07-18 VITALS
WEIGHT: 145.2 LBS | SYSTOLIC BLOOD PRESSURE: 120 MMHG | DIASTOLIC BLOOD PRESSURE: 71 MMHG | HEIGHT: 63 IN | BODY MASS INDEX: 25.73 KG/M2 | HEART RATE: 83 BPM

## 2022-07-18 DIAGNOSIS — O09.892 HX OF PRETERM DELIVERY, CURRENTLY PREGNANT, SECOND TRIMESTER: ICD-10-CM

## 2022-07-18 DIAGNOSIS — Z3A.19 19 WEEKS GESTATION OF PREGNANCY: Primary | ICD-10-CM

## 2022-07-18 PROCEDURE — 99213 OFFICE O/P EST LOW 20 MIN: CPT | Performed by: OBSTETRICS & GYNECOLOGY

## 2022-07-18 PROCEDURE — 76817 TRANSVAGINAL US OBSTETRIC: CPT | Performed by: OBSTETRICS & GYNECOLOGY

## 2022-07-18 NOTE — LETTER
July 18, 2022     Amaury Cedillo, [address] [facility]    Patient: Jose Mcallister   YOB: 1995   Date of Visit: 7/18/2022       Dear Dr Robby Haro: Thank you for referring Jose Mcallister to me for evaluation  Below are my notes for this consultation  If you have questions, please do not hesitate to call me  I look forward to following your patient along with you  Sincerely,        Lucila Almeida MD        CC: No Recipients  Lucila Almeida MD  7/18/2022  4:46 PM  Sign when Signing Visit  A fetal ultrasound was completed  See Ob procedures in Epic for an interpretation and recommendations  Do not hesitate to contact us in [facility] if you have questions  Aarti Grider MD, [address] [facility]  Maternal Fetal Medicine

## 2022-07-18 NOTE — PROGRESS NOTES
Ultrasound Probe Disinfection    A transvaginal ultrasound was performed  Prior to use, disinfection was performed with High Level Disinfection Process ([employer])  Probe serial number U3: [id number] was used        Veverly [place]  07/18/22  12:52 PM

## 2022-07-18 NOTE — PROGRESS NOTES
A fetal ultrasound was completed  See Ob procedures in Epic for an interpretation and recommendations  Do not hesitate to contact us in [facility] if you have questions  Simin Franco MD, [address] [facility]  Maternal Fetal Medicine

## 2022-07-27 NOTE — PROGRESS NOTES
Patient comes from home with complaints of groin abscesses that developed yesterday while he was sitting his car for about 8 hours.   Please refer to the [facility] ultrasound report in Ob Procedures for additional information regarding today's visit

## 2022-07-28 ENCOUNTER — ROUTINE PRENATAL (OUTPATIENT)
Dept: PERINATAL CARE | Facility: OTHER | Age: 27
End: 2022-07-28
Payer: COMMERCIAL

## 2022-07-28 VITALS
HEIGHT: 63 IN | HEART RATE: 91 BPM | DIASTOLIC BLOOD PRESSURE: 70 MMHG | SYSTOLIC BLOOD PRESSURE: 134 MMHG | BODY MASS INDEX: 25.82 KG/M2 | WEIGHT: 145.72 LBS

## 2022-07-28 DIAGNOSIS — Z3A.20 20 WEEKS GESTATION OF PREGNANCY: Primary | ICD-10-CM

## 2022-07-28 DIAGNOSIS — O09.892 HX OF PRETERM DELIVERY, CURRENTLY PREGNANT, SECOND TRIMESTER: ICD-10-CM

## 2022-07-28 DIAGNOSIS — Z36.3 ENCOUNTER FOR ANTENATAL SCREENING FOR MALFORMATIONS: ICD-10-CM

## 2022-07-28 PROCEDURE — 76817 TRANSVAGINAL US OBSTETRIC: CPT | Performed by: OBSTETRICS & GYNECOLOGY

## 2022-07-28 PROCEDURE — 76805 OB US >/= 14 WKS SNGL FETUS: CPT | Performed by: OBSTETRICS & GYNECOLOGY

## 2022-07-28 PROCEDURE — 99213 OFFICE O/P EST LOW 20 MIN: CPT | Performed by: OBSTETRICS & GYNECOLOGY

## 2022-07-28 NOTE — LETTER
July 28, 2022     iRchie Vega    Patient: Rashid Burrell   YOB: 1995   Date of Visit: 7/28/2022       Dear Dr Annamaria Clinton: Thank you for referring Rashid Burrell to me for evaluation  Below are my notes for this consultation  If you have questions, please do not hesitate to call me  I look forward to following your patient along with you  Sincerely,        Bhumi Brown MD        CC: No Recipients  Bhumi Brown MD  7/27/2022  5:19 PM  Sign when Signing Visit  Please refer to the [facility] ultrasound report in Ob Procedures for additional information regarding today's visit

## 2022-07-28 NOTE — PROGRESS NOTES
Ultrasound Probe Disinfection    A transvaginal ultrasound was performed  Prior to use, disinfection was performed with High Level Disinfection Process ([employer])  Probe serial number A2: [id number] was used        Leif Fortune  07/28/22  1:58 PM

## 2022-08-09 ENCOUNTER — ROUTINE PRENATAL (OUTPATIENT)
Dept: PERINATAL CARE | Facility: OTHER | Age: 27
End: 2022-08-09
Payer: COMMERCIAL

## 2022-08-09 VITALS
BODY MASS INDEX: 26.86 KG/M2 | SYSTOLIC BLOOD PRESSURE: 119 MMHG | HEART RATE: 66 BPM | HEIGHT: 63 IN | DIASTOLIC BLOOD PRESSURE: 78 MMHG | WEIGHT: 151.6 LBS

## 2022-08-09 DIAGNOSIS — Z3A.22 22 WEEKS GESTATION OF PREGNANCY: Primary | ICD-10-CM

## 2022-08-09 DIAGNOSIS — O09.892 HX OF PRETERM DELIVERY, CURRENTLY PREGNANT, SECOND TRIMESTER: ICD-10-CM

## 2022-08-09 PROCEDURE — 76817 TRANSVAGINAL US OBSTETRIC: CPT | Performed by: OBSTETRICS & GYNECOLOGY

## 2022-08-09 PROCEDURE — 99212 OFFICE O/P EST SF 10 MIN: CPT | Performed by: OBSTETRICS & GYNECOLOGY

## 2022-08-09 NOTE — LETTER
August 10, 2022     Eugene Corea, [address] [facility]    Patient: Hilma Favre   YOB: 1995   Date of Visit: 8/9/2022       Dear Ms Cardenas: Thank you for referring Hilma Favre to me for evaluation  Below are my notes for this consultation  If you have questions, please do not hesitate to call me  I look forward to following your patient along with you  Sincerely,        Arlene Morris MD        CC: No Recipients  Arlene Morris MD  8/10/2022  9:41 AM  Sign when Signing Visit  A fetal ultrasound was completed  See Ob procedures in Epic for an interpretation and recommendations  Do not hesitate to contact us in [facility] if you have questions  Carlo Love MD, MSCE  Maternal Fetal Medicine      Juliet Higgins  8/9/2022 12:00 PM  Sign when Signing Visit  Ultrasound Probe Disinfection    A transvaginal ultrasound was performed  Prior to use, disinfection was performed with High Level Disinfection Process ([employer])  Probe serial number M3: [id number] was used        Juliet Higgins  08/09/22  12:00 PM

## 2022-08-09 NOTE — PROGRESS NOTES
Ultrasound Probe Disinfection    A transvaginal ultrasound was performed  Prior to use, disinfection was performed with High Level Disinfection Process ([employer])  Probe serial number M3: [id number] was used        Juliet Higgins  08/09/22  12:00 PM

## 2022-08-10 PROBLEM — Z3A.22 22 WEEKS GESTATION OF PREGNANCY: Status: ACTIVE | Noted: 2022-06-03

## 2022-08-10 NOTE — PROGRESS NOTES
A fetal ultrasound was completed  See Ob procedures in Epic for an interpretation and recommendations  Do not hesitate to contact us in [facility] if you have questions  Bridget Nash MD, [address] [facility]  Maternal Fetal Medicine Suture Removal: 7 days

## 2022-08-11 ENCOUNTER — ROUTINE PRENATAL (OUTPATIENT)
Dept: OBGYN CLINIC | Facility: CLINIC | Age: 27
End: 2022-08-11

## 2022-08-11 VITALS — SYSTOLIC BLOOD PRESSURE: 114 MMHG | BODY MASS INDEX: 26.68 KG/M2 | WEIGHT: 150.6 LBS | DIASTOLIC BLOOD PRESSURE: 80 MMHG

## 2022-08-11 DIAGNOSIS — Z3A.22 PREGNANCY WITH 22 COMPLETED WEEKS GESTATION: Primary | ICD-10-CM

## 2022-08-11 PROCEDURE — PNV: Performed by: OBSTETRICS & GYNECOLOGY

## 2022-08-11 NOTE — PROGRESS NOTES
Patient reports good fm, no n/v, headache, cramping, bleeding, loss of fluid, edema, dom violence, or smoking  radha pnv urine neg/neg patient gets a little vaginal irritation with urination and intercourse from progesterone suppository  Discussed trying rinse bottle, Desitin or A&D, and possibly coconut oil lubrication  Has continued follow-up with  Center    Return in 4 weeks or sooner as needed Burow's Advancement Flap Text: The defect edges were debeveled with a #15 scalpel blade.  Given the location of the defect and the proximity to free margins a Burow's advancement flap was deemed most appropriate.  Using a sterile surgical marker, the appropriate advancement flap was drawn incorporating the defect and placing the expected incisions within the relaxed skin tension lines where possible.    The area thus outlined was incised deep to adipose tissue with a #15 scalpel blade.  The skin margins were undermined to an appropriate distance in all directions utilizing iris scissors.

## 2022-08-23 ENCOUNTER — TELEPHONE (OUTPATIENT)
Dept: OBGYN CLINIC | Facility: CLINIC | Age: 27
End: 2022-08-23

## 2022-08-23 DIAGNOSIS — O09.891 HX OF PRETERM DELIVERY, CURRENTLY PREGNANT, FIRST TRIMESTER: ICD-10-CM

## 2022-08-23 NOTE — TELEPHONE ENCOUNTER
Interval History: Had an episode of urinary retention yesterday afternoon and was straight cathed. Voidied spontaneously with no issues overnight. Stable on room air. Pain well controlled. Tolerating diet. Ambulating and OOBTC.    Medications:  Continuous Infusions:  Scheduled Meds:   albuterol-ipratropium  3 mL Nebulization Q6H    atorvastatin  40 mg Oral QHS    enoxaparin  40 mg Subcutaneous Q24H    levothyroxine  88 mcg Oral Before breakfast    pantoprazole  40 mg Oral Before breakfast    polyethylene glycol  17 g Oral Daily    senna-docusate 8.6-50 mg  1 tablet Oral BID    tamsulosin  0.4 mg Oral Daily     PRN Meds:acetaminophen, bisacodyl, HYDROcodone-acetaminophen, HYDROcodone-acetaminophen, lactulose, metoclopramide HCl, ondansetron     Review of patient's allergies indicates:  No Known Allergies  Objective:     Vital Signs (Most Recent):  Temp: 98.5 °F (36.9 °C) (01/12/19 0500)  Pulse: 103 (01/12/19 0710)  Resp: 18 (01/12/19 0710)  BP: (!) 125/55 (01/12/19 0500)  SpO2: 96 % (01/12/19 0710) Vital Signs (24h Range):  Temp:  [97.8 °F (36.6 °C)-99.8 °F (37.7 °C)] 98.5 °F (36.9 °C)  Pulse:  [] 103  Resp:  [16-20] 18  SpO2:  [94 %-97 %] 96 %  BP: (110-139)/(53-67) 125/55     Intake/Output - Last 3 Shifts       01/10 0700 - 01/11 0659 01/11 0700 - 01/12 0659 01/12 0700 - 01/13 0659    P.O. 620 1460     I.V. (mL/kg) 800 (8.7)      Total Intake(mL/kg) 1420 (15.5) 1460 (15.9)     Urine (mL/kg/hr) 300 750 (0.3)     Emesis/NG output 0      Stool 0 0     Chest Tube 330 70     Total Output 630 820     Net +790 +640            Urine Occurrence  2 x     Stool Occurrence 0 x 0 x           SpO2: 96 %  O2 Device (Oxygen Therapy): room air    Physical Exam   Constitutional: She is oriented to person, place, and time. She appears well-developed and well-nourished.   HENT:   Head: Normocephalic.   Eyes: Pupils are equal, round, and reactive to light.   Neck: Normal range of motion. No tracheal deviation present.  T/c from patient states she is need of having a route canal on 08/31/2022 and needs dental clearance , letter written and sent via my chart    BRIGHT CORREA   Cardiovascular: Normal rate, regular rhythm, normal heart sounds and intact distal pulses.   Pulmonary/Chest: Effort normal and breath sounds normal. She has no wheezes.   Left chest tube and PleurX site c/d/i. SS output. No airleaks.   Abdominal: Soft. Bowel sounds are normal. She exhibits no distension. There is no tenderness.   Musculoskeletal: She exhibits no edema.   Neurological: She is alert and oriented to person, place, and time.   Skin: Skin is warm.   Psychiatric: She has a normal mood and affect.       Significant Labs:  BMP:   No results for input(s): GLU, NA, K, CL, CO2, BUN, CREATININE, CALCIUM, MG in the last 48 hours.  CBC: No results for input(s): WBC, RBC, HGB, HCT, PLT, MCV, MCH, MCHC in the last 48 hours.  CMP:   No results for input(s): GLU, CALCIUM, ALBUMIN, PROT, NA, K, CO2, CL, BUN, CREATININE, ALKPHOS, ALT, AST, BILITOT in the last 48 hours.    Significant Diagnostics:  CXR: I have reviewed all pertinent results/findings within the past 24 hours    VTE Risk Mitigation (From admission, onward)        Ordered     enoxaparin injection 40 mg  Every 24 hours (non-standard times)      01/10/19 1447     IP VTE HIGH RISK PATIENT  Once      01/10/19 1447     Place BERTIN hose  Until discontinued      01/10/19 0904     Place sequential compression device  Until discontinued      01/10/19 0904

## 2022-08-23 NOTE — LETTER
[address] [facility] [id number]         08/23/2022        Is under our care for pregnancy  Patient is recommended to receive regular dental cleanings in pregnancy  Should she REQUIRE xrays , we request that she be double shielded  If she requires Novacaine we ask that she have NO epinephrine  If patient should require sedation, Diprivan is recommended  Antibiotics in the Penicillin family are fine, and if a pain medications is needed, Tylenol with Codeine is safe in pregnancy       Sincerely,    [facility], OB-Gyn      ------------------------------------------------------------------------------------------------------------

## 2022-09-09 ENCOUNTER — ROUTINE PRENATAL (OUTPATIENT)
Dept: OBGYN CLINIC | Facility: CLINIC | Age: 27
End: 2022-09-09

## 2022-09-09 VITALS — DIASTOLIC BLOOD PRESSURE: 68 MMHG | SYSTOLIC BLOOD PRESSURE: 110 MMHG | WEIGHT: 152 LBS | BODY MASS INDEX: 26.93 KG/M2

## 2022-09-09 DIAGNOSIS — Z34.82 PRENATAL CARE, SUBSEQUENT PREGNANCY, SECOND TRIMESTER: Primary | ICD-10-CM

## 2022-09-09 PROCEDURE — PNV: Performed by: NURSE PRACTITIONER

## 2022-09-09 NOTE — PROGRESS NOTES
OFFICE VISIT: Denies any N/V, HA, Cramping, VB, LOF, Edema, Domestic Violence, Smoking  + FM  Tolerating PNV  Has follow up with MFM  Continues with vaginal progesterone  Script for third trimester labs given  Urine neg/neg  RTO 4 weeks or sooner as needed

## 2022-09-23 ENCOUNTER — ROUTINE PRENATAL (OUTPATIENT)
Dept: OBGYN CLINIC | Facility: CLINIC | Age: 27
End: 2022-09-23

## 2022-09-23 VITALS — DIASTOLIC BLOOD PRESSURE: 72 MMHG | SYSTOLIC BLOOD PRESSURE: 120 MMHG | WEIGHT: 168.8 LBS | BODY MASS INDEX: 29.9 KG/M2

## 2022-09-23 DIAGNOSIS — O09.891 HX OF PRETERM DELIVERY, CURRENTLY PREGNANT, FIRST TRIMESTER: ICD-10-CM

## 2022-09-23 DIAGNOSIS — Z34.83 PRENATAL CARE, SUBSEQUENT PREGNANCY, THIRD TRIMESTER: Primary | ICD-10-CM

## 2022-09-23 LAB
DME PARACHUTE DELIVERY DATE REQUESTED: NORMAL
DME PARACHUTE ITEM DESCRIPTION: NORMAL
DME PARACHUTE ORDER STATUS: NORMAL
DME PARACHUTE SUPPLIER NAME: NORMAL
DME PARACHUTE SUPPLIER PHONE: NORMAL

## 2022-09-23 PROCEDURE — PNV: Performed by: NURSE PRACTITIONER

## 2022-09-23 NOTE — PROGRESS NOTES
OFFICE VISIT; Denies any N/V, HA, Cramping, VB, LOF, Edema, Domestic Violence, Smoking  + FM  Tolerating PNV  30 wk folder given and reviewed  Breast pump ordered  Declined TDap today  Has growth scan scheduled with MFM  Urine neg/neg  RTO 2 weeks or sooner as needed

## 2022-09-26 DIAGNOSIS — O09.891 HX OF PRETERM DELIVERY, CURRENTLY PREGNANT, FIRST TRIMESTER: ICD-10-CM

## 2022-10-01 LAB
BASOPHILS # BLD AUTO: 0 X10E3/UL (ref 0–0.2)
BASOPHILS NFR BLD AUTO: 0 %
EOSINOPHIL # BLD AUTO: 0.1 X10E3/UL (ref 0–0.4)
EOSINOPHIL NFR BLD AUTO: 1 %
ERYTHROCYTE [DISTWIDTH] IN BLOOD BY AUTOMATED COUNT: 13 % (ref 11.7–15.4)
GLUCOSE 1H P 50 G GLC PO SERPL-MCNC: 118 MG/DL (ref 65–139)
HCT VFR BLD AUTO: 38.7 % (ref 34–46.6)
HGB BLD-MCNC: 13.3 G/DL (ref 11.1–15.9)
IMM GRANULOCYTES # BLD: 0 X10E3/UL (ref 0–0.1)
IMM GRANULOCYTES NFR BLD: 0 %
LYMPHOCYTES # BLD AUTO: 1 X10E3/UL (ref 0.7–3.1)
LYMPHOCYTES NFR BLD AUTO: 12 %
MCH RBC QN AUTO: 33.9 PG (ref 26.6–33)
MCHC RBC AUTO-ENTMCNC: 34.4 G/DL (ref 31.5–35.7)
MCV RBC AUTO: 99 FL (ref 79–97)
MONOCYTES # BLD AUTO: 0.4 X10E3/UL (ref 0.1–0.9)
MONOCYTES NFR BLD AUTO: 5 %
NEUTROPHILS # BLD AUTO: 7.1 X10E3/UL (ref 1.4–7)
NEUTROPHILS NFR BLD AUTO: 82 %
PLATELET # BLD AUTO: 201 X10E3/UL (ref 150–450)
RBC # BLD AUTO: 3.92 X10E6/UL (ref 3.77–5.28)
RPR SER QL: NON REACTIVE
WBC # BLD AUTO: 8.6 X10E3/UL (ref 3.4–10.8)

## 2022-10-04 NOTE — PROGRESS NOTES
OB/GYN  PN Visit  Romeo Lopez  [id number]  10/7/2022  12:40 PM  Dr Flaquito Stroud    S: 32 y o  Rolando Soriano 30w5d here for PN visit  She denies contractions  She denies leakage of fluid and vaginal bleeding  She reports good fetal movement  She denies nausea, vomiting, headache, cramping, edema, domestic violence, and smoking  Her pregnancy is complicated by history of PPROM w/ PTD      O:  Vitals:    10/07/22 0907   BP: 120/78     Physical Exam  Vitals reviewed  Constitutional:       General: She is not in acute distress  Appearance: Normal appearance  She is well-developed  She is not ill-appearing, toxic-appearing or diaphoretic  Cardiovascular:      Rate and Rhythm: Normal rate  Pulmonary:      Effort: Pulmonary effort is normal    Abdominal:      General: There is no distension  Palpations: Abdomen is soft  There is no mass  Tenderness: There is no abdominal tenderness  There is no guarding or rebound  Genitourinary:     Comments: Gravid, nontender  Skin:     General: Skin is warm and dry  Neurological:      Mental Status: She is alert and oriented to person, place, and time     Psychiatric:         Mood and Affect: Mood normal          Behavior: Behavior normal        Fundal height: 30cm  FHT: 145bpm     A/P:    Problem List        Unprioritized    Hx of  delivery, currently pregnant, second trimester    Current Assessment & Plan     Currently on progesterone for reduction risk of PTD  Hx PTD @ 34wks due to PPROM           30 weeks gestation of pregnancy    Current Assessment & Plan     - Continue PNV  - Labor precautions reviewed  - Fetal kick counts reviewed  - Delivery consent signed  - Labs: UTD  - Ultrasounds: Most recent on 22 wnl; Next US scheduled for 10/20/22  - Tdap: Declined  - Flu Shot: Declined  - COVID: Declined  - Delivery:  with epidural  - Contraception: TBD  - Breastfeeding: TBD  - RTO in 2 weeks                   Future Appointments   Date Time Provider Patricia Walshi   10/20/2022  3:00 PM  [facility] [place]   10/21/2022 10:30 AM Giorgio oRbb MD [facility]   2022 11:00 AM Alveda Sandifer Elliott Bosch, CRNP [facility]   2022 10:30 AM [facility]   2022 11:00 AM Mindy Bell MD [facility]   2022 11:00 AM Rachael Manning MD [facility]   2022  9:45 AM Mindy Bell MD [address] [facility]  10/7/2022  12:40 PM

## 2022-10-07 ENCOUNTER — ROUTINE PRENATAL (OUTPATIENT)
Dept: OBGYN CLINIC | Facility: CLINIC | Age: 27
End: 2022-10-07

## 2022-10-07 VITALS — WEIGHT: 172.8 LBS | DIASTOLIC BLOOD PRESSURE: 78 MMHG | BODY MASS INDEX: 30.61 KG/M2 | SYSTOLIC BLOOD PRESSURE: 120 MMHG

## 2022-10-07 DIAGNOSIS — Z3A.30 30 WEEKS GESTATION OF PREGNANCY: Primary | ICD-10-CM

## 2022-10-07 DIAGNOSIS — O09.892 HX OF PRETERM DELIVERY, CURRENTLY PREGNANT, SECOND TRIMESTER: ICD-10-CM

## 2022-10-07 PROCEDURE — PNV: Performed by: OBSTETRICS & GYNECOLOGY

## 2022-10-07 NOTE — ASSESSMENT & PLAN NOTE
- Continue PNV  - Labor precautions reviewed  - Fetal kick counts reviewed  - Delivery consent signed  - Labs: UTD  - Ultrasounds: Most recent on 22 wnl; Next US scheduled for 10/20/22  - Tdap: Declined  - Flu Shot: Declined  - COVID: Declined  - Delivery:  with epidural  - Contraception: TBD  - Breastfeeding: TBD  - RTO in 2 weeks

## 2022-10-17 DIAGNOSIS — O09.891 HX OF PRETERM DELIVERY, CURRENTLY PREGNANT, FIRST TRIMESTER: ICD-10-CM

## 2022-10-20 ENCOUNTER — ULTRASOUND (OUTPATIENT)
Dept: PERINATAL CARE | Facility: OTHER | Age: 27
End: 2022-10-20
Payer: COMMERCIAL

## 2022-10-20 VITALS
DIASTOLIC BLOOD PRESSURE: 72 MMHG | SYSTOLIC BLOOD PRESSURE: 124 MMHG | HEIGHT: 63 IN | HEART RATE: 92 BPM | WEIGHT: 177.91 LBS | BODY MASS INDEX: 31.52 KG/M2

## 2022-10-20 DIAGNOSIS — O09.892 HX OF PRETERM DELIVERY, CURRENTLY PREGNANT, SECOND TRIMESTER: Primary | ICD-10-CM

## 2022-10-20 DIAGNOSIS — Z3A.32 32 WEEKS GESTATION OF PREGNANCY: ICD-10-CM

## 2022-10-20 DIAGNOSIS — Z36.89 ENCOUNTER FOR ULTRASOUND TO CHECK FETAL GROWTH: ICD-10-CM

## 2022-10-20 PROCEDURE — 76816 OB US FOLLOW-UP PER FETUS: CPT | Performed by: OBSTETRICS & GYNECOLOGY

## 2022-10-20 PROCEDURE — 99212 OFFICE O/P EST SF 10 MIN: CPT | Performed by: OBSTETRICS & GYNECOLOGY

## 2022-10-20 NOTE — PATIENT INSTRUCTIONS
Thank you for choosing us for your  care today  If you have any questions about your ultrasound or care, please do not hesitate to contact us or your primary obstetrician  Some general instructions for your pregnancy are:    Protect against coronavirus: get vaccinated - pregnant women are increased risk of severe COVID  Notify your primary care doctor if you have any symptoms  Exercise: Aim for 22 minutes per day (150 minutes per week) of regular exercise  Walking is great! Nutrition: aim for calcium-rich and iron-rich foods as well as healthy sources of protein  Learn about Preeclampsia: preeclampsia is a common, serious high blood pressure complication in pregnancy  A blood pressure of 562RGQL (systolic or top number) or 81QOKS (diastolic or bottom number) is not normal and needs evaluation by your doctor  Aspirin is sometimes prescribed in early pregnancy to prevent preeclampsia in women with risk factors - ask your obstetrician if you should be on this medication  If you smoke, try to reduce how many cigarettes you smoke or try to quit completely  Do not vape  Other warning signs to watch out for in pregnancy or postpartum: chest pain, obstructed breathing or shortness of breath, seizures, thoughts of hurting yourself or your baby, bleeding, a painful or swollen leg, fever, or headache (see AWHONN POST-BIRTH Warning Signs campaign)  If these happen call 911  Itching is also not normal in pregnancy and if you experience this, especially over your hands and feet, potentially worse at night, notify your doctors

## 2022-10-20 NOTE — PROGRESS NOTES
[address]: Ms Zheng Hale was seen today for fetal growth assessment ultrasound  See ultrasound report under "OB Procedures" tab  The time spent on this established patient on the encounter date included 3 minutes previsit service time reviewing records and precharting, 3 minutes face-to-face service time counseling regarding results and coordinating care, and  4 minutes charting, totalling 10 minutes    Please don't hesitate to contact our office with any concerns or questions   -Valentina Pro

## 2022-10-21 ENCOUNTER — ROUTINE PRENATAL (OUTPATIENT)
Dept: OBGYN CLINIC | Facility: CLINIC | Age: 27
End: 2022-10-21

## 2022-10-21 VITALS — WEIGHT: 177 LBS | SYSTOLIC BLOOD PRESSURE: 102 MMHG | BODY MASS INDEX: 31.35 KG/M2 | DIASTOLIC BLOOD PRESSURE: 70 MMHG

## 2022-10-21 DIAGNOSIS — Z34.93 PRENATAL CARE IN THIRD TRIMESTER: Primary | ICD-10-CM

## 2022-10-21 DIAGNOSIS — Z3A.32 32 WEEKS GESTATION OF PREGNANCY: ICD-10-CM

## 2022-10-21 LAB
DME PARACHUTE DELIVERY DATE EXPECTED: NORMAL
DME PARACHUTE DELIVERY DATE REQUESTED: NORMAL
DME PARACHUTE ITEM DESCRIPTION: NORMAL
DME PARACHUTE ORDER STATUS: NORMAL
DME PARACHUTE SUPPLIER NAME: NORMAL
DME PARACHUTE SUPPLIER PHONE: NORMAL

## 2022-10-21 PROCEDURE — PNV: Performed by: STUDENT IN AN ORGANIZED HEALTH CARE EDUCATION/TRAINING PROGRAM

## 2022-10-21 NOTE — PATIENT INSTRUCTIONS
Pregnancy at 31 to 34 Weeks   AMBULATORY CARE:   Changes happening with your body: You may continue to have symptoms such as shortness of breath, heartburn, contractions, or swelling of your ankles and feet  You may be gaining about 1 pound a week now  Seek care immediately if:   You develop a severe headache that does not go away  You have new or increased vision changes, such as blurred or spotted vision  You have new or increased swelling in your face or hands  You have vaginal spotting or bleeding  Your water broke or you feel warm water gushing or trickling from your vagina  Call your obstetrician if:   You have more than 5 contractions in 1 hour  You notice any changes in your baby's movements  You have abdominal cramps, pressure, or tightening  You have a change in vaginal discharge  You have chills or a fever  You have vaginal itching, burning, or pain  You have yellow, green, white, or foul-smelling vaginal discharge  You have pain or burning when you urinate, less urine than usual, or pink or bloody urine  You have questions or concerns about your condition or care  How to care for yourself at this stage of your pregnancy:       Eat a variety of healthy foods  Healthy foods include fruits, vegetables, whole-grain breads, low-fat dairy foods, beans, lean meats, and fish  Drink liquids as directed  Ask how much liquid to drink each day and which liquids are best for you  Limit caffeine to less than 200 milligrams each day  Limit your intake of fish to 2 servings each week  Choose fish low in mercury such as canned light tuna, shrimp, salmon, cod, or tilapia  Do not  eat fish high in mercury such as swordfish, tilefish, rohit mackerel, and shark  Manage heartburn  by eating 4 or 5 small meals each day instead of large meals  Avoid spicy food  Manage swelling  by lying down and putting your feet up  Take prenatal vitamins as directed    Your need for certain vitamins and minerals, such as folic acid, increases during pregnancy  Prenatal vitamins provide some of the extra vitamins and minerals you need  Prenatal vitamins may also help to decrease the risk of certain birth defects  Talk to your healthcare provider about exercise  Moderate exercise can help you stay fit  Your healthcare provider will help you plan an exercise program that is safe for you during pregnancy  Do not smoke  Smoking increases your risk of a miscarriage and other health problems during your pregnancy  Smoking can cause your baby to be born too early or weigh less at birth  Ask your healthcare provider for information if you need help quitting  Do not drink alcohol  Alcohol passes from your body to your baby through the placenta  It can affect your baby's brain development and cause fetal alcohol syndrome (FAS)  FAS is a group of conditions that causes mental, behavior, and growth problems  Talk to your healthcare provider before you take any medicines  Many medicines may harm your baby if you take them when you are pregnant  Do not take any medicines, vitamins, herbs, or supplements without first talking to your healthcare provider  Never use illegal or street drugs (such as marijuana or cocaine) while you are pregnant  Safety tips during pregnancy:   Avoid hot tubs and saunas  Do not use a hot tub or sauna while you are pregnant, especially during your first trimester  Hot tubs and saunas may raise your baby's temperature and increase the risk of birth defects  Avoid toxoplasmosis  This is an infection caused by eating raw meat or being around infected cat feces  It can cause birth defects, miscarriages, and other problems  Wash your hands after you touch raw meat  Make sure any meat is well-cooked before you eat it  Avoid raw eggs and unpasteurized milk  Use gloves or ask someone else to clean your cat's litter box while you are pregnant  Changes happening with your baby:  By 34 weeks, your baby may weigh more than 5 pounds  Your baby will be about 12 ½ inches long from the top of the head to the rump (baby's bottom)  Your baby is gaining about ½ pound a week  Your baby's eyes open and close now  Your baby's kicks and movements are more forceful at this time  What you need to know about prenatal care: Your healthcare provider will check your blood pressure and weight  You may also need the following:  A urine test  may also be done to check for sugar and protein  These can be signs of gestational diabetes or infection  Protein in your urine may also be a sign of preeclampsia  Preeclampsia is a condition that can develop during week 20 or later of your pregnancy  It causes high blood pressure, and it can cause problems with your kidneys and other organs  A gestational diabetes screen  may be done  Your healthcare provider may order either a 1-step or 2-step oral glucose tolerance test (OGTT)  1-step OGTT:  Your blood sugar level will be tested after you have not eaten for 8 hours (fasting)  You will then be given a glucose drink  Your level will be tested again 1 hour and 2 hours after you finish the drink  2-step OGTT:  You do not have to fast for the first part of the test  You will have the glucose drink at any time of day  Your blood sugar level will be checked 1 hour later  If your blood sugar is higher than a certain level, another test will be ordered  You will fast and your blood sugar level will be tested  You will have the glucose drink  Your blood will be tested again 1 hour, 2 hours, and 3 hours after you finish the glucose drink  A Tdap vaccine  may be recommended by your healthcare provider  Fundal height  is a measurement of your uterus to check your baby's growth  This number is usually the same as the number of weeks that you have been pregnant   Your healthcare provider may also check your baby's position  Your baby's heart rate  will be checked  Follow up with your obstetrician as directed:  Write down your questions so you remember to ask them during your visits  © Copyright [employer] 2022 Information is for End User's use only and may not be sold, redistributed or otherwise used for commercial purposes  All illustrations and images included in CareNotes® are the copyrighted property of A D A M , Inc  or [address]   The above information is an  only  It is not intended as medical advice for individual conditions or treatments  Talk to your doctor, nurse or pharmacist before following any medical regimen to see if it is safe and effective for you  Kick Counts in Pregnancy   WHAT YOU NEED TO KNOW:   Kick counts measure how much your baby is moving in your womb  A kick from your baby can be felt as a twist, turn, swish, roll, or jab  It is common to feel your baby kicking at 26 to 28 weeks of pregnancy  You may feel your baby kick as early as 20 weeks of pregnancy  You may want to start counting at 28 weeks  DISCHARGE INSTRUCTIONS:   Contact your doctor immediately if:   You feel a change in the number of kicks or movements of your baby  You feel fewer than 10 kicks within 2 hours  You have questions or concerns about your baby's movements  Why measure kick counts:  Your baby's movement may provide information about your baby's health  He or she may move less, or not at all, if there are problems  Your baby may move less if he or she is not getting enough oxygen or nutrition from the placenta  Do not smoke while you are pregnant  Smoking decreases the amount of oxygen that gets to your baby  Talk to your healthcare provider if you need help to quit smoking  Tell your healthcare provider as soon as you feel a change in your baby's movements  When to measure kick counts:   Measure kick counts at the same time every day        Measure kick counts when your baby is awake and most active  Your baby may be most active in the evening  How to measure kick counts:  Check that your baby is awake before you measure kick counts  You can wake up your baby by lightly pushing on your belly, walking, or drinking something cold  Your healthcare provider may tell you different ways to measure kick counts  You may be told to do the following:  Use a chart or clock to keep track of the time you start and finish counting  Sit in a chair or lie on your left side  Place your hands on the largest part of your belly  Count until you reach 10 kicks  Write down how much time it takes to count 10 kicks  It may take 30 minutes to 2 hours to count 10 kicks  It should not take more than 2 hours to count 10 kicks  Follow up with your doctor as directed:  Write down your questions so you remember to ask them during your visits  © Copyright [employer] 2022 Information is for End User's use only and may not be sold, redistributed or otherwise used for commercial purposes  All illustrations and images included in CareNotes® are the copyrighted property of [employer]  or [facility] [address]   The above information is an  only  It is not intended as medical advice for individual conditions or treatments  Talk to your doctor, nurse or pharmacist before following any medical regimen to see if it is safe and effective for you

## 2022-10-21 NOTE — PROGRESS NOTES
Mamie Ruiz is a 33 yo  at [address] [facility] presenting for routine PNC- denies any CTX, cramping, LOF, discharge, VB  Endorses good FM  Urine neg/neg  Growth scan showed fetus VTX, EFW 55%ile)- no further u/s recommended  Continues on vaginal progesterone until 36 weeks for h/o PPROM and subsequent PTD  Reviewed PTL precautions    RTO in 2 weeks or sooner- consider early GBS

## 2022-11-03 ENCOUNTER — ROUTINE PRENATAL (OUTPATIENT)
Dept: OBGYN CLINIC | Facility: CLINIC | Age: 27
End: 2022-11-03

## 2022-11-03 VITALS — BODY MASS INDEX: 32.42 KG/M2 | DIASTOLIC BLOOD PRESSURE: 82 MMHG | SYSTOLIC BLOOD PRESSURE: 124 MMHG | WEIGHT: 183 LBS

## 2022-11-03 DIAGNOSIS — Z34.93 PRENATAL CARE IN THIRD TRIMESTER: Primary | ICD-10-CM

## 2022-11-03 DIAGNOSIS — Z3A.34 34 WEEKS GESTATION OF PREGNANCY: ICD-10-CM

## 2022-11-03 NOTE — PROGRESS NOTES
Salvatore Coffey is a 33 yo  at 34w4d presenting for routine PNC- denies any CTX, LOF or VB  Endorses good FM  Urine neg/neg  Continues on vaginal progesterone for h/o  delivery- given no complaints today plan on collecting GBS at next visit  RTO in 2 weeks or sooner if needed

## 2022-11-03 NOTE — PATIENT INSTRUCTIONS
Pregnancy at 31 to 34 Weeks   AMBULATORY CARE:   Changes happening with your body: You may continue to have symptoms such as shortness of breath, heartburn, contractions, or swelling of your ankles and feet  You may be gaining about 1 pound a week now  Seek care immediately if:   You develop a severe headache that does not go away  You have new or increased vision changes, such as blurred or spotted vision  You have new or increased swelling in your face or hands  You have vaginal spotting or bleeding  Your water broke or you feel warm water gushing or trickling from your vagina  Call your obstetrician if:   You have more than 5 contractions in 1 hour  You notice any changes in your baby's movements  You have abdominal cramps, pressure, or tightening  You have a change in vaginal discharge  You have chills or a fever  You have vaginal itching, burning, or pain  You have yellow, green, white, or foul-smelling vaginal discharge  You have pain or burning when you urinate, less urine than usual, or pink or bloody urine  You have questions or concerns about your condition or care  How to care for yourself at this stage of your pregnancy:       Eat a variety of healthy foods  Healthy foods include fruits, vegetables, whole-grain breads, low-fat dairy foods, beans, lean meats, and fish  Drink liquids as directed  Ask how much liquid to drink each day and which liquids are best for you  Limit caffeine to less than 200 milligrams each day  Limit your intake of fish to 2 servings each week  Choose fish low in mercury such as canned light tuna, shrimp, salmon, cod, or tilapia  Do not  eat fish high in mercury such as swordfish, tilefish, rohit mackerel, and shark  Manage heartburn  by eating 4 or 5 small meals each day instead of large meals  Avoid spicy food  Manage swelling  by lying down and putting your feet up  Take prenatal vitamins as directed    Your need for certain vitamins and minerals, such as folic acid, increases during pregnancy  Prenatal vitamins provide some of the extra vitamins and minerals you need  Prenatal vitamins may also help to decrease the risk of certain birth defects  Talk to your healthcare provider about exercise  Moderate exercise can help you stay fit  Your healthcare provider will help you plan an exercise program that is safe for you during pregnancy  Do not smoke  Smoking increases your risk of a miscarriage and other health problems during your pregnancy  Smoking can cause your baby to be born too early or weigh less at birth  Ask your healthcare provider for information if you need help quitting  Do not drink alcohol  Alcohol passes from your body to your baby through the placenta  It can affect your baby's brain development and cause fetal alcohol syndrome (FAS)  FAS is a group of conditions that causes mental, behavior, and growth problems  Talk to your healthcare provider before you take any medicines  Many medicines may harm your baby if you take them when you are pregnant  Do not take any medicines, vitamins, herbs, or supplements without first talking to your healthcare provider  Never use illegal or street drugs (such as marijuana or cocaine) while you are pregnant  Safety tips during pregnancy:   Avoid hot tubs and saunas  Do not use a hot tub or sauna while you are pregnant, especially during your first trimester  Hot tubs and saunas may raise your baby's temperature and increase the risk of birth defects  Avoid toxoplasmosis  This is an infection caused by eating raw meat or being around infected cat feces  It can cause birth defects, miscarriages, and other problems  Wash your hands after you touch raw meat  Make sure any meat is well-cooked before you eat it  Avoid raw eggs and unpasteurized milk  Use gloves or ask someone else to clean your cat's litter box while you are pregnant  Changes happening with your baby:  By 34 weeks, your baby may weigh more than 5 pounds  Your baby will be about 12 ½ inches long from the top of the head to the rump (baby's bottom)  Your baby is gaining about ½ pound a week  Your baby's eyes open and close now  Your baby's kicks and movements are more forceful at this time  What you need to know about prenatal care: Your healthcare provider will check your blood pressure and weight  You may also need the following:  A urine test  may also be done to check for sugar and protein  These can be signs of gestational diabetes or infection  Protein in your urine may also be a sign of preeclampsia  Preeclampsia is a condition that can develop during week 20 or later of your pregnancy  It causes high blood pressure, and it can cause problems with your kidneys and other organs  A gestational diabetes screen  may be done  Your healthcare provider may order either a 1-step or 2-step oral glucose tolerance test (OGTT)  1-step OGTT:  Your blood sugar level will be tested after you have not eaten for 8 hours (fasting)  You will then be given a glucose drink  Your level will be tested again 1 hour and 2 hours after you finish the drink  2-step OGTT:  You do not have to fast for the first part of the test  You will have the glucose drink at any time of day  Your blood sugar level will be checked 1 hour later  If your blood sugar is higher than a certain level, another test will be ordered  You will fast and your blood sugar level will be tested  You will have the glucose drink  Your blood will be tested again 1 hour, 2 hours, and 3 hours after you finish the glucose drink  A Tdap vaccine  may be recommended by your healthcare provider  Fundal height  is a measurement of your uterus to check your baby's growth  This number is usually the same as the number of weeks that you have been pregnant   Your healthcare provider may also check your baby's position  Your baby's heart rate  will be checked  Follow up with your obstetrician as directed:  Write down your questions so you remember to ask them during your visits  © Copyright [employer] 2022 Information is for End User's use only and may not be sold, redistributed or otherwise used for commercial purposes  All illustrations and images included in CareNotes® are the copyrighted property of A D A M , Inc  or [address]   The above information is an  only  It is not intended as medical advice for individual conditions or treatments  Talk to your doctor, nurse or pharmacist before following any medical regimen to see if it is safe and effective for you  Kick Counts in Pregnancy   WHAT YOU NEED TO KNOW:   Kick counts measure how much your baby is moving in your womb  A kick from your baby can be felt as a twist, turn, swish, roll, or jab  It is common to feel your baby kicking at 26 to 28 weeks of pregnancy  You may feel your baby kick as early as 20 weeks of pregnancy  You may want to start counting at 28 weeks  DISCHARGE INSTRUCTIONS:   Contact your doctor immediately if:   You feel a change in the number of kicks or movements of your baby  You feel fewer than 10 kicks within 2 hours  You have questions or concerns about your baby's movements  Why measure kick counts:  Your baby's movement may provide information about your baby's health  He or she may move less, or not at all, if there are problems  Your baby may move less if he or she is not getting enough oxygen or nutrition from the placenta  Do not smoke while you are pregnant  Smoking decreases the amount of oxygen that gets to your baby  Talk to your healthcare provider if you need help to quit smoking  Tell your healthcare provider as soon as you feel a change in your baby's movements  When to measure kick counts:   Measure kick counts at the same time every day        Measure kick counts when your baby is awake and most active  Your baby may be most active in the evening  How to measure kick counts:  Check that your baby is awake before you measure kick counts  You can wake up your baby by lightly pushing on your belly, walking, or drinking something cold  Your healthcare provider may tell you different ways to measure kick counts  You may be told to do the following:  Use a chart or clock to keep track of the time you start and finish counting  Sit in a chair or lie on your left side  Place your hands on the largest part of your belly  Count until you reach 10 kicks  Write down how much time it takes to count 10 kicks  It may take 30 minutes to 2 hours to count 10 kicks  It should not take more than 2 hours to count 10 kicks  Follow up with your doctor as directed:  Write down your questions so you remember to ask them during your visits  © Copyright [employer] 2022 Information is for End User's use only and may not be sold, redistributed or otherwise used for commercial purposes  All illustrations and images included in CareNotes® are the copyrighted property of [employer]  or [facility] [address]   The above information is an  only  It is not intended as medical advice for individual conditions or treatments  Talk to your doctor, nurse or pharmacist before following any medical regimen to see if it is safe and effective for you

## 2022-11-04 ENCOUNTER — TELEPHONE (OUTPATIENT)
Dept: OBGYN CLINIC | Facility: CLINIC | Age: 27
End: 2022-11-04

## 2022-11-04 ENCOUNTER — HOSPITAL ENCOUNTER (OUTPATIENT)
Facility: HOSPITAL | Age: 27
Discharge: HOME | End: 2022-11-04
Attending: OBSTETRICS & GYNECOLOGY | Admitting: OBSTETRICS & GYNECOLOGY

## 2022-11-04 VITALS
HEART RATE: 96 BPM | SYSTOLIC BLOOD PRESSURE: 133 MMHG | RESPIRATION RATE: 16 BRPM | DIASTOLIC BLOOD PRESSURE: 78 MMHG | TEMPERATURE: 98.3 F

## 2022-11-04 LAB
BILIRUB UR QL STRIP: NEGATIVE
CLARITY UR: ABNORMAL
COLOR UR: YELLOW
GLUCOSE UR STRIP-MCNC: NEGATIVE MG/DL
HGB UR QL STRIP.AUTO: NEGATIVE
KETONES UR STRIP-MCNC: NEGATIVE MG/DL
LEUKOCYTE ESTERASE UR QL STRIP: ABNORMAL
NITRITE UR QL STRIP: NEGATIVE
PH UR STRIP.AUTO: 6.5 [PH] (ref 4.5–8)
PROT UR STRIP-MCNC: NEGATIVE MG/DL
SP GR UR STRIP.AUTO: 1.01 (ref 1–1.03)
UROBILINOGEN UR QL STRIP.AUTO: 0.2 E.U./DL

## 2022-11-04 NOTE — PROGRESS NOTES
L&D Triage Note - OB/GYN  Britton Villar 32 y o  female MRN: [id number]  Unit/Bed#: LD TRIAGE  Encounter: [id number]      ASSESSMENT:    Britton Villar is a 32 y o   at 34w5d with a history of PTL and PTD presents with back pain that feels similar to her last delivery  PLAN:    1) rule out  labor   -SVE: closed/thick/high   -NST: reactive   -[place]: irritable   -PO hydrate   -Urine Dip: trace leuks    2) Continue routine prenatal care  3) Discharge from [facility] triage with  labor precautions    - Reviewed rupture of membranes, false vs true labor, decreased fetal movement, and vaginal bleeding   - Pt to call provider with any concerns and follow up at her next scheduled prenatal appointment    - Case discussed with Dr Destiny Guerra 32 y o  Maria A Stinson at 34w5d with an Estimated Date of Delivery: 22 comes in concerned with back pain that she has been having since this morning  She has a history of PTD at 36w, and she says this pain feels similar to the pain she felt last time before she broke her water  It is coming and going like a contraction  Otherwise she has no complaints      Her current obstetrical history is uncomplicated    Her past obstetrical history is significant for PTL and PTD at 36w    Contractions: see HPI  Leakage of fluid: denies  Vaginal Bleeding: denies  Fetal movement: present    OBJECTIVE:    Vitals:    22 1530   BP: 133/78   Pulse: 96   Resp: 16   Temp: 98 3 °F (36 8 °C)       ROS:  Constitutional: Negative  Respiratory: Negative  Cardiovascular: Negative    Gastrointestinal: Negative    General Physical Exam:  General: in no apparent distress, non-toxic, alert and cooperative  Cardiovascular: Cor RRR  Lungs: non-labored breathing  Abdomen: abdomen is soft without significant tenderness, masses, organomegaly or guarding  Lower extremeties: nontender    Cervical Exam  SVE: 0 / 0% / -4    Fetal monitoring:  FHT:  140 bpm/ Moderate 6 - 25 bpm / 15 x 15 accelerations present, no decelerations  [place]: irritable     Urine Dip    - trace leuks    [address]  OBGYN PGY-1  11/4/2022 3:58 PM

## 2022-11-04 NOTE — TELEPHONE ENCOUNTER
Martine Mekimmykandis  1995 , 34wk 5 days , c/o lower back , pain not relieved by position change or Tylenol , drinking plenty of water , reports good fetal movement , but states these are the same type of pains she had with her las pregnancy before her water broke , SX started at 10 am this morning and have been constant    Discussed with Dr Elizabeth Torres advised L&D for eval   MM CMA

## 2022-11-14 LAB
DME PARACHUTE DELIVERY DATE ACTUAL: NORMAL
DME PARACHUTE DELIVERY DATE EXPECTED: NORMAL
DME PARACHUTE DELIVERY DATE REQUESTED: NORMAL
DME PARACHUTE ITEM DESCRIPTION: NORMAL
DME PARACHUTE ORDER STATUS: NORMAL
DME PARACHUTE SUPPLIER NAME: NORMAL
DME PARACHUTE SUPPLIER PHONE: NORMAL

## 2022-11-18 ENCOUNTER — ROUTINE PRENATAL (OUTPATIENT)
Dept: OBGYN CLINIC | Facility: CLINIC | Age: 27
End: 2022-11-18

## 2022-11-18 VITALS — WEIGHT: 189 LBS | DIASTOLIC BLOOD PRESSURE: 80 MMHG | SYSTOLIC BLOOD PRESSURE: 140 MMHG | BODY MASS INDEX: 33.48 KG/M2

## 2022-11-18 DIAGNOSIS — O16.3 ELEVATED BLOOD PRESSURE AFFECTING PREGNANCY IN THIRD TRIMESTER, ANTEPARTUM: ICD-10-CM

## 2022-11-18 DIAGNOSIS — Z3A.36 36 WEEKS GESTATION OF PREGNANCY: ICD-10-CM

## 2022-11-18 DIAGNOSIS — Z34.93 PRENATAL CARE IN THIRD TRIMESTER: Primary | ICD-10-CM

## 2022-11-18 NOTE — PROGRESS NOTES
Francisco Lechuga is a 31 yo  at 36w5d presenting for routine PNC- denies any CTX, LOF or VB  Endorses good FM  Urine neg/neg  BP today elevated at 140/80 today- protein in urine negative and she denies any preeclamptic sx  On BP review there is one elevated BP at 17 weeks gestation but otherwise BP have been wnl throughout pregnancy  Preeclamptic labs ordered and reviewed with patient she is to complete today  Has discontinued vaginal progesterone  GBS collected today  Labor and preeclamptic precautions reviewed  RTO in 1 week

## 2022-11-18 NOTE — PATIENT INSTRUCTIONS
Pregnancy at 28 to 38 Weeks   AMBULATORY CARE:   Changes happening with your body: You are considered full term at the beginning of 37 weeks  Your breathing may be easier if your baby has moved down into a head-down position  You may need to urinate more often because the baby may be pressing on your bladder  You may also feel more discomfort and get tired easily  Seek care immediately if:   You develop a severe headache that does not go away  You have new or increased vision changes, such as blurred or spotted vision  You have new or increased swelling in your face or hands  You have vaginal spotting or bleeding  Your water broke or you feel warm water gushing or trickling from your vagina  Call your obstetrician if:   You have more than 5 contractions in 1 hour  You notice any changes in your baby's movements  You have abdominal cramps, pressure, or tightening  You have a change in vaginal discharge  You have chills or a fever  You have vaginal itching, burning, or pain  You have yellow, green, white, or foul-smelling vaginal discharge  You have pain or burning when you urinate, less urine than usual, or pink or bloody urine  You have questions or concerns about your condition or care  How to care for yourself at this stage of your pregnancy:       Eat a variety of healthy foods  Healthy foods include fruits, vegetables, whole-grain breads, low-fat dairy foods, beans, lean meats, and fish  Drink liquids as directed  Ask how much liquid to drink each day and which liquids are best for you  Limit caffeine to less than 200 milligrams each day  Limit your intake of fish to 2 servings each week  Choose fish low in mercury such as canned light tuna, shrimp, salmon, cod, or tilapia  Do not  eat fish high in mercury such as swordfish, tilefish, rohit mackerel, and shark  Take prenatal vitamins as directed    Your need for certain vitamins and minerals, such as folic acid, increases during pregnancy  Prenatal vitamins provide some of the extra vitamins and minerals you need  Prenatal vitamins may also help to decrease the risk of certain birth defects  Rest as needed  Put your feet up if you have swelling in your ankles and feet  Talk to your healthcare provider about exercise  Moderate exercise can help you stay fit  Your healthcare provider will help you plan an exercise program that is safe for you during pregnancy  Do not smoke  Smoking increases your risk of a miscarriage and other health problems during your pregnancy  Smoking can cause your baby to be born early or weigh less at birth  Ask your healthcare provider for information if you need help quitting  Do not drink alcohol  Alcohol passes from your body to your baby through the placenta  It can affect your baby's brain development and cause fetal alcohol syndrome (FAS)  FAS is a group of conditions that causes mental, behavior, and growth problems  Talk to your healthcare provider before you take any medicines  Many medicines may harm your baby if you take them when you are pregnant  Do not take any medicines, vitamins, herbs, or supplements without first talking to your healthcare provider  Never use illegal or street drugs (such as marijuana or cocaine) while you are pregnant  Safety tips during pregnancy:   Avoid hot tubs and saunas  Do not use a hot tub or sauna while you are pregnant, especially during your first trimester  Hot tubs and saunas may raise your baby's temperature and increase the risk of birth defects  Avoid toxoplasmosis  This is an infection caused by eating raw meat or being around infected cat feces  It can cause birth defects, miscarriages, and other problems  Wash your hands after you touch raw meat  Make sure any meat is well-cooked before you eat it  Avoid raw eggs and unpasteurized milk   Use gloves or ask someone else to clean your cat's litter box while you are pregnant  Ask your healthcare provider about travel  The most comfortable time to travel is during the second trimester  Ask your provider if you can travel after 36 weeks  You may not be able to travel in an airplane after 36 weeks  He or she may also recommend you avoid long road trips  Changes happening with your baby:  By 38 weeks, your baby may weigh between 6 and 9 pounds  Your baby may be about 14 inches long from the top of the head to the rump (baby's bottom)  Your baby hears well enough to know your voice  As your baby gets larger, you may feel fewer kicks and more stretching and rolling  Your baby may move into a head-down position  Your baby will also rest lower in your abdomen  What you need to know about prenatal care: Your healthcare provider will check your blood pressure and weight  You may also need the following:  A urine test  may also be done to check for sugar and protein  These can be signs of gestational diabetes or infection  Protein in your urine may also be a sign of preeclampsia  Preeclampsia is a condition that can develop during week 20 or later of your pregnancy  It causes high blood pressure, and it can cause problems with your kidneys and other organs  A gestational diabetes screen  may be done  Your healthcare provider may order either a 1-step or 2-step oral glucose tolerance test (OGTT)  1-step OGTT:  Your blood sugar level will be tested after you have not eaten for 8 hours (fasting)  You will then be given a glucose drink  Your level will be tested again 1 hour and 2 hours after you finish the drink  2-step OGTT:  You do not have to fast for the first part of the test  You will have the glucose drink at any time of day  Your blood sugar level will be checked 1 hour later  If your blood sugar is higher than a certain level, another test will be ordered  You will fast and your blood sugar level will be tested  You will have the glucose drink  Your blood will be tested again 1 hour, 2 hours, and 3 hours after you finish the glucose drink  A blood test  may be done to check for anemia (low iron level)  A Tdap vaccine  may be recommended by your healthcare provider  A group B strep test  is a test that is done to check for group B strep infection  Group B strep is a type of bacteria that may be found in the vagina or rectum  It can be passed to your baby during delivery if you have it  Your healthcare provider will take swab your vagina or rectum and send the sample to the lab for tests  Fundal height  is a measurement of your uterus to check your baby's growth  This number is usually the same as the number of weeks that you have been pregnant  Your healthcare provider may also check your baby's position  Your baby's heart rate  will be checked  Follow up with your obstetrician as directed:  Write down your questions so you remember to ask them during your visits  © [employer] 2022 Information is for End User's use only and may not be sold, redistributed or otherwise used for commercial purposes  All illustrations and images included in CareNotes® are the copyrighted property of A D A M , Inc  or [facility] [address]   The above information is an  only  It is not intended as medical advice for individual conditions or treatments  Talk to your doctor, nurse or pharmacist before following any medical regimen to see if it is safe and effective for you  Preeclampsia During Pregnancy   AMBULATORY CARE:   Preeclampsia  is high blood pressure (BP) that usually develops after week 20 of pregnancy  It can also develop days or weeks after delivery  You may also have protein in your urine or damage to organs such as your kidneys or liver  Chronic hypertension with superimposed preeclampsia is preeclampsia in a woman with a history of hypertension before pregnancy   It can also be preeclampsia that develops before week 20 of pregnancy  Signs and symptoms of preeclampsia:   Swollen face and hands    A sudden weight gain of 5 pounds or more    Headache    Spotted or blurred vision    Pain in your upper abdomen    Call your local emergency number (911 in the [address]) if:   You have a seizure  You have chest pain  Seek care immediately if:   You have severe abdominal pain with or without nausea and vomiting  You develop a severe headache that does not go away with medicine  You have blurred or spotted vision that does not go away  You are bleeding from your vagina  Call your doctor or obstetrician if:   You have new or increased swelling in your face or hands  You are urinating little or not at all  You do not feel your baby's movements as often as usual     You have questions or concerns about your condition or care  How preeclampsia is diagnosed:  Preeclampsia can lead to life-threatening conditions such as a stroke, eclampsia (seizures), or HELLP syndrome (blood cell destruction)  It is important to get screened for high BP during pregnancy  High BP does not always cause symptoms  Symptoms that do develop may be general, such as headaches and swelling that you may think are not serious  Tell your healthcare provider if you had hypertension before you were pregnant  Also tell him or her about symptoms you are having, even if you think it is not serious  BP readings  will be done regularly during your pregnancy  Preeclampsia means your BP is 140/90 or higher in 2 readings 4 hours apart  One or both numbers may be high  Your healthcare provider will also look at the results of your blood and urine tests  Blood tests  are done to check your liver and kidney function  You may need blood tests every week while you are pregnant  Urine tests  are used to check for protein  You may need to give healthcare providers a urine sample at each visit   You may also need to collect your urine every time you urinate for 24 hours  You may still have preeclampsia even if you do not have protein in your urine  Treatment:  Treatment depends on how high your BP is and how many weeks you are into your pregnancy  Before 37 weeks, healthcare providers may want to monitor your condition if your BP is not severely high  The amount of amniotic fluid may be measured every week  Your provider will tell you how often to come in for tests  You may also need any of the following: An ultrasound  may be done every 3 to 4 weeks to check your baby's growth  Medicines  may be given to lower your blood pressure, protect your organs, or prevent seizures  You may be given steroid medicine to help your baby's lungs develop  These may be given if you have to deliver before 37 weeks of pregnancy  Low doses of aspirin after 12 weeks of pregnancy may be recommended if you are at high risk for preeclampsia  Aspirin may help prevent preeclampsia or problems that can happen from preeclampsia  Do not take aspirin unless directed by your healthcare provider  Delivery  may stop preeclampsia  Healthcare providers may deliver your baby right away if he or she is full-term (37 weeks or more)  You may need to deliver your baby early if you or the baby has life-threatening symptoms  Manage preeclampsia during pregnancy:  Your BP will need to be checked by healthcare providers 1 to 2 times each week until your baby is born  The following are ways you can help manage high BP during pregnancy:  Rest as directed  Your healthcare provider may tell you to rest more often if you have mild symptoms of preeclampsia  You may need to be in the hospital if your condition worsens  Do not drink alcohol or smoke  Alcohol, nicotine, and other chemicals in cigarettes and cigars, can increase your BP  They can also harm your baby  Ask your healthcare provider for information if you currently drink alcohol or smoke and need help to quit   E-cigarettes or smokeless tobacco still contain nicotine  Talk to your healthcare provider before you use these products  Do kick counts as directed  You may need to keep track of how often your baby moves or kicks over a certain amount of time  Ask your obstetrician how to do kick counts and how often to do them  Check your weight each day  Weigh yourself every day before breakfast  Weight gain can be a sign of extra fluid in your body  Call your obstetrician if you have gained 2 or more pounds in a week  Follow up with your obstetrician as directed: You will need tests 1 to 2 times a week to check your condition  Tests include blood pressure checks, urine and blood tests, and fetal monitoring  Write down your questions so you remember to ask them during your visits  © Copyright [employer] 2022 Information is for End User's use only and may not be sold, redistributed or otherwise used for commercial purposes  All illustrations and images included in CareNotes® are the copyrighted property of A [employer] A M , Inc  or [facility] [address]   The above information is an  only  It is not intended as medical advice for individual conditions or treatments  Talk to your doctor, nurse or pharmacist before following any medical regimen to see if it is safe and effective for you

## 2022-11-19 LAB
ALBUMIN SERPL-MCNC: 3.7 G/DL (ref 3.9–5)
ALBUMIN/GLOB SERPL: 1.5 {RATIO} (ref 1.2–2.2)
ALP SERPL-CCNC: 86 IU/L (ref 44–121)
ALT SERPL-CCNC: 9 IU/L (ref 0–32)
AST SERPL-CCNC: 15 IU/L (ref 0–40)
BASOPHILS # BLD AUTO: 0 X10E3/UL (ref 0–0.2)
BASOPHILS NFR BLD AUTO: 1 %
BILIRUB SERPL-MCNC: <0.2 MG/DL (ref 0–1.2)
BUN SERPL-MCNC: 10 MG/DL (ref 6–20)
BUN/CREAT SERPL: 18 (ref 9–23)
CALCIUM SERPL-MCNC: 9.4 MG/DL (ref 8.7–10.2)
CHLORIDE SERPL-SCNC: 104 MMOL/L (ref 96–106)
CO2 SERPL-SCNC: 21 MMOL/L (ref 20–29)
CREAT SERPL-MCNC: 0.55 MG/DL (ref 0.57–1)
CREAT UR-MCNC: 30 MG/DL
EGFR: 127 ML/MIN/1.73
EOSINOPHIL # BLD AUTO: 0 X10E3/UL (ref 0–0.4)
EOSINOPHIL NFR BLD AUTO: 1 %
ERYTHROCYTE [DISTWIDTH] IN BLOOD BY AUTOMATED COUNT: 12.3 % (ref 11.7–15.4)
GLOBULIN SER-MCNC: 2.5 G/DL (ref 1.5–4.5)
GLUCOSE SERPL-MCNC: 78 MG/DL (ref 70–99)
HCT VFR BLD AUTO: 40 % (ref 34–46.6)
HGB BLD-MCNC: 13.8 G/DL (ref 11.1–15.9)
IMM GRANULOCYTES # BLD: 0 X10E3/UL (ref 0–0.1)
IMM GRANULOCYTES NFR BLD: 1 %
LYMPHOCYTES # BLD AUTO: 0.9 X10E3/UL (ref 0.7–3.1)
LYMPHOCYTES NFR BLD AUTO: 12 %
MCH RBC QN AUTO: 33.7 PG (ref 26.6–33)
MCHC RBC AUTO-ENTMCNC: 34.5 G/DL (ref 31.5–35.7)
MCV RBC AUTO: 98 FL (ref 79–97)
MONOCYTES # BLD AUTO: 0.6 X10E3/UL (ref 0.1–0.9)
MONOCYTES NFR BLD AUTO: 7 %
NEUTROPHILS # BLD AUTO: 6.2 X10E3/UL (ref 1.4–7)
NEUTROPHILS NFR BLD AUTO: 78 %
PLATELET # BLD AUTO: 181 X10E3/UL (ref 150–450)
POTASSIUM SERPL-SCNC: 4.2 MMOL/L (ref 3.5–5.2)
PROT SERPL-MCNC: 6.2 G/DL (ref 6–8.5)
PROT UR-MCNC: 9.2 MG/DL
PROT/CREAT UR: 307 MG/G CREAT (ref 0–200)
RBC # BLD AUTO: 4.09 X10E6/UL (ref 3.77–5.28)
SODIUM SERPL-SCNC: 138 MMOL/L (ref 134–144)
WBC # BLD AUTO: 7.8 X10E3/UL (ref 3.4–10.8)

## 2022-11-20 LAB — GP B STREP DNA SPEC QL NAA+PROBE: NEGATIVE

## 2022-11-21 ENCOUNTER — TELEPHONE (OUTPATIENT)
Dept: OBGYN CLINIC | Facility: CLINIC | Age: 27
End: 2022-11-21

## 2022-11-21 NOTE — TELEPHONE ENCOUNTER
Received message from Dr Foss Monday: can you please let Martine know that her protein was elevated on her labs- the rest were normal- and that I recommend delivery this week as she is > 37 weeks as soon as we can plan her IOL  Thank you!

## 2022-11-21 NOTE — TELEPHONE ENCOUNTER
Reviewed results and recommendations for IOL with pt  Pt verbalizes understanding and aware will let pt know details once scheduled with hospital for IOL

## 2022-11-21 NOTE — TELEPHONE ENCOUNTER
Pt called in regards to her most recent blood work results  I saw that Dr Buzzy Cushing did send you a message in regards to these  Can you please review and advise for pt? Thank you

## 2022-11-21 NOTE — TELEPHONE ENCOUNTER
Scheduled with Bekah Fine at [facility], [address] [place] [address] 11/23 3pm  Mychart message sent to pt, pink sticky updated, staff message sent to provider, added to calendar

## 2022-11-23 ENCOUNTER — HOSPITAL ENCOUNTER (INPATIENT)
Facility: HOSPITAL | Age: 27
LOS: 2 days | Discharge: HOME | End: 2022-11-25
Attending: OBSTETRICS & GYNECOLOGY | Admitting: OBSTETRICS & GYNECOLOGY

## 2022-11-23 ENCOUNTER — ANESTHESIA EVENT (INPATIENT)
Dept: ANESTHESIOLOGY | Facility: HOSPITAL | Age: 27
End: 2022-11-23

## 2022-11-23 ENCOUNTER — ANESTHESIA (INPATIENT)
Dept: ANESTHESIOLOGY | Facility: HOSPITAL | Age: 27
End: 2022-11-23

## 2022-11-23 DIAGNOSIS — Z3A.37 37 WEEKS GESTATION OF PREGNANCY: Primary | ICD-10-CM

## 2022-11-23 DIAGNOSIS — O16.3 ELEVATED BLOOD PRESSURE AFFECTING PREGNANCY IN THIRD TRIMESTER, ANTEPARTUM: ICD-10-CM

## 2022-11-23 PROBLEM — O14.90 PREECLAMPSIA: Status: ACTIVE | Noted: 2022-11-23

## 2022-11-23 PROBLEM — Z34.90 ENCOUNTER FOR INDUCTION OF LABOR: Status: ACTIVE | Noted: 2022-11-23

## 2022-11-23 LAB
ABO GROUP BLD: NORMAL
BLD GP AB SCN SERPL QL: NEGATIVE
ERYTHROCYTE [DISTWIDTH] IN BLOOD BY AUTOMATED COUNT: 12.2 % (ref 11.6–15.1)
HCT VFR BLD AUTO: 38.8 % (ref 34.8–46.1)
HGB BLD-MCNC: 13.5 G/DL (ref 11.5–15.4)
MCH RBC QN AUTO: 33.5 PG (ref 26.8–34.3)
MCHC RBC AUTO-ENTMCNC: 34.8 G/DL (ref 31.4–37.4)
MCV RBC AUTO: 96 FL (ref 82–98)
PLATELET # BLD AUTO: 189 THOUSANDS/UL (ref 149–390)
PMV BLD AUTO: 9.2 FL (ref 8.9–12.7)
RBC # BLD AUTO: 4.03 MILLION/UL (ref 3.81–5.12)
RH BLD: POSITIVE
SPECIMEN EXPIRATION DATE: NORMAL
WBC # BLD AUTO: 9.59 THOUSAND/UL (ref 4.31–10.16)

## 2022-11-23 RX ORDER — SODIUM CHLORIDE, SODIUM LACTATE, POTASSIUM CHLORIDE, CALCIUM CHLORIDE 600; 310; 30; 20 MG/100ML; MG/100ML; MG/100ML; MG/100ML
125 INJECTION, SOLUTION INTRAVENOUS CONTINUOUS
Status: DISCONTINUED | OUTPATIENT
Start: 2022-11-23 | End: 2022-11-24

## 2022-11-23 RX ORDER — LIDOCAINE HYDROCHLORIDE AND EPINEPHRINE 15; 5 MG/ML; UG/ML
INJECTION, SOLUTION EPIDURAL AS NEEDED
Status: DISCONTINUED | OUTPATIENT
Start: 2022-11-23 | End: 2022-11-24 | Stop reason: HOSPADM

## 2022-11-23 RX ORDER — ROPIVACAINE HYDROCHLORIDE 2 MG/ML
INJECTION, SOLUTION EPIDURAL; INFILTRATION; PERINEURAL CONTINUOUS PRN
Status: DISCONTINUED | OUTPATIENT
Start: 2022-11-23 | End: 2022-11-24 | Stop reason: HOSPADM

## 2022-11-23 RX ORDER — OXYTOCIN/RINGER'S LACTATE 30/500 ML
1-30 PLASTIC BAG, INJECTION (ML) INTRAVENOUS
Status: DISCONTINUED | OUTPATIENT
Start: 2022-11-23 | End: 2022-11-24

## 2022-11-23 RX ADMIN — ROPIVACAINE HYDROCHLORIDE 10 ML/HR: 2 INJECTION, SOLUTION EPIDURAL; INFILTRATION at 23:02

## 2022-11-23 RX ADMIN — SODIUM CHLORIDE, POTASSIUM CHLORIDE, SODIUM LACTATE AND CALCIUM CHLORIDE 125 ML/HR: 600; 310; 30; 20 INJECTION, SOLUTION INTRAVENOUS at 19:49

## 2022-11-23 RX ADMIN — LIDOCAINE HYDROCHLORIDE AND EPINEPHRINE 3 ML: 15; 5 INJECTION, SOLUTION EPIDURAL at 22:56

## 2022-11-23 RX ADMIN — LIDOCAINE HYDROCHLORIDE AND EPINEPHRINE 2 ML: 15; 5 INJECTION, SOLUTION EPIDURAL at 22:59

## 2022-11-23 RX ADMIN — SODIUM CHLORIDE, POTASSIUM CHLORIDE, SODIUM LACTATE AND CALCIUM CHLORIDE 999 ML/HR: 600; 310; 30; 20 INJECTION, SOLUTION INTRAVENOUS at 18:14

## 2022-11-23 RX ADMIN — Medication 2 MILLI-UNITS/MIN: at 18:11

## 2022-11-23 RX ADMIN — ROPIVACAINE HYDROCHLORIDE: 2 INJECTION, SOLUTION EPIDURAL; INFILTRATION at 23:07

## 2022-11-23 NOTE — ASSESSMENT & PLAN NOTE
IOL for PreE wo SF   Inpatient admission   Admission + PreE labs  Place IV, IV hydration  Clear liquid diet   Induction plan: start pitocin titration, eventually followed by [facility]  Epidural per patient request

## 2022-11-23 NOTE — PLAN OF CARE
Problem: POSTPARTUM  Goal: Experiences normal postpartum course  Description: INTERVENTIONS:  - Monitor maternal vital signs  - Assess uterine involution and lochia  Outcome: Progressing  Goal: Appropriate maternal -  bonding  Description: INTERVENTIONS:  - Identify family support  - Assess for appropriate maternal/infant bonding   -Encourage maternal/infant bonding opportunities  - Referral to  or  as needed  Outcome: Progressing  Goal: Establishment of infant feeding pattern  Description: INTERVENTIONS:  - Assess breast/bottle feeding  - Refer to lactation as needed  Outcome: Progressing  Goal: Incision(s), wounds(s) or drain site(s) healing without S/S of infection  Description: INTERVENTIONS  - Assess and document dressing, incision, wound bed, drain sites and surrounding tissue  - Provide patient and family education  - Perform skin care/dressing changes every   Outcome: Progressing     Problem: PAIN - ADULT  Goal: Verbalizes/displays adequate comfort level or baseline comfort level  Description: Interventions:  - Encourage patient to monitor pain and request assistance  - Assess pain using appropriate pain scale  - Administer analgesics based on type and severity of pain and evaluate response  - Implement non-pharmacological measures as appropriate and evaluate response  - Consider cultural and social influences on pain and pain management  - Notify physician/advanced practitioner if interventions unsuccessful or patient reports new pain  Outcome: Progressing     Problem: INFECTION - ADULT  Goal: Absence or prevention of progression during hospitalization  Description: INTERVENTIONS:  - Assess and monitor for signs and symptoms of infection  - Monitor lab/diagnostic results  - Monitor all insertion sites, i e  indwelling lines, tubes, and drains  - Monitor endotracheal if appropriate and nasal secretions for changes in amount and color  - [place] appropriate cooling/warming therapies per order  - Administer medications as ordered  - Instruct and encourage patient and family to use good hand hygiene technique  - Identify and instruct in appropriate isolation precautions for identified infection/condition  Outcome: Progressing  Goal: Absence of fever/infection during neutropenic period  Description: INTERVENTIONS:  - Monitor WBC    Outcome: Progressing     Problem: SAFETY ADULT  Goal: Patient will remain free of falls  Description: INTERVENTIONS:  - Educate patient/family on patient safety including physical limitations  - Instruct patient to call for assistance with activity   - Consult OT/PT to assist with strengthening/mobility   - Keep Call bell within reach  - Keep bed low and locked with side rails adjusted as appropriate  - Keep care items and personal belongings within reach  - Initiate and maintain comfort rounds  - Make Fall Risk Sign visible to staff  - Offer Toileting every  Hours, in advance of need  - Initiate/Maintainalarm  - Obtain necessary fall risk management equipment:   - Apply yellow socks and bracelet for high fall risk patients  - Consider moving patient to room near nurses station  Outcome: Progressing  Goal: Maintain or return to baseline ADL function  Description: INTERVENTIONS:  -  Assess patient's ability to carry out ADLs; assess patient's baseline for ADL function and identify physical deficits which impact ability to perform ADLs (bathing, care of mouth/teeth, toileting, grooming, dressing, etc )  - Assess/evaluate cause of self-care deficits   - Assess range of motion  - Assess patient's mobility; develop plan if impaired  - Assess patient's need for assistive devices and provide as appropriate  - Encourage maximum independence but intervene and supervise when necessary  - Involve family in performance of ADLs  - Assess for home care needs following discharge   - Consider OT consult to assist with ADL evaluation and planning for discharge  - Provide patient education as appropriate  Outcome: Progressing  Goal: Maintains/Returns to pre admission functional level  Description: INTERVENTIONS:  - Perform BMAT or MOVE assessment daily    - Set and communicate daily mobility goal to care team and patient/family/caregiver  - Collaborate with rehabilitation services on mobility goals if consulted  - Perform Range of Motion  times a day  - Reposition patient every  hours  - Dangle patient  times a day  - Stand patient  times a day  - Ambulate patient  times a day  - Out of bed to chair  times a day   - Out of bed for meals times a day  - Out of bed for toileting  - Record patient progress and toleration of activity level   Outcome: Progressing     Problem: Knowledge Deficit  Goal: Patient/family/caregiver demonstrates understanding of disease process, treatment plan, medications, and discharge instructions  Description: Complete learning assessment and assess knowledge base    Interventions:  - Provide teaching at level of understanding  - Provide teaching via preferred learning methods  Outcome: Progressing     Problem: DISCHARGE PLANNING  Goal: Discharge to home or other facility with appropriate resources  Description: INTERVENTIONS:  - Identify barriers to discharge w/patient and caregiver  - Arrange for needed discharge resources and transportation as appropriate  - Identify discharge learning needs (meds, wound care, etc )  - Arrange for interpretive services to assist at discharge as needed  - Refer to Case Management Department for coordinating discharge planning if the patient needs post-hospital services based on physician/advanced practitioner order or complex needs related to functional status, cognitive ability, or social support system  Outcome: Progressing

## 2022-11-23 NOTE — H&P
History and Physical - Obstetrics  Nancy Andre 32 y o  female MRN: [id number]  Unit/Bed#: -01 Encounter: [id number]    ObGyn Provider:  [facility]     ASSESSMENT AND PLAN:  Nancy Andre is a 32y o  year-old  at [facility] Day: 1, admitted for IOL for preeclampsia without severe features  By issue:    * Encounter for induction of labor  Assessment & Plan  IOL for PreE wo SF   Inpatient admission   Admission + PreE labs  Place IV, IV hydration  Clear liquid diet   Induction plan: start pitocin titration, eventually followed by AROM  Epidural per patient request     Preeclampsia  Assessment & Plan  Meets criteria with at least two elevated blood pressures at least 4h apart > 20 weeks gestation, in addition to proteinuria in pregnancy   CBC and CMP on admission   BP monitoring in labor  If patient were to develop severe features, magnesium sulfate infusion for seizure ppx would be indicated    The above assessment and plan was discussed with the admitting provider, Dr Mercedez Sheridan    Expected LOS: >2 Midnights  Admission: INPATIENT     SUBJECTIVE:  Chief Complaint:  Here for induction    History of Present Illness:  Nancy Andre is a 32 y o   female at 44w3d, EMMANUEL 2022, by Last Menstrual Period, Hospital Day: 1, who presents for scheduled induction of labor  Patient had elevated blood pressure x 2 during pregnancy and was noted to have elevated PC ratio on recent lab workup  She was then noted to have an elevated blood pressures on admission, meeting criteria for preeclampsia without severe features  Review of Systems   All other systems reviewed and are negative  Current Pregnancy Problems:  Problem   37 Weeks Gestation of Pregnancy       Past Obstetric History:   Patient's last menstrual period was 2022     OB History    Para Term  AB Living   2 1 0 1 0 1   SAB IAB Ectopic Multiple Live Births   0 0 0 0 1      # Outcome Date GA Lbr Osvaldo/2nd Weight Sex Delivery Anes PTL Lv   2 Current            1  21 34w5d / 00:26 2155 g (4 lb 12 oz) F Vag-Spont EPI Y AAKASH      Name: Levon Cronin (ENRIQUE)      Apgar1: 9  Apgar5: 9       Pregnancy Plan:  Pregnancy: Bassam Pelayo  Fetal sex: Female  Support person: Maribel Medeiros     Delivery Plans  Planned delivery method: Vaginal  Planned delivery location: [facility] L&D  Planned anesthesia: Epidural  Acceptable blood products: All     Post-Delivery Plans  Feeding intentions: Breast Milk    HISTORICAL INFORMATION:  Past Medical History:   Diagnosis Date   • Varicella      Past Surgical History:   Procedure Laterality Date   • WISDOM TOOTH EXTRACTION Bilateral      Social History   Alcohol use: no  Tobacco use: no  Other substance use: no    Other: no    Family History   Problem Relation Age of Onset   • Diabetes Maternal Grandmother    • Colon cancer Paternal Grandmother    • Breast cancer Paternal Grandmother    • Cancer Paternal Grandmother        Allergies: Allergies   Allergen Reactions   • Ceclor [Cefaclor] Other (See Comments)     Childhood reaction       Current Medications:  Current Outpatient Medications   Medication Instructions   • Prenatal Vit-DSS-Fe Cbn-FA (PRENATAL AD PO) Oral   • progesterone 200 mg, Vaginal, Daily at bedtime       OBJECTIVE:  Vitals:  Patient Vitals for the past 24 hrs:   BP Temp Temp src Pulse Resp   22 1736 140/90 -- -- 89 --   22 1705 119/75 -- -- (!) 107 --   22 1637 125/80 -- -- 102 --   22 1548 126/86 97 8 °F (36 6 °C) Oral 100 18     There is no height or weight on file to calculate BMI  Invasive Devices     Peripheral Intravenous Line  Duration           Peripheral IV 22 Dorsal (posterior); Right Hand <1 day                Physical Exam  Vitals reviewed  Constitutional:       General: She is not in acute distress  Appearance: She is normal weight  She is not ill-appearing or diaphoretic  HENT:      Head: Normocephalic and atraumatic  Eyes:      Conjunctiva/sclera: Conjunctivae normal    Cardiovascular:      Rate and Rhythm: Normal rate  Pulmonary:      Effort: Pulmonary effort is normal  No respiratory distress  Abdominal:      General: There is distension (gravid)  Palpations: Abdomen is soft  Genitourinary:     Comments: Normal appearing external genitalia   Musculoskeletal:         General: No swelling or tenderness  Normal range of motion  Cervical back: Normal range of motion  Skin:     General: Skin is warm and dry  Coloration: Skin is not pale  Neurological:      General: No focal deficit present  Mental Status: She is alert and oriented to person, place, and time  Mental status is at baseline  Psychiatric:         Mood and Affect: Mood normal          Behavior: Behavior normal          Thought Content:  Thought content normal          Cervical Exam:         Estimated fetal weight:   Fetus # 1 of 1  Vertex presentation  Fetal growth appeared normal  Placenta Location = Anterior  Placenta Grade = II     MEASUREMENTS (* Included In Average GA)     AC              29 8 cm        33 weeks 6 days* (83%)  BPD              8 0 cm        32 weeks 0 days* (25%)  HC              29 0 cm        31 weeks 6 days* (6%)  Femur            6 3 cm        32 weeks 4 days* (38%)     Cerebellum       4 3 cm        33 weeks 4 days     EFW [place] 4   2116 grams - 4 lbs 11 oz                 (55%)    Presentation: vertex, confirmed by cervical exam     Membranes: intact  Placenta: anterior     Fetal Heart Tracing:  FHT:   Baseline Rate: 125 bpm  Variability: Moderate 6-25 bpm  Accelerations: 15 x 15 or greater, At variable times  Decelerations: None  FHR Category: Category I    [place]:  Contraction Frequency (minutes): 2-7  Contraction Duration (seconds):   Contraction Quality: Mild    Prenatal Labs:  Blood type: A+  Antibody: negative  HIV: negative   Hepatitis B: negative  RPR: negative  Rubella: immune  Varicella: unknown  Gonorrhea/Chlamydia: negative   1 hour Glucose: WNL  Group B strep: negative    Other pertinent admission labs:  n/a    Imaging, EKG, Pathology, and Other Studies: I have personally reviewed pertinent reports          Ethan Elmore MD  OB/GYN, PGY-4  11/23/2022  5:46 PM

## 2022-11-23 NOTE — ASSESSMENT & PLAN NOTE
Systolic (69FUP), JQV:317 , Min:110 , GAF:437   Diastolic (58OHX), UPI:73, Min:65, Max:85      Urine PC 0 3    Asymptomatic on PPD#1

## 2022-11-24 LAB
ALBUMIN SERPL BCP-MCNC: 3.2 G/DL (ref 3.5–5)
ALP SERPL-CCNC: 66 U/L (ref 34–104)
ALT SERPL W P-5'-P-CCNC: 8 U/L (ref 7–52)
ANION GAP SERPL CALCULATED.3IONS-SCNC: 8 MMOL/L (ref 4–13)
AST SERPL W P-5'-P-CCNC: 12 U/L (ref 13–39)
BASE EXCESS BLDCOA CALC-SCNC: -2.4 MMOL/L (ref 3–11)
BASE EXCESS BLDCOV CALC-SCNC: -4 MMOL/L (ref 1–9)
BILIRUB SERPL-MCNC: 0.35 MG/DL (ref 0.2–1)
BUN SERPL-MCNC: 11 MG/DL (ref 5–25)
CALCIUM ALBUM COR SERPL-MCNC: 9.3 MG/DL (ref 8.3–10.1)
CALCIUM SERPL-MCNC: 8.7 MG/DL (ref 8.4–10.2)
CHLORIDE SERPL-SCNC: 105 MMOL/L (ref 96–108)
CO2 SERPL-SCNC: 23 MMOL/L (ref 21–32)
CREAT SERPL-MCNC: 0.54 MG/DL (ref 0.6–1.3)
GFR SERPL CREATININE-BSD FRML MDRD: 127 ML/MIN/1.73SQ M
GLUCOSE SERPL-MCNC: 89 MG/DL (ref 65–140)
HCO3 BLDCOA-SCNC: 23.3 MMOL/L (ref 17.3–27.3)
HCO3 BLDCOV-SCNC: 20.3 MMOL/L (ref 12.2–28.6)
O2 CT VFR BLDCOA CALC: 10.4 ML/DL
OXYHGB MFR BLDCOA: 51.3 %
OXYHGB MFR BLDCOV: 66.7 %
PCO2 BLDCOA: 43.5 MM[HG] (ref 30–60)
PCO2 BLDCOV: 35.1 MM HG (ref 27–43)
PH BLDCOA: 7.35 [PH] (ref 7.23–7.43)
PH BLDCOV: 7.38 [PH] (ref 7.19–7.49)
PO2 BLDCOA: 21.6 MM HG (ref 5–25)
PO2 BLDCOV: 26.1 MM HG (ref 15–45)
POTASSIUM SERPL-SCNC: 3.6 MMOL/L (ref 3.5–5.3)
PROT SERPL-MCNC: 5.9 G/DL (ref 6.4–8.4)
RPR SER QL: NORMAL
SAO2 % BLDCOV: 13.8 ML/DL
SODIUM SERPL-SCNC: 136 MMOL/L (ref 135–147)

## 2022-11-24 PROCEDURE — 3E033VJ INTRODUCTION OF OTHER HORMONE INTO PERIPHERAL VEIN, PERCUTANEOUS APPROACH: ICD-10-PCS | Performed by: OBSTETRICS & GYNECOLOGY

## 2022-11-24 PROCEDURE — 10907ZC DRAINAGE OF AMNIOTIC FLUID, THERAPEUTIC FROM PRODUCTS OF CONCEPTION, VIA NATURAL OR ARTIFICIAL OPENING: ICD-10-PCS | Performed by: OBSTETRICS & GYNECOLOGY

## 2022-11-24 PROCEDURE — 4A1HXCZ MONITORING OF PRODUCTS OF CONCEPTION, CARDIAC RATE, EXTERNAL APPROACH: ICD-10-PCS | Performed by: OBSTETRICS & GYNECOLOGY

## 2022-11-24 RX ORDER — IBUPROFEN 600 MG/1
600 TABLET ORAL EVERY 6 HOURS PRN
Status: DISCONTINUED | OUTPATIENT
Start: 2022-11-24 | End: 2022-11-25 | Stop reason: HOSPADM

## 2022-11-24 RX ORDER — SIMETHICONE 80 MG
80 TABLET,CHEWABLE ORAL EVERY 6 HOURS PRN
Status: DISCONTINUED | OUTPATIENT
Start: 2022-11-24 | End: 2022-11-25 | Stop reason: HOSPADM

## 2022-11-24 RX ORDER — ACETAMINOPHEN 325 MG/1
650 TABLET ORAL EVERY 6 HOURS PRN
Status: DISCONTINUED | OUTPATIENT
Start: 2022-11-24 | End: 2022-11-25 | Stop reason: HOSPADM

## 2022-11-24 RX ORDER — DOCUSATE SODIUM 100 MG/1
100 CAPSULE, LIQUID FILLED ORAL 2 TIMES DAILY
Status: DISCONTINUED | OUTPATIENT
Start: 2022-11-24 | End: 2022-11-25 | Stop reason: HOSPADM

## 2022-11-24 RX ORDER — ONDANSETRON 2 MG/ML
4 INJECTION INTRAMUSCULAR; INTRAVENOUS EVERY 6 HOURS PRN
Status: DISCONTINUED | OUTPATIENT
Start: 2022-11-24 | End: 2022-11-25 | Stop reason: HOSPADM

## 2022-11-24 RX ORDER — DIAPER,BRIEF,INFANT-TODD,DISP
1 EACH MISCELLANEOUS DAILY PRN
Status: DISCONTINUED | OUTPATIENT
Start: 2022-11-24 | End: 2022-11-25 | Stop reason: HOSPADM

## 2022-11-24 RX ORDER — BISACODYL 10 MG
10 SUPPOSITORY, RECTAL RECTAL DAILY PRN
Status: DISCONTINUED | OUTPATIENT
Start: 2022-11-24 | End: 2022-11-25 | Stop reason: HOSPADM

## 2022-11-24 RX ORDER — DIPHENHYDRAMINE HCL 25 MG
25 TABLET ORAL EVERY 6 HOURS PRN
Status: DISCONTINUED | OUTPATIENT
Start: 2022-11-24 | End: 2022-11-25 | Stop reason: HOSPADM

## 2022-11-24 RX ORDER — CALCIUM CARBONATE 200(500)MG
1000 TABLET,CHEWABLE ORAL 3 TIMES DAILY PRN
Status: DISCONTINUED | OUTPATIENT
Start: 2022-11-24 | End: 2022-11-25 | Stop reason: HOSPADM

## 2022-11-24 RX ADMIN — SODIUM CHLORIDE, POTASSIUM CHLORIDE, SODIUM LACTATE AND CALCIUM CHLORIDE 125 ML/HR: 600; 310; 30; 20 INJECTION, SOLUTION INTRAVENOUS at 01:54

## 2022-11-24 RX ADMIN — HYDROCORTISONE 1 APPLICATION: 1 CREAM TOPICAL at 04:49

## 2022-11-24 RX ADMIN — Medication 1 APPLICATION: at 04:49

## 2022-11-24 RX ADMIN — IBUPROFEN 600 MG: 600 TABLET, FILM COATED ORAL at 21:56

## 2022-11-24 RX ADMIN — IBUPROFEN 600 MG: 600 TABLET, FILM COATED ORAL at 12:18

## 2022-11-24 RX ADMIN — WITCH HAZEL 1 PAD: 500 SOLUTION RECTAL; TOPICAL at 04:49

## 2022-11-24 NOTE — ANESTHESIA PREPROCEDURE EVALUATION
Procedure:  LABOR ANALGESIA    Relevant Problems   CARDIO   (+) Preeclampsia      GYN   (+) 37 weeks gestation of pregnancy   (+) Hx of  delivery, currently pregnant, second trimester        Physical Exam    Airway    Mallampati score: II  TM Distance: >3 FB  Neck ROM: full     Dental   No notable dental hx     Cardiovascular      Pulmonary      Other Findings        Anesthesia Plan  ASA Score- 2     Anesthesia Type- epidural with ASA Monitors  Additional Monitors:   Airway Plan:           Plan Factors-Exercise tolerance (METS): >4 METS  Chart reviewed  Existing labs reviewed  Patient summary reviewed  Induction-     Postoperative Plan-     Informed Consent- Anesthetic plan and risks discussed with patient  I personally reviewed this patient with the CRNA  Discussed and agreed on the Anesthesia Plan with the CRNA  Etienne Cazares

## 2022-11-24 NOTE — OB LABOR/OXYTOCIN SAFETY PROGRESS
Oxytocin Safety Progress Check Note - Adolfo Fairbanks 32 y o  female MRN: [id number]    Unit/Bed#: -01 Encounter: [id number]    Dose (josiah-units/min) Oxytocin: 12 josiah-units/min  Contraction Frequency (minutes): irreg  Contraction Quality: Mild  Tachysystole: No   Cervical Dilation: 4        Cervical Effacement: 80  Fetal Station: -1  Baseline Rate: 140 bpm  Fetal Heart Rate: 148 BPM  FHR Category: Category I  Oxytocin Safety Progress Check: Safety check completed            Vital Signs:   Vitals:    11/23/22 2302   BP:    Pulse: 93   Resp:    Temp:    SpO2: (!) 83%       Notes/comments:   Evaluated patient after 2 hours  Now comfortable with epidural  Found to be changed to 4cm dilated  Category I FHT  Will continue pitocin titration and continue to monitor      Discussed with Dr Alexandra Taylor MD 11/23/2022 11:32 PM

## 2022-11-24 NOTE — OB LABOR/OXYTOCIN SAFETY PROGRESS
Oxytocin Safety Progress Check Note - Jayshree Leroy 32 y o  female MRN: [id number]    Unit/Bed#: -01 Encounter: [id number]    Dose (josiah-units/min) Oxytocin: 8 josiah-units/min  Contraction Frequency (minutes): 2-3  Contraction Quality: Mild  Tachysystole: No   Cervical Dilation: 2        Cervical Effacement: 80  Fetal Station: -1  Baseline Rate: 140 bpm  Fetal Heart Rate: 154 BPM  FHR Category: Category I  Oxytocin Safety Progress Check: Safety check completed      Vital Signs:   Vitals:    11/23/22 2015   BP: 124/84   Pulse: 91   Resp:    Temp:        Notes/comments:   arom clear, continue to monitor and manage      Louis Hu MD 11/23/2022 8:23 PM

## 2022-11-24 NOTE — DISCHARGE INSTRUCTIONS
Vaginal Delivery   WHAT YOU SHOULD KNOW:   A vaginal delivery is the birth of your baby through your vagina (birth canal)  AFTER YOU LEAVE:   Medicines:  NSAIDs  help decrease swelling and pain or fever  This medicine is available with or without a doctor's order  NSAIDs can cause stomach bleeding or kidney problems in certain people  If you take blood thinner medicine, always ask your healthcare provider if NSAIDs are safe for you  Always read the medicine label and follow directions  Take your medicine as directed  Call your healthcare provider if you think your medicine is not helping or if you have side effects  Tell him if you are allergic to any medicine  Keep a list of the medicines, vitamins, and herbs you take  Include the amounts, and when and why you take them  Bring the list or the pill bottles to follow-up visits  Carry your medicine list with you in case of an emergency  Follow up with your primary healthcare provider:  Most women need to return 6 weeks after a vaginal delivery  Ask about how to care for your wounds or stitches  Write down your questions so you remember to ask them during your visits  Activity:  Rest as much as possible  Try to keep all activities short  You may be able to do some exercise soon after you have your baby  Talk with your primary healthcare provider before you start exercising  If you work outside the home, ask when you can return to your job  Kegel exercises:  Kegel exercises may help your vaginal and rectal muscles heal faster  You can do Kegel exercises by tightening and relaxing the muscles around your vagina  Kegel exercises help make the muscles stronger  Breast care:  When your milk comes in, your breasts may feel full and hard  Ask how to care for your breasts, even if you are not breastfeeding  Constipation:  Do not try to push the bowel movement out if it is too hard   High-fiber foods, extra liquids, and regular exercise can help you prevent constipation  Examples of high-fiber foods are fruit and bran  Prune juice and water are good liquids to drink  Regular exercise helps your digestive system work  You may also be told to take over-the-counter fiber and stool softener medicines  Take these items as directed  Hemorrhoids:  Pregnancy can cause severe hemorrhoids  You may have rectal pain because of the hemorrhoids  Ask how to prevent or treat hemorrhoids  Perineum care: Your perineum is the area between your vagina and anus  Keep the area clean and dry to help it heal and to prevent infection  Wash the area gently with soap and water when you bathe or shower  Rinse your perineum with warm water when you use the toilet  Your primary healthcare provider may suggest you use a warm sitz bath to help decrease pain  A sitz bath is a bathtub or basin filled to hip level  Stay in the sitz bath for 20 to 30 minutes, or as directed  Vaginal discharge: You will have vaginal discharge, called lochia, after your delivery  The lochia is bright red the first day or two after the birth  By the fourth day, the amount decreases, and it turns red-brown  Use a sanitary pad rather than a tampon to prevent a vaginal infection  It is normal to have lochia up to 8 weeks after your baby is born  Monthly periods: Your period may start again within 7 to 12 weeks after your baby is born  If you are breastfeeding, it may take longer for your period to start again  You can still get pregnant again even though you do not have your monthly period  Talk with your primary healthcare provider about a birth control method that will be good for you if you do not want to get pregnant  Mood changes: Many new mothers have some kind of mood changes after delivery  Some of these changes occur because of lack of sleep, hormone changes, and caring for a new baby  Some mood changes can be more serious, such as postpartum depression   Talk with your primary healthcare provider if you feel unable to care for yourself or your baby  Sexual activity:  You may need to avoid sex for 6 to 7 weeks after you have your baby  You may notice you have a decreased desire for sex, or sex may be painful  You may need to use a vaginal lubricant (gel) to help make sex more comfortable  Contact your primary healthcare provider if:   You have heavy vaginal bleeding that fills 1 or more sanitary pads in 1 hour  You have a fever  Your pain does not go away, or gets worse  The skin between your vagina and rectum is swollen, warm, or red  You have swollen, hard, or painful breasts  You feel very sad or depressed  You feel more tired than usual      You have questions or concerns about your condition or care  Seek care immediately or call 911 if:   You have pus or yellow drainage coming from your vagina or wound  You are urinating very little, or not at all  Your arm or leg feels warm, tender, and painful  It may look swollen and red  You feel lightheaded, have sudden and worsening chest pain, or trouble breathing  You may have more pain when you take deep breaths or cough, or you may cough up blood  © 2014 [address] is for End User's use only and may not be sold, redistributed or otherwise used for commercial purposes  All illustrations and images included in CareNotes® are the copyrighted property of [employer]  or Arnold Salas  The above information is an  only  It is not intended as medical advice for individual conditions or treatments  Talk to your doctor, nurse or pharmacist before following any medical regimen to see if it is safe and effective for you  Postpartum Perineal Care   WHAT YOU NEED TO KNOW:   Postpartum perineal care is care for your perineum after you have a baby  The perineum is your vagina and anus     DISCHARGE INSTRUCTIONS:   Care for your perineum:  Healthcare providers will give you a small squirt bottle and show you how to use it  Do the following after you use the toilet and before you put on a new pad:  Remove the soiled pad    Use the squirt bottle to rinse your perineum from front to back while you sit on the toilet     Pat the area dry from front to back with toilet paper or a cotton cloth     Put on a fresh pad    Wash your hands  Decrease pain:  Ask your healthcare provider about these and other ways to decrease perineal pain:  Sitz baths:  Healthcare providers may give you a portable sitz bath  This is a small tub that fits in the toilet  Fill the sitz bath or bathtub with 4 to 6 inches of warm water  Sit in the warm water for 20 minutes 2 to 3 times a day  Ice:  Ice helps decrease swelling and pain  Ice may also help prevent tissue damage  Use an ice pack, or put crushed ice in a plastic bag  Cover it with a towel and place it on your perineum for 15 to 20 minutes every hour, or as directed  Medicine spray, wipes, or pads:  Healthcare providers may give you a medicine spray or wipes soaked with numbing medicine to decrease the pain  Pads that contain an herb called witch hazel may also help reduce pain  Use these after perineal care or a sitz bath  Follow up with your healthcare provider as directed:  Write down your questions so you remember to ask them during your visits  Contact your healthcare provider if:   You have heavy vaginal bleeding that fills 1 or more sanitary pads in 1 hour  You have foul-smelling vaginal discharge  You feel weak or lightheaded  You have questions or concerns about your condition or care  Seek care immediately or call 911 if:   You have large blood clots or bright red blood coming from your vagina  You have abdominal pain, vomiting, and a fever  © 2017 [address]  Information is for End User's use only and may not be sold, redistributed or otherwise used for commercial purposes   All illustrations and images included in [facility] are the copyrighted property of A D A M , Inc  or Arnold Salas  The above information is an  only  It is not intended as medical advice for individual conditions or treatments  Talk to your doctor, nurse or pharmacist before following any medical regimen to see if it is safe and effective for you  Postpartum Depression   WHAT YOU NEED TO KNOW:   What is postpartum depression? Postpartum depression is a mood disorder that occurs after giving birth  A mood is an emotion or a feeling  Moods affect your behavior and how you feel about yourself and life in general  Depression is a sad mood that you cannot control  Women often feel sad, afraid, or nervous after their baby is born  These feelings are called postpartum blues or baby blues, and they usually go away in 1 to 2 weeks  With postpartum depression, these symptoms get worse and continue for more than 2 weeks  Postpartum depression is a serious condition that affects your daily activities and relationships  What causes postpartum depression? Healthcare providers do not know exactly what causes postpartum depression  It may be caused by a sudden drop in hormone levels after childbirth  A previous episode of postpartum depression or a family history of depression may increase your risk  Several things may trigger postpartum depression:  Lack of support from the baby's father or other family members    Feeling more tired than usual    Stress, a poor diet, or lack of sleep    Pain after childbirth or pain during breastfeeding    Sudden change in lifestyle  How is postpartum depression diagnosed? Postpartum depression affects your daily activities and your relationships with other people  Healthcare providers will ask you questions about your signs and symptoms and how they are affecting your life  The symptoms of postpartum depression usually begin within 1 month after childbirth   You feel depressed or lose interest in activities you enjoy nearly every day for at least 2 weeks  You also have 4 or more of the following symptoms: You feel tired or have less energy than usual      You feel unimportant or guilty most of the time  You think about hurting or killing yourself  Your appetite changes  You may lose your appetite and lose weight without trying  Your appetite may also increase and you may gain weight  You are restless, irritable, or withdrawn  You have trouble concentrating and remembering things  You have trouble doing daily tasks or making decisions  You have trouble sleeping, even after the baby is asleep  How is postpartum depression treated? Psychotherapy:  During therapy, you will talk with healthcare providers about how to cope with your feelings and moods  This can be done alone or in a group  It may also be done with family members or your partner  Antidepressants: This medicine is given to decrease or stop the symptoms of depression  You usually need to take antidepressants for several weeks before you begin to feel better  Do not stop taking antidepressants unless your healthcare provider tells you to  Healthcare providers may try a different antidepressant if one type does not work  What can I do to feel better? Rest:  Do not try to do everything all at the same time  Do only what is needed and let other things wait until later  Ask your family or friends for help, especially if you have other children  Ask your partner to help with night feedings or other baby care  Try to sleep when the baby naps  Get emotional support:  Share your feelings with your partner, a friend, or another mother  Take care of yourself:  Shower and dress each day  Do not skip meals  Try to get out of the house a little each day  Get regular exercise  Eat a healthy diet  Avoid alcohol because it can make your depression worse  Do not isolate yourself  Go for a walk or meet with a friend   It is also important that you have some time by yourself each day  How do I find support and more information? [address] [facility], Public Information & Communication Branch  [address], [address], [address]  [place] [address]   Phone: [phone]  Phone: [phone]  Web Address: [facility]  When should I contact my healthcare provider? You cannot make it to your next visit  Your depression does not get better with treatment or it gets worse  You have questions or concerns about your condition or care  When should I seek immediate care or call 911? You think about hurting or killing yourself, your baby, or someone else  You feel like other people want to hurt you  You hear voices telling you to hurt yourself or your baby  CARE AGREEMENT:   You have the right to help plan your care  Learn about your health condition and how it may be treated  Discuss treatment options with your caregivers to decide what care you want to receive  You always have the right to refuse treatment  The above information is an  only  It is not intended as medical advice for individual conditions or treatments  Talk to your doctor, nurse or pharmacist before following any medical regimen to see if it is safe and effective for you  ©  2600 [facility] Information is for End User's use only and may not be sold, redistributed or otherwise used for commercial purposes  All illustrations and images included in CareNotes® are the copyrighted property of [employer]  or Arnold Salas  Postpartum Bleeding   WHAT YOU NEED TO KNOW:   Postpartum bleeding is vaginal bleeding after childbirth  This bleeding is normal, whether your baby was born vaginally or by   It contains blood and the tissue that lined the inside of your uterus when you were pregnant     DISCHARGE INSTRUCTIONS:   What to expect with postpartum bleeding:  Postpartum bleeding usually lasts at least 10 days, and may last longer than 6 weeks  Your bleeding may range from light (barely staining a pad) to heavy (soaking a pad in 1 hour)  Usually, you have heavier bleeding right after childbirth, which slows over the next few weeks until it stops  The bleeding is red or dark brown with clots for the first 1 to 3 days  It then turns pink for several days, and then becomes a white or yellow discharge until it ends  Follow up with your obstetrician as directed:  Do not have sex until your obstetrician says it is okay  Write down your questions so you remember to ask them during your visits  Contact your healthcare provider or obstetrician if:   Your bleeding increases, or you have heavy bleeding that soaks a pad in 1 hour for 2 hours in a row  You pass large blood clots  You are breathing faster than normal, or your heart is beating faster than normal     You are urinating less than usual, or not at all  You feel dizzy  You have questions or concerns about your condition or care  Seek immediate care or call 911 if:   You are suddenly short of breath and feel lightheaded  You have sudden chest pain  © 2017 [address]  Information is for End User's use only and may not be sold, redistributed or otherwise used for commercial purposes  All illustrations and images included in CareNotes® are the copyrighted property of [employer]  or Arnold Salas  The above information is an  only  It is not intended as medical advice for individual conditions or treatments  Talk to your doctor, nurse or pharmacist before following any medical regimen to see if it is safe and effective for you  Breast Care for the Breast Feeding Mother   WHAT YOU SHOULD KNOW:   Your breasts will go through normal changes while you are breastfeeding  Sometimes breast and nipple problems can develop while you are breastfeeding   Learn about changes that are normal and those that may be a problem  Breast care can help you prevent and manage problems so you and your baby can enjoy the benefits of breastfeeding  AFTER YOU LEAVE:   Breast changes while you are breastfeeding: For the first few days after your baby is born, your body makes a small amount of breast milk (colostrum)  Within about 2 to 5 days, your body will begin making mature milk  It may take up to 10 days or longer for mature milk to come in  When your mature milk comes in, your breasts will become full and firm  They may feel tender  Breastfeeding your baby will decrease the full feeling in your breasts  You may feel a tingly sensation during feedings as milk is released from your breasts  This is called the milk let-down reflex  After 7 or more days, the fullness may feel like it has decreased  Your nipples should look the same as they did before you started breastfeeding  Breasts that feel full before and empty after breastfeeding are signs that breastfeeding is going well  Breast problems that can occur while you are breastfeeding:   Nipple soreness  may occur when you begin to breastfeed your baby  You may also have nipple soreness if your baby is not latched on to your breast correctly  Correct positioning and latch-on may decrease or stop the pain in your nipples  Work with your caregivers to help your baby latch on correctly  It may also be helpful to place warm, wet compresses on your nipples to help decrease pain  Plugged milk ducts  may cause painful breast lumps  Plugged ducts may be caused by not emptying your breasts completely during feedings  When your baby pauses during breastfeeding, massage and gently squeeze your breast  Gentle massage may unplug a blocked milk duct  Pump out any milk left in your breasts after your baby is done breastfeeding  Avoid wearing tight tops, tight bras, or under-wire bras, because they may put pressure on your breasts      Engorgement  may occur as your milk comes in soon after you begin breastfeeding  Engorgement may cause your breasts to become swollen and painful  Your breasts may also become engorged if you miss a feeding or you do not breastfeed on demand  The best way to decrease engorgement symptoms is to empty your breasts by feeding your baby often  Engorgement can make it hard for your baby to latch on to your breast  If this happens, express a small amount of milk and then have your baby latch on  Cold compresses, gel packs, or ice packs on your breasts can help decrease pain and swelling  Ask your caregiver how often and how long you should use cold, or ice packs  A breast infection called mastitis  can develop if you have plugged milk ducts or engorgement  Mastitis causes your breasts to become red, swollen, and painful  You may also have flu-like symptoms, such as chills and a fever  Place heat on your breasts to help decrease the pain  You may want to place a moist, warm cloth on the painful breast or both of your breasts  Ask how often to do this  Your primary healthcare provider [facility]) may suggest that you take an NSAID, such as ibuprofen, to decrease pain and swelling  He may also order antibiotics to treat mastitis  Ask about feeding your baby when you have a breast infection  How to help prevent or manage breast problems while you are breastfeeding:   Learn how to position your baby and latch him on correctly  To latch your baby correctly to your breast, make sure that his mouth covers most of your areola (dark area around your nipple)  He should not be attached only to the nipple  Your baby is latched on well if you feel comfortable and do not feel pain  A correct latch helps him get enough milk and can help to prevent sore nipples and other breast problems  There are several breastfeeding positions that you can try  Find the position that works best for you and your baby   Ask your caregiver for more information about how to hold and breastfeed your baby  Prevent biting  Your baby may get teeth at about 1to 3months of age  To help prevent biting, break his suction once he is finished or if he has fallen asleep  To break his suction, slip a finger into the side of his mouth  If your baby bites you, respond with surprise or unhappiness  Offer praise when he does not bite you  Breastfeed your baby regularly  Feed your baby 8 to 12 times a day  You may need to wake up your baby at night to feed him  It is okay to feed from 1 or both breasts at each feeding  Your baby should breastfeed from both breasts equally over the course of a day  If your baby only feeds from 1 side during a feeding, offer your other breast to him first for the next feeding  Schedule and keep follow-up visits  Talk to your baby's pediatrician or your PHP during follow-up visits if you have breast problems  Caregivers may suggest that you, or you and your partner, attend classes on breastfeeding  You also may want to join a breastfeeding support group  Caregivers may suggest that you see a lactation consultant  This is a caregiver who can help you with breastfeeding  Contact your PHP if:   You have a fever and chills  You have body aches and you feel like you do not have any energy  One or both of your breasts is red, swollen or hard, painful, and feels warm or hot  You have breast engorgement that does not get better within 24 hours  You see or feel a lump in your breast that hurts when you touch it  You have nipple pain during breastfeeding or between feedings  Your nipples are red, dry, cracked, or bleeding, or they have scabs on them  You have questions or concerns about your condition or care  © 2014 [address] is for End User's use only and may not be sold, redistributed or otherwise used for commercial purposes   All illustrations and images included in CareNotes® are the copyrighted property of A  D A M [employer] Inc  or Arnold Salas  The above information is an  only  It is not intended as medical advice for individual conditions or treatments  Talk to your doctor, nurse or pharmacist before following any medical regimen to see if it is safe and effective for you

## 2022-11-24 NOTE — ANESTHESIA POSTPROCEDURE EVALUATION
Post-Op Assessment Note    CV Status:  Stable  Pain Score: 0    Pain management: adequate     Mental Status:  Alert and awake   Hydration Status:  Euvolemic   PONV Controlled:  Controlled   Airway Patency:  Patent      Post Op Vitals Reviewed: Yes      Staff: CRNA     Post-op block assessment: no complications and catheter intact      No notable events documented      /73 (11/24/22 0245)    Temp      Pulse     Resp      SpO2

## 2022-11-24 NOTE — PLAN OF CARE
Problem: Labor & Delivery  Goal: Manages discomfort  Description: Assess and monitor for signs and symptoms of discomfort  Assess patient's pain level regularly and per hospital policy  Administer medications as ordered  Support use of nonpharmacological methods to help control pain such as distraction, imagery, relaxation, and application of heat and cold  Collaborate with interdisciplinary team and patient to determine appropriate pain management plan  1  Include patient in decisions related to comfort  2  Offer non-pharmacological pain management interventions  3  Report ineffective pain management to physician  Outcome: Completed  Goal: Patient vital signs are stable  Description: 1  Assess vital signs - vaginal delivery    Outcome: Completed

## 2022-11-24 NOTE — LACTATION NOTE
This note was copied from a baby's chart  CONSULT - LACTATION  Baby Girl (Martine) Mehesy 0 days female MRN: [id number]    [facility] NURSERY Room / Bed: (N)/(N) Encounter: [id number]    Maternal Information     MOTHER:  Mary Bonilla  Maternal Age: 32 y o    OB History: # 1 - Date: 21, Sex: Female, Weight: 2155 g (4 lb 12 oz), GA: 34w5d, Delivery: Vaginal, Spontaneous, Apgar1: 9, Apgar5: 9, Living: Living, Birth Comments: None    # 2 - Date: 22, Sex: Female, Weight: 2700 g (5 lb 15 2 oz), GA: 37w4d, Delivery: Vaginal, Spontaneous, Apgar1: 8, Apgar5: 9, Living: Living, Birth Comments: None   Previouse breast reduction surgery? No    Lactation history:   Has patient previously breast fed: Yes   How long had patient previously breast fed: pumped early on, went on to breastfeed for 12 mo   Previous breast feeding complications: Infant separation (baby in NICU)     Past Surgical History:   Procedure Laterality Date   • WISDOM TOOTH EXTRACTION Bilateral         Birth information:  YOB: 2024   Time of birth: 1:46 AM   Sex: female   Delivery type: Vaginal, Spontaneous   Birth Weight: 2700 g (5 lb 15 2 oz)   Percent of Weight Change: 0%     Gestational Age: 37w1d   [unfilled]    Assessment     Breast and nipple assessment: normal assessment     Assessment: sleepy    Feeding assessment: feeding well  LATCH:  Latch: Repeated attempts, hold nipple in mouth, stimulate to suck   Audible Swallowing: Spontaneous and intermittent (24 hours old)   Type of Nipple: Everted (After stimulation)   Comfort (Breast/Nipple): Soft/non-tender   Hold (Positioning): Partial assist, teach one side, mother does other, staff holds   LATCH Score: 8          Feeding recommendations:  breast feed on demand     Met with mother  Provided mother with Ready, Set, Baby booklet  Discussed Skin to Skin contact an benefits to mom and baby    Talked about the delay of the first bath until baby has adjusted  Spoke about the benefits of rooming in  Feeding on cue and what that means for recognizing infant's hunger  Avoidance of pacifiers for the first month discussed  Talked about exclusive breastfeeding for the first 6 months  Positioning and latch reviewed as well as showing images of other feeding positions  Discussed the properties of a good latch in any position  Reviewed hand/manual expression  Discussed s/s that baby is getting enough milk and some s/s that breastfeeding dyad may need further help  Gave information on common concerns, what to expect the first few weeks after delivery, preparing for other caregivers, and how partners can help  Resources for support also provided  Information on hand expression given  Discussed benefits of knowing how to manually express breast including stimulating milk supply, softening nipple for latch and evacuating breast in the event of engorgement  Discussed 2nd night syndrome and ways to calm infant  Hand out given  Provided DC booklet at this time, enc family to review and prepare questions for day of DC  Assisted parent to place baby skin to skin in football hold  Discussed importance of alignment of baby's ear, shoulder, and hip in any preferred position  Worked on supporting baby at breast level and beginning the feed with baby's nose arriving at the nipple  Then, using areolar compression while guiding baby chin-forward to the breast to achieve a deep, comfortable latch  Wide mouth and rocker suck observed, baby falls asleep quickly but Mom states baby has had good feedings and good output  Reviewed signs of effective breastfeeding: audible swallows, strong but comfortable tugging while latched, breasts softening (after milk comes in), baby falling asleep and releasing the breast, and meeting daily diaper goals  Mom has a breast pump at home       Sena Diaz RN 11/24/2022 5:41 PM

## 2022-11-24 NOTE — OB LABOR/OXYTOCIN SAFETY PROGRESS
Oxytocin Safety Progress Check Note - Saira Stanley 32 y o  female MRN: [id number]    Unit/Bed#: -01 Encounter: [id number]    Dose (josiah-units/min) Oxytocin: 12 josiah-units/min  Contraction Frequency (minutes): 2-3  Contraction Quality: Mild, Moderate  Tachysystole: No   Cervical Dilation: 6        Cervical Effacement: 90  Fetal Station: 0  Baseline Rate: 150 bpm  Fetal Heart Rate: 150 BPM  FHR Category: Category I  Oxytocin Safety Progress Check: Safety check completed            Vital Signs:   Vitals:    11/23/22 2331   BP: 118/63   Pulse: 67   Resp:    Temp:    SpO2:        Notes/comments:   SVE 6/90/0  FHT category 1  Ctx q2-3m, pit 12 mU/min  Continue current management  Titrate as tolerated          Masha Barr MD 11/24/2022 1:17 AM

## 2022-11-24 NOTE — PLAN OF CARE
Problem: Knowledge Deficit  Goal: Patient/family/caregiver demonstrates understanding of disease process, treatment plan, medications, and discharge instructions  Description: Complete learning assessment and assess knowledge base  Interventions:  - Provide teaching at level of understanding  - Provide teaching via preferred learning methods  11/23/2022 2048 by Raeann Thurman RN  Outcome: Progressing  11/23/2022 2048 by Raeann Thurman RN  Outcome: Progressing  Goal: Verbalizes understanding of labor plan  Description: Assess patient/family/caregiver's baseline knowledge level and ability to understand information  Provide education via patient/family/caregiver's preferred learning method at appropriate level of understanding  1  Provide teaching at level of understanding  2  Provide teaching via preferred learning method(s)  11/23/2022 2048 by Raeann Thurman RN  Outcome: Progressing  11/23/2022 2048 by Raeann Thurman RN  Outcome: Progressing     Problem: Labor & Delivery  Goal: Manages discomfort  Description: Assess and monitor for signs and symptoms of discomfort  Assess patient's pain level regularly and per hospital policy  Administer medications as ordered  Support use of nonpharmacological methods to help control pain such as distraction, imagery, relaxation, and application of heat and cold  Collaborate with interdisciplinary team and patient to determine appropriate pain management plan  1  Include patient in decisions related to comfort  2  Offer non-pharmacological pain management interventions  3  Report ineffective pain management to physician  Outcome: Progressing  Goal: Patient vital signs are stable  Description: 1  Assess vital signs - vaginal delivery    Outcome: Progressing

## 2022-11-24 NOTE — PLAN OF CARE
Problem: Knowledge Deficit  Goal: Patient/family/caregiver demonstrates understanding of disease process, treatment plan, medications, and discharge instructions  Description: Complete learning assessment and assess knowledge base  Interventions:  - Provide teaching at level of understanding  - Provide teaching via preferred learning methods  Outcome: Completed  Goal: Verbalizes understanding of labor plan  Description: Assess patient/family/caregiver's baseline knowledge level and ability to understand information  Provide education via patient/family/caregiver's preferred learning method at appropriate level of understanding  1  Provide teaching at level of understanding  2  Provide teaching via preferred learning method(s)    Outcome: Completed

## 2022-11-24 NOTE — DISCHARGE SUMMARY
Discharge Summary - OB/GYN   Concha Suh 32 y o  female MRN: [id number]  Unit/Bed#: -01 Encounter: [id number]      Admission Date: 2022     Discharge Date: 2022    Admitting Diagnosis:   1  Pregnancy at 37w4d  2  Preeclampsia    Discharge Diagnosis:   Same, delivered    Procedures: spontaneous vaginal delivery    Delivering Attending: Jovanny Pop MD  Discharge Attending: Roya Potts MD    Hospital Course:     Ms Concha Suh is a 32 y o   at 37w4d  She presented to labor and delivery for scheduled induction of labor with preeclampsia without severe features diagnosed on admission  Her pregnancy was otherwise uncomplicated  On exam she was noted to be 2/70/-1  She was started on pitocin infusion for induction management  Patient had AROM completed with clear fluid appreciated and received an epidural for analgesia  Patient made steady cervical change to complete and began pushing  She delivered a viable female  on 22 at 0146 via spontaneous vaginal delivery  Apgars were 8 (1 min) and 9 (5 min)   was transferred to  nursery  Patient tolerated the procedure well and was transferred to recovery in stable condition  Her post-partum course was uncomplicated  Her post-partum pain was well controlled with oral analgesics  On day of discharge, she was ambulating and able to reasonably perform all ADLs  She was voiding and had appropriate bowel function  Pain was well controlled  She was discharged home on post-partum day #1 without complications  Patient was instructed to follow up with her OB as an outpatient and was given appropriate warnings to call provider if she develops signs of infection or uncontrolled pain  Complications: none apparent    Condition at discharge: good     Discharge instructions/Information to patient and family:   See after visit summary for information provided to patient and family        Provisions for Follow-Up Care:  See after visit summary for information related to follow-up care and any pertinent home health orders  Disposition: Home    Planned Readmission: No    Discharge Medications: For a complete list of the patient's medications, please refer to her med rec

## 2022-11-24 NOTE — L&D DELIVERY NOTE
Vaginal Delivery Summary - OB/GYN   Anselmo Capone 32 y o  female MRN: [id number]  Unit/Bed#:  206-01 Encounter: [id number]      Pre-delivery Diagnosis:   1  Pregnancy at 37w4d   2  Preeclampsia    Post-delivery Diagnosis: same, delivered    Procedure: Spontaneous Vaginal Delivery    Attending: Dr Saw Nye    Assistant(s): Dr Loco Orozco    Anesthesia: Epidural    QBL: 02XF    Complications: none apparent    Specimens:   1  Arterial and venous cord gases  2  Cord blood  3  Segment of umbilical cord  4  Placenta to storage     Findings:  1  Viable female on 2022 at 0146, with APGARS of 8 and 9 at 1 and 5 minutes respectively,  2  Spontaneous delivery of intact placenta at 0148  3  No apparent lacerations  4  Blood gases:  Umbilical Artery  Recent Labs     22  0153   PHCART 7 347   BECART -2 4*       Umbilical Vein  Recent Labs     22   PHCVEN 7 381   BECVEN -4 0*       Disposition:  Patient tolerated the procedure well and was recovering in labor and delivery room       Brief history and labor course:  Ms Anselmo Capone is a 32 y o   at 37w4d  She presented to labor and delivery for scheduled induction of labor with preeclampsia without severe features diagnosed on admission  Her pregnancy was otherwise uncomplicated  On exam she was noted to be 2/70/-1  She was started on pitocin infusion for induction management  Patient had AROM completed with clear fluid appreciated and received an epidural for analgesia  Patient made steady cervical change to complete and began pushing  Description of procedure:  After pushing for 2 minutes, at 0146 patient delivered a viable female , wt pending, apgars of 8 (1 min) and 9 (5 min)  The fetal vertex delivered spontaneously  There was no nuchal cord  The right anterior shoulder delivered atraumatically with maternal expulsive forces and the assistance of gentle downward traction   The left posterior shoulder delivered with maternal expulsive forces and the assistance of gentle upward traction  The remainder of the fetus delivered spontaneously  Upon delivery, the infant was placed on the mothers abdomen and the cord was doubly clamped and cut after >30 seconds  The infant was noted to cry spontaneously and was moving all extremities appropriately  There was no evidence for injury  Awaiting nurse resuscitators evaluated the   Arterial and venous cord blood gases and cord blood was collected for analysis  These were promptly sent to the lab  In the immediate post-partum, 30 units of IV pitocin was administered, and the uterus was noted to contract down well with massage and pitocin  The placenta delivered spontaneously at [address] [facility] and was noted to have a eccentrally inserted 3 vessel cord  The vagina, cervix, perineum, and rectum were inspected and there was noted to be an intact perineum without apparent lacerations  At the conclusion of the procedure, all needle, sponge, and instrument counts were noted to be correct  Patient tolerated the procedure well and was allowed to recover in labor and delivery room with family and  before being transferred to the post-partum floor  Dr Randolph Melendez was present and participated in all key portions of the case        Ryan Nicholas MD  OB/GYN PGY-2  2022  2:02 AM

## 2022-11-24 NOTE — PLAN OF CARE
Problem: POSTPARTUM  Goal: Experiences normal postpartum course  Description: INTERVENTIONS:  - Monitor maternal vital signs  - Assess uterine involution and lochia  Outcome: Progressing  Goal: Appropriate maternal -  bonding  Description: INTERVENTIONS:  - Identify family support  - Assess for appropriate maternal/infant bonding   -Encourage maternal/infant bonding opportunities  - Referral to  or  as needed  Outcome: Progressing  Goal: Establishment of infant feeding pattern  Description: INTERVENTIONS:  - Assess breast/bottle feeding  - Refer to lactation as needed  Outcome: Progressing  Goal: Incision(s), wounds(s) or drain site(s) healing without S/S of infection  Description: INTERVENTIONS  - Assess and document dressing, incision, wound bed, drain sites and surrounding tissue  Outcome: Progressing     Problem: PAIN - ADULT  Goal: Verbalizes/displays adequate comfort level or baseline comfort level  Description: Interventions:  - Encourage patient to monitor pain and request assistance  - Assess pain using appropriate pain scale  - Administer analgesics based on type and severity of pain and evaluate response  - Implement non-pharmacological measures as appropriate and evaluate response  - Consider cultural and social influences on pain and pain management  - Notify physician/advanced practitioner if interventions unsuccessful or patient reports new pain  Outcome: Progressing     Problem: INFECTION - ADULT  Goal: Absence or prevention of progression during hospitalization  Description: INTERVENTIONS:  - Assess and monitor for signs and symptoms of infection  - Monitor lab/diagnostic results  - Monitor all insertion sites, i e  indwelling lines, tubes, and drains  - Monitor endotracheal if appropriate and nasal secretions for changes in amount and color  - Joice appropriate cooling/warming therapies per order  - Administer medications as ordered  - Instruct and encourage patient and family to use good hand hygiene technique  - Identify and instruct in appropriate isolation precautions for identified infection/condition  Outcome: Progressing  Goal: Absence of fever/infection during neutropenic period  Description: INTERVENTIONS:  - Monitor WBC    Outcome: Progressing     Problem: SAFETY ADULT  Goal: Patient will remain free of falls  Description: INTERVENTIONS:  - Educate patient/family on patient safety including physical limitations  - Instruct patient to call for assistance with activity   - Consult OT/PT to assist with strengthening/mobility   - Keep Call bell within reach  - Keep bed low and locked with side rails adjusted as appropriate  - Keep care items and personal belongings within reach  - Initiate and maintain comfort rounds  - Apply yellow socks and bracelet for high fall risk patients  - Consider moving patient to room near nurses station  Outcome: Progressing  Goal: Maintain or return to baseline ADL function  Description: INTERVENTIONS:  -  Assess patient's ability to carry out ADLs; assess patient's baseline for ADL function and identify physical deficits which impact ability to perform ADLs (bathing, care of mouth/teeth, toileting, grooming, dressing, etc )  - Assess/evaluate cause of self-care deficits   - Assess range of motion  - Assess patient's mobility; develop plan if impaired  - Assess patient's need for assistive devices and provide as appropriate  - Encourage maximum independence but intervene and supervise when necessary  - Involve family in performance of ADLs  - Assess for home care needs following discharge   - Consider OT consult to assist with ADL evaluation and planning for discharge  - Provide patient education as appropriate  Outcome: Progressing  Goal: Maintains/Returns to pre admission functional level  Description: INTERVENTIONS:  - Perform BMAT or MOVE assessment daily    - Set and communicate daily mobility goal to care team and patient/family/caregiver     - Collaborate with rehabilitation services on mobility goals if consulted  - Out of bed for toileting  - Record patient progress and toleration of activity level   Outcome: Progressing     Problem: DISCHARGE PLANNING  Goal: Discharge to home or other facility with appropriate resources  Description: INTERVENTIONS:  - Identify barriers to discharge w/patient and caregiver  - Arrange for needed discharge resources and transportation as appropriate  - Identify discharge learning needs (meds, wound care, etc )  - Arrange for interpretive services to assist at discharge as needed  - Refer to Case Management Department for coordinating discharge planning if the patient needs post-hospital services based on physician/advanced practitioner order or complex needs related to functional status, cognitive ability, or social support system  Outcome: Progressing

## 2022-11-24 NOTE — ANESTHESIA PROCEDURE NOTES
Epidural Block    Patient location during procedure: OB  Start time: 11/23/2022 10:56 PM  Reason for block: procedure for pain and at surgeon's request  Staffing  Performed: CRNA   Anesthesiologist: Jen Ellison MD  Resident/CRNA: Javi Jones CRNA  Preanesthetic Checklist  Completed: patient identified, IV checked, site marked, risks and benefits discussed, surgical consent, monitors and equipment checked, pre-op evaluation and timeout performed  Epidural  Patient position: sitting  Prep: ChloraPrep  Patient monitoring: cardiac monitor and frequent blood pressure checks  Approach: midline  Location: lumbar  Injection technique: GLENNY saline  Needle  Needle type: Tuohy   Needle gauge: 18 G  Catheter type: side hole  Catheter size: 20 G  Catheter at skin depth: 12 cm  Catheter securement method: clear occlusive dressing  Test dose: negativenegative aspiration for CSF, negative aspiration for heme and no paresthesia on injection  patient tolerated the procedure well with no immediate complications

## 2022-11-25 VITALS
TEMPERATURE: 98.2 F | OXYGEN SATURATION: 98 % | HEART RATE: 82 BPM | SYSTOLIC BLOOD PRESSURE: 133 MMHG | DIASTOLIC BLOOD PRESSURE: 89 MMHG | RESPIRATION RATE: 18 BRPM

## 2022-11-25 RX ORDER — DOCUSATE SODIUM 100 MG/1
100 CAPSULE, LIQUID FILLED ORAL 2 TIMES DAILY
Refills: 0
Start: 2022-11-25

## 2022-11-25 RX ORDER — CALCIUM CARBONATE 200(500)MG
1000 TABLET,CHEWABLE ORAL 2 TIMES DAILY PRN
Refills: 0
Start: 2022-11-25

## 2022-11-25 RX ORDER — IBUPROFEN 600 MG/1
600 TABLET ORAL EVERY 6 HOURS PRN
Qty: 30 TABLET | Refills: 0 | Status: SHIPPED | OUTPATIENT
Start: 2022-11-25

## 2022-11-25 RX ORDER — ACETAMINOPHEN 325 MG/1
650 TABLET ORAL EVERY 6 HOURS PRN
Refills: 0
Start: 2022-11-25

## 2022-11-25 RX ADMIN — IBUPROFEN 600 MG: 600 TABLET, FILM COATED ORAL at 11:02

## 2022-11-25 NOTE — PLAN OF CARE
Problem: POSTPARTUM  Goal: Experiences normal postpartum course  Description: INTERVENTIONS:  - Monitor maternal vital signs  - Assess uterine involution and lochia  2022 1326 by Cesar Harris RN  Outcome: Completed  2022 1246 by Cesar Harris RN  Outcome: Adequate for Discharge  Goal: Appropriate maternal -  bonding  Description: INTERVENTIONS:  - Identify family support  - Assess for appropriate maternal/infant bonding   -Encourage maternal/infant bonding opportunities  - Referral to  or  as needed  2022 1326 by Cesar Harris RN  Outcome: Completed  2022 1246 by Cesar Harris RN  Outcome: Adequate for Discharge  Goal: Establishment of infant feeding pattern  Description: INTERVENTIONS:  - Assess breast/bottle feeding  - Refer to lactation as needed  2022 1326 by Cesar Harris RN  Outcome: Completed  2022 1246 by Cesar Hraris RN  Outcome: Adequate for Discharge  Goal: Incision(s), wounds(s) or drain site(s) healing without S/S of infection  Description: INTERVENTIONS  - Assess and document dressing, incision, wound bed, drain sites and surrounding tissue  - Provide patient and family education  - Perform skin care/dressing changes every  2022 1326 by Cesar Harris RN  Outcome: Completed  2022 1246 by Cesar Harris RN  Outcome: Adequate for Discharge

## 2022-11-25 NOTE — LACTATION NOTE
This note was copied from a baby's chart  Met with mother to go over discharge breastfeeding booklet including the feeding log  Emphasized 8 or more (12) feedings in a 24 hour period, what to expect for the number of diapers per day of life and the progression of properties of the  stooling pattern  Reviewed breastfeeding and your lifestyle, storage and preparation of breast milk, how to keep you breast pump clean, the employed breastfeeding mother and paced bottle feeding handouts  Booklet included Breastfeeding Resources for after discharge including access to the number for the [address]  Discussed this as the best resource to contact for questions or concerns regarding breasts,  feedings, and breastmilk  Discussed s/s engorgement and how to manage with medications, additional feedings at the breast or pumping sessions as needed, and cool compresses as well as s/s and management of mastitis and when to contact physician  Reviewed booklet and feeding log, addressed questions related to DC teaching  Enc family to continue to feed the baby on demand, look for signs of effective breastfeed like audible swallows, strong but comfortable tugging while latched, breasts softening (after milk comes in), baby falling asleep and releasing the breast, and meeting daily diaper goals  Assisted parents to place baby skin to skin in football hold  Discussed importance of alignment of baby's ear, shoulder, and hip in any preferred position  Worked on supporting baby at breast level and beginning the feed with baby's nose arriving at the nipple  Then, using areolar compression while guiding baby chin-forward to the breast to achieve a deep, comfortable latch  Baby latches, Mom reports increased comfort with positioning       Reviewed signs of effective breastfeeding: audible swallows, strong but comfortable tugging while latched, breasts softening (after milk comes in), baby falling asleep and releasing the breast, and meeting daily diaper goals

## 2022-11-25 NOTE — PLAN OF CARE
Problem: POSTPARTUM  Goal: Experiences normal postpartum course  Description: INTERVENTIONS:  - Monitor maternal vital signs  - Assess uterine involution and lochia  Outcome: Adequate for Discharge  Goal: Appropriate maternal -  bonding  Description: INTERVENTIONS:  - Identify family support  - Assess for appropriate maternal/infant bonding   -Encourage maternal/infant bonding opportunities  - Referral to  or  as needed  Outcome: Adequate for Discharge  Goal: Establishment of infant feeding pattern  Description: INTERVENTIONS:  - Assess breast/bottle feeding  - Refer to lactation as needed  Outcome: Adequate for Discharge  Goal: Incision(s), wounds(s) or drain site(s) healing without S/S of infection  Description: INTERVENTIONS  - Assess and document dressing, incision, wound bed, drain sites and surrounding tissue  - Provide patient and family education  - Perform skin care/dressing changes every   Outcome: Adequate for Discharge     Problem: POSTPARTUM  Goal: Experiences normal postpartum course  Description: INTERVENTIONS:  - Monitor maternal vital signs  - Assess uterine involution and lochia  Outcome: Adequate for Discharge  Goal: Appropriate maternal -  bonding  Description: INTERVENTIONS:  - Identify family support  - Assess for appropriate maternal/infant bonding   -Encourage maternal/infant bonding opportunities  - Referral to  or  as needed  Outcome: Adequate for Discharge  Goal: Establishment of infant feeding pattern  Description: INTERVENTIONS:  - Assess breast/bottle feeding  - Refer to lactation as needed  Outcome: Adequate for Discharge  Goal: Incision(s), wounds(s) or drain site(s) healing without S/S of infection  Description: INTERVENTIONS  - Assess and document dressing, incision, wound bed, drain sites and surrounding tissue  - Provide patient and family education  - Perform skin care/dressing changes every       Outcome: Adequate for Discharge

## 2022-11-25 NOTE — UTILIZATION REVIEW
NOTIFICATION OF INPATIENT ADMISSION   MATERNITY/DELIVERY AUTHORIZATION REQUEST   SERVICING FACILITY:   Critical access hospital - L&D, , NICU  [address] [facility] [place], [address] [place] [address]  Tax ID: [id number]  NPI: [id number]   ATTENDING PROVIDER:  Attending Name and NPI#: Venita Wylie [[id number]]  Address: [address] [place] [address] [facility] [place], [address] [place] [address]  Phone: [phone]   ADMISSION INFORMATION:  Place of Service: Inpatient [address] [facility]  [address]  Place of Service Code: 21  Inpatient Admission Date/Time: 22  3:27 PM  Discharge Date/Time: No discharge date for patient encounter  Admitting Diagnosis Code/Description:  Encounter for elective induction of labor [Z34 90]  Encounter for full-term uncomplicated delivery [R19]     Mother: Maricruz Rosales 1995 Estimated Date of Delivery: 22  Delivering clinician: Tatiana Mathew    OB History        2    Para   2    Term   1       1    AB   0    Living   2       SAB   0    IAB   0    Ectopic   0    Multiple   0    Live Births   2             Mehama Name & MRN:   Information for the patient's :  Ritchie Galvan [place]) [[id number]]     Mehama Delivery Information:  Sex: female  Delivered 2022 1:46 AM by Vaginal, Spontaneous; Gestational Age: 37w1d     Measurements:  Weight: 5 lb 15 2 oz (2700 g); Height: 18"    APGAR 1 minute 5 minutes 10 minutes   Totals: 8 9      Mehama Birth Information: 32 y o  female MRN: [id number] Unit/Bed#: -01   Birthweight: No birth weight on file  Gestational Age: <None> Delivery Type:    APGARS Totals:        UTILIZATION REVIEW CONTACT:  Lidia Baum Utilization   Network Utilization Review Department  Phone: [phone]  Fax [phone]  Email: [email or web address]  Contact for approvals/pending authorizations, clinical reviews, and discharge       PHYSICIAN ADVISORY SERVICES:  Medical Necessity Denial & S[facility] Review  Phone: [phone]  Fax: [phone]  Email: [email or web address]

## 2022-11-25 NOTE — PLAN OF CARE
Problem: POSTPARTUM  Goal: Experiences normal postpartum course  Description: INTERVENTIONS:  - Monitor maternal vital signs  - Assess uterine involution and lochia  Outcome: Progressing  Goal: Appropriate maternal -  bonding  Description: INTERVENTIONS:  - Identify family support  - Assess for appropriate maternal/infant bonding   -Encourage maternal/infant bonding opportunities  - Referral to  or  as needed  Outcome: Progressing  Goal: Establishment of infant feeding pattern  Description: INTERVENTIONS:  - Assess breast/bottle feeding  - Refer to lactation as needed  Outcome: Progressing  Goal: Incision(s), wounds(s) or drain site(s) healing without S/S of infection  Description: INTERVENTIONS  - Assess and document dressing, incision, wound bed, drain sites and surrounding tissue  - Provide patient and family education  - Outcome: Progressing     Problem: PAIN - ADULT  Goal: Verbalizes/displays adequate comfort level or baseline comfort level  Description: Interventions:  - Encourage patient to monitor pain and request assistance  - Assess pain using appropriate pain scale  - Administer analgesics based on type and severity of pain and evaluate response  - Implement non-pharmacological measures as appropriate and evaluate response  - Consider cultural and social influences on pain and pain management  - Notify physician/advanced practitioner if interventions unsuccessful or patient reports new pain  Outcome: Progressing     Problem: INFECTION - ADULT  Goal: Absence or prevention of progression during hospitalization  Description: INTERVENTIONS:  - Assess and monitor for signs and symptoms of infection  - Monitor lab/diagnostic results  - Monitor all insertion sites, i e  indwelling lines, tubes, and drains  - Monitor endotracheal if appropriate and nasal secretions for changes in amount and color  - [place] appropriate cooling/warming therapies per order  - Administer medications as ordered  - Instruct and encourage patient and family to use good hand hygiene technique  - Identify and instruct in appropriate isolation precautions for identified infection/condition  Outcome: Progressing  Goal: Absence of fever/infection during neutropenic period  Description: INTERVENTIONS:  - Monitor WBC    Outcome: Progressing     Problem: SAFETY ADULT  Goal: Patient will remain free of falls  Description: INTERVENTIONS:  - Educate patient/family on patient safety including physical limitations  - Instruct patient to call for assistance with activity   - Consult OT/PT to assist with strengthening/mobility   - Keep Call bell within reach  - Keep bed low and locked with side rails adjusted as appropriate  - Keep care items and personal belongings within reach  - Initiate and maintain comfort rounds  - Make Fall Risk Sign visible to staff  - Apply yellow socks and bracelet for high fall risk patients  - Consider moving patient to room near nurses station  Outcome: Progressing  Goal: Maintain or return to baseline ADL function  Description: INTERVENTIONS:  -  Assess patient's ability to carry out ADLs; assess patient's baseline for ADL function and identify physical deficits which impact ability to perform ADLs (bathing, care of mouth/teeth, toileting, grooming, dressing, etc )  - Assess/evaluate cause of self-care deficits   - Assess range of motion  - Assess patient's mobility; develop plan if impaired  - Assess patient's need for assistive devices and provide as appropriate  - Encourage maximum independence but intervene and supervise when necessary  - Involve family in performance of ADLs  - Assess for home care needs following discharge   - Consider OT consult to assist with ADL evaluation and planning for discharge  - Provide patient education as appropriate  Outcome: Progressing  Goal: Maintains/Returns to pre admission functional level  Description: INTERVENTIONS:  - Perform BMAT or MOVE assessment daily    - Set and communicate daily mobility goal to care team and patient/family/caregiver     - Collaborate with rehabilitation services on mobility goals if consulted  -- Out of bed for toileting  - Record patient progress and toleration of activity level   Outcome: Progressing     Problem: DISCHARGE PLANNING  Goal: Discharge to home or other facility with appropriate resources  Description: INTERVENTIONS:  - Identify barriers to discharge w/patient and caregiver  - Arrange for needed discharge resources and transportation as appropriate  - Identify discharge learning needs (meds, wound care, etc )  - Arrange for interpretive services to assist at discharge as needed  - Refer to Case Management Department for coordinating discharge planning if the patient needs post-hospital services based on physician/advanced practitioner order or complex needs related to functional status, cognitive ability, or social support system  Outcome: Progressing

## 2022-11-25 NOTE — PROGRESS NOTES
Progress Note - OB/GYN  Adolfo Fairbanks 32 y o  female MRN: [id number]  Unit/Bed#: -01 Encounter: [id number]    Assessment and Plan     Adolfo Fairbanks is a patient of: [facility]   She is PPD# 1 s/p  spontaneous vaginal delivery  Recovering well and is stable       Preeclampsia without severe features  Assessment & Plan  Systolic (50SKD), HBS:148 , Min:110 , WS   Diastolic (92JNX), RJJ:33, Min:65, Max:85      Urine PC 0 3    Asymptomatic on PPD#1    *  (spontaneous vaginal delivery)  Assessment & Plan  Lochia WNL   Recovering well   Appropriate bowel and bladder function   Pain well controlled   Tolerating diet   Breastfeeding  Ambulating without issues   No lower extremity tenderness  GBS negative  Rh positive        Disposition    - Anticipate discharge home on PPD# 1, pending baby      Subjective/Objective     Chief Complaint: Postpartum State     Subjective:    Adolfo Fairbanks is PPD/POD#1 s/p  spontaneous vaginal delivery  She has no current complaints  Pain is well controlled  Patient is currently voiding  She is ambulating  Patient is currently passing flatus and has had bowel movement  She is tolerating PO, and denies nausea or vomitting  Patient denies fever, chills, chest pain, shortness of breath, or calf tenderness  Lochia is minimal  She is  Breastfeeding and Using breast pump  She is recovering well and is stable         Vitals:   /85 (BP Location: Right arm)   Pulse 71   Temp 97 5 °F (36 4 °C) (Oral)   Resp 18   LMP 2022   SpO2 98%   Breastfeeding Yes     No intake or output data in the 24 hours ending 22 0609    Invasive Devices     None                 Physical Exam:   GEN: Adolfo Fairbanks appears well, alert and oriented x 3, pleasant and cooperative   CARDIO: RRR, no murmurs or rubs  RESP:  CTAB, no wheezes or rales  ABDOMEN: soft, no tenderness, no distention, fundus @ u-1  EXTREMITIES: SCDs on, non tender, no erythema      Labs:     Hemoglobin   Date Value Ref Range Status   11/23/2022 13 5 11 5 - 15 4 g/dL Final   11/18/2022 13 8 11 1 - 15 9 g/dL Final   09/30/2022 13 3 11 1 - 15 9 g/dL Final   04/01/2021 13 5 11 5 - 15 4 g/dL Final     WBC   Date Value Ref Range Status   11/23/2022 9 59 4 31 - 10 16 Thousand/uL Final   04/01/2021 9 25 4 31 - 10 16 Thousand/uL Final     White Blood Cell Count   Date Value Ref Range Status   11/18/2022 7 8 3 4 - 10 8 x10E3/uL Final   09/30/2022 8 6 3 4 - 10 8 x10E3/uL Final     Platelet Count   Date Value Ref Range Status   11/18/2022 181 150 - 450 x10E3/uL Final   09/30/2022 201 150 - 450 x10E3/uL Final     Platelets   Date Value Ref Range Status   11/23/2022 189 149 - 390 Thousands/uL Final   04/01/2021 196 149 - 390 Thousands/uL Final     Creatinine   Date Value Ref Range Status   11/24/2022 0 54 (L) 0 60 - 1 30 mg/dL Final     Comment:     Standardized to IDMS reference method   11/18/2022 0 55 (L) 0 57 - 1 00 mg/dL Final     AST   Date Value Ref Range Status   11/24/2022 12 (L) 13 - 39 U/L Final     Comment:     Specimen collection should occur prior to Sulfasalazine administration due to the potential for falsely depressed results  11/18/2022 15 0 - 40 IU/L Final     ALT   Date Value Ref Range Status   11/24/2022 8 7 - 52 U/L Final     Comment:     Specimen collection should occur prior to Sulfasalazine administration due to the potential for falsely depressed results      11/18/2022 9 0 - 32 IU/L Final          Gia Leung DO  11/25/2022  6:09 AM

## 2022-11-25 NOTE — ASSESSMENT & PLAN NOTE
Lochia WNL   Recovering well   Appropriate bowel and bladder function   Pain well controlled   Tolerating diet   Breastfeeding  Ambulating without issues   No lower extremity tenderness  GBS negative  Rh positive

## 2022-11-28 NOTE — UTILIZATION REVIEW
NOTIFICATION OF ADMISSION DISCHARGE   This is a Notification of Discharge from [address]  Please be advised that this patient has been discharge from our facility  Below you will find the admission and discharge date and time including the patient’s disposition  UTILIZATION REVIEW CONTACT:  Curvin Rico  Utilization   Network Utilization Review Department  Phone: [phone] x carefully listen to the prompts  All voicemails are confidential   Email: [email or web address]     ADMISSION INFORMATION  PRESENTATION DATE: 11/23/2022  2:49 PM  OBERVATION ADMISSION DATE:   INPATIENT ADMISSION DATE: 11/23/22  2:49 PM   DISCHARGE DATE: 11/25/2022  2:00 PM   DISPOSITION:Home/Self Care    IMPORTANT INFORMATION:  Send all requests for admission clinical reviews, approved or denied determinations and any other requests to dedicated fax number below belonging to the campus where the patient is receiving treatment   List of dedicated fax numbers:  [address] DENIALS (Administrative/Medical Necessity) [phone]   [address] (Maternity/NICU/Pediatrics) [phone]   [facility] [phone]   [facility] [phone]   [address] [phone]   220 [facility] [phone] 90 [facility] [phone]   [address] [place] [address] [phone]   [facility]  [phone]   405 [facility] [phone]   412 [facility] [address] [facility] [phone]

## 2022-11-30 LAB — PLACENTA IN STORAGE: NORMAL

## 2022-12-01 ENCOUNTER — POSTPARTUM VISIT (OUTPATIENT)
Dept: OBGYN CLINIC | Facility: CLINIC | Age: 27
End: 2022-12-01

## 2022-12-01 VITALS
DIASTOLIC BLOOD PRESSURE: 88 MMHG | WEIGHT: 177 LBS | SYSTOLIC BLOOD PRESSURE: 124 MMHG | HEIGHT: 63 IN | BODY MASS INDEX: 31.36 KG/M2

## 2022-12-01 NOTE — PROGRESS NOTES
Jose Jackson is a 32 y o   female here for a postpartum blood pressure check  Patient is 1 week postpartum following induction and vaginal delivery for preeclampsia without severe features  Lab values at admission were normal   Patient had vaginal delivery without significant complications  Patient reports lochia is light, no headaches or scotomata or edema  Patient is tolerating regular diet voiding and bowel without difficulty  Breasts were initially sore but baby is nursing well and sleeping 3 hours at a time and no further concerns regarding breast      Gynecologic History  Patient's last menstrual period was 2022  Contraception: Will consider options  Last Pap:  2020  Results were: normal      Obstetric History  OB History    Para Term  AB Living   2 2 1 1 0 2   SAB IAB Ectopic Multiple Live Births   0 0 0 0 2      # Outcome Date GA Lbr Osvaldo/2nd Weight Sex Delivery Anes PTL Lv   2 Term 22 37w4d / 00:04 2700 g (5 lb 15 2 oz) F Vag-Spont EPI N AAKASH   1  21 34w5d / 00:26 2155 g (4 lb 12 oz) F Vag-Spont EPI Y AAKASH         The following portions of the patient's history were reviewed and updated as appropriate: allergies, current medications, past family history, past medical history, past social history, past surgical history and problem list     Review of Systems  Review of Systems   Constitutional: Negative for fatigue, fever and unexpected weight change  HENT: Negative for dental problem, sinus pressure and sinus pain  Eyes: Negative for visual disturbance  Respiratory: Negative for cough, shortness of breath and wheezing  Cardiovascular: Negative for chest pain and leg swelling  Gastrointestinal: Negative for blood in stool, constipation, diarrhea, nausea and vomiting  Endocrine: Negative for cold intolerance, heat intolerance and polydipsia     Genitourinary: Negative for dysuria, frequency, hematuria, menstrual problem and pelvic pain  Musculoskeletal: Negative for arthralgias and back pain  Neurological: Negative for dizziness, seizures and headaches  Psychiatric/Behavioral: The patient is not nervous/anxious  Objective     /88 (BP Location: Right arm, Patient Position: Sitting)   Ht 5' 3" (1 6 m)   Wt 80 3 kg (177 lb)   LMP 2022   Breastfeeding Yes   BMI 31 35 kg/m²   General appearance: alert and oriented, in no acute distress  Lungs: clear to auscultation bilaterally  Heart: regular rate and rhythm, S1, S2 normal, no murmur, click, rub or gallop  Abdomen: soft, non-tender; bowel sounds normal; no masses,  no organomegaly and Fundus firm nontender U minus 3  Extremities: extremities normal, warm and well-perfused; no cyanosis, clubbing, or edema      Assessment  32year-old  delivered at 37 weeks for preeclampsia without severe features steadily recovering       Plan  Return in 3 weeks for postpartum check or sooner as needed

## 2022-12-23 ENCOUNTER — POSTPARTUM VISIT (OUTPATIENT)
Dept: OBGYN CLINIC | Facility: CLINIC | Age: 27
End: 2022-12-23

## 2022-12-23 VITALS
DIASTOLIC BLOOD PRESSURE: 84 MMHG | SYSTOLIC BLOOD PRESSURE: 130 MMHG | WEIGHT: 179 LBS | HEIGHT: 63 IN | BODY MASS INDEX: 31.71 KG/M2

## 2022-12-23 NOTE — PROGRESS NOTES
Postpartum Visit  Lizbeth Serrano MD    22    Homa Tran is a 32 y o   female who presents for a postpartum visit  She is s/p  at 37w4d on 22  Oralee Regino)    She delivered a female   Baby's course has been NA  Baby is feeding by breast    Laceration: NA    Lochia is thin lochia  Bowel function is normal    Bladder function is normal    Patient has not been sexually active  Desired contraception method is NFP and condoms until then    [place] score: 4    Gestational Diabetes: NA  Gestational HTN/Preeclampsia: yes--no meds  Pregnancy Complications: NA    The following portions of the patient's history were reviewed and updated as appropriate:   She  has a past medical history of Varicella  Current Outpatient Medications on File Prior to Visit   Medication Sig   • Prenatal Vit-DSS-Fe Cbn-FA (PRENATAL AD PO) Take by mouth   • [DISCONTINUED] acetaminophen (TYLENOL) 325 mg tablet Take 2 tablets (650 mg total) by mouth every 6 (six) hours as needed for mild pain, headaches or fever (Patient not taking: Reported on 2022)   • [DISCONTINUED] benzocaine-menthol-lanolin-aloe (DERMOPLAST) 20-0 5 % topical spray Apply 1 application topically 4 (four) times a day as needed for irritation or mild pain (Patient not taking: Reported on 2022)   • [DISCONTINUED] calcium carbonate (TUMS) 500 mg chewable tablet Chew 2 tablets (1,000 mg total) 2 (two) times a day as needed for indigestion or heartburn (Patient not taking: Reported on 2022)   • [DISCONTINUED] docusate sodium (COLACE) 100 mg capsule Take 1 capsule (100 mg total) by mouth 2 (two) times a day (Patient not taking: Reported on 2022)   • [DISCONTINUED] ibuprofen (MOTRIN) 600 mg tablet Take 1 tablet (600 mg total) by mouth every 6 (six) hours as needed for headaches or mild pain (cramping) (Patient not taking: Reported on 2022)     No current facility-administered medications on file prior to visit       She is allergic to ceclor [cefaclor]         Current Outpatient Medications:   •  Prenatal Vit-DSS-Fe Cbn-FA (PRENATAL AD PO), Take by mouth, Disp: , Rfl:     Allergies   Allergen Reactions   • Ceclor [Cefaclor] Other (See Comments)     Childhood reaction       Review of Systems  Constitutional: no fever, feels well  Breasts: no complaints of breast pain, breast lump, or nipple discharge  Gastrointestinal: no complaints nausea, vomiting  Genitourinary: as noted in HPI  Neurological: no complaints of headache    Objective      /84 (BP Location: Left arm, Patient Position: Sitting, Cuff Size: Standard)   Ht 5' 3" (1 6 m)   Wt 81 2 kg (179 lb)   LMP 2022   Breastfeeding Yes   BMI 31 71 kg/m²   Physical Exam  Constitutional:       Appearance: Normal appearance  HENT:      Head: Normocephalic  Eyes:      Extraocular Movements: Extraocular movements intact  Conjunctiva/sclera: Conjunctivae normal    Pulmonary:      Effort: Pulmonary effort is normal    Abdominal:      General: There is no distension  Palpations: Abdomen is soft  Tenderness: There is no abdominal tenderness  Genitourinary:     Comments: Vulva: normal, no lesions  Vagina: normal, no lesions or ttp  Urethra: normal, no lesions, masses or ttp  Bladder: normal, no masses or ttp  Cervix: normal, no lesions, masses or CMT  Uterus: normal-size, normal mobility, good descensus  Adnexa: no masses or ttp  Musculoskeletal:         General: Normal range of motion  Cervical back: Normal range of motion  Skin:     General: Skin is warm and dry  Neurological:      General: No focal deficit present  Mental Status: She is alert  Psychiatric:         Mood and Affect: Mood normal          Behavior: Behavior normal          Thought Content: Thought content normal            Assessment/Plan:  Stefany Cervantes is a 32 y o  who is postpartum from an  with a normal postpartum examination  1  Contraception: condoms/NFP  2   Annual exam due in next fall; Last Pap : 10/2023   3  [address] information discussed  4  Increase activity as tolerated, may resume all normal activity

## 2025-03-31 NOTE — PROGRESS NOTES
S: 30 y.o.  who presents for viability scan with LMP of 25. She is 9 weeks and 0 days by her LMP. Her menses are regular. She denies cramping or vaginal bleeding. She reports daily nausea but tolerable. This is an unplanned but welcomed pregnancy. Hx of PPROM with PTD in  and hx of Pre-e in .    Past Medical History:   Diagnosis Date    Varicella        OB History    Para Term  AB Living   3 2 1 1 0 2   SAB IAB Ectopic Multiple Live Births   0 0 0 0 2      # Outcome Date GA Lbr Osvaldo/2nd Weight Sex Type Anes PTL Lv   3 Current            2 Term 22 37w4d / 00:04 2700 g (5 lb 15.2 oz) F Vag-Spont EPI N AAKASH   1  21 34w5d / 00:26 2155 g (4 lb 12 oz) F Vag-Spont EPI Y AAKASH        O:  Vitals:    25 1449   BP: 136/86   BP Location: Left arm   Patient Position: Sitting   Cuff Size: Standard   Weight: 78.9 kg (174 lb)       TVUS: viable, carr  IUP, measuring 2.18 cm, correlating with 8w5d. EMMANUEL of 25, based off LMP. .               A/P:  1. Viable pregnancy on TVUS  2. MFM referral placed.  3. RTC in 2 week for nurse intake visit    Problem List Items Addressed This Visit    None  Visit Diagnoses         Positive pregnancy test    -  Primary      9 weeks gestation of pregnancy        Relevant Orders    Ambulatory Referral to Maternal Fetal Medicine

## 2025-04-01 ENCOUNTER — ULTRASOUND (OUTPATIENT)
Dept: OBGYN CLINIC | Facility: CLINIC | Age: 30
End: 2025-04-01
Payer: COMMERCIAL

## 2025-04-01 VITALS — BODY MASS INDEX: 30.82 KG/M2 | WEIGHT: 174 LBS | SYSTOLIC BLOOD PRESSURE: 136 MMHG | DIASTOLIC BLOOD PRESSURE: 86 MMHG

## 2025-04-01 DIAGNOSIS — Z3A.09 9 WEEKS GESTATION OF PREGNANCY: ICD-10-CM

## 2025-04-01 DIAGNOSIS — Z32.01 POSITIVE PREGNANCY TEST: Primary | ICD-10-CM

## 2025-04-01 PROCEDURE — 76817 TRANSVAGINAL US OBSTETRIC: CPT

## 2025-04-01 PROCEDURE — 99213 OFFICE O/P EST LOW 20 MIN: CPT

## 2025-04-14 ENCOUNTER — TELEPHONE (OUTPATIENT)
Dept: OBGYN CLINIC | Facility: CLINIC | Age: 30
End: 2025-04-14

## 2025-04-14 NOTE — TELEPHONE ENCOUNTER
Left message for pt to call back to reschedule appt today at 1 due to nurse not in office.  Offered Wednesday 4/16 at 10:30 or 1 and to let us know which she would like.

## 2025-04-16 ENCOUNTER — INITIAL PRENATAL (OUTPATIENT)
Dept: OBGYN CLINIC | Facility: CLINIC | Age: 30
End: 2025-04-16

## 2025-04-16 ENCOUNTER — PATIENT MESSAGE (OUTPATIENT)
Dept: OBGYN CLINIC | Facility: CLINIC | Age: 30
End: 2025-04-16

## 2025-04-16 VITALS — BODY MASS INDEX: 30.83 KG/M2 | WEIGHT: 174 LBS | HEIGHT: 63 IN

## 2025-04-16 DIAGNOSIS — Z34.01 ENCOUNTER FOR SUPERVISION OF NORMAL FIRST PREGNANCY IN FIRST TRIMESTER: Primary | ICD-10-CM

## 2025-04-16 PROCEDURE — OBC

## 2025-04-16 NOTE — PROGRESS NOTES
OB INTAKE INTERVIEW  Patient is 30 y.o. who presents for OB intake at 11 wks  She is accompanied by herself during this telephone encounter  The father of her baby (Juan Pablo Leigh) is involved in the pregnancy and is 37 years old.      Last Menstrual Period: 25  Ultrasound: Measured 8 weeks 5 days on   Estimated Date of Delivery: 25 confirmed by 8 week US    Signs/Symptoms of Pregnancy  Current pregnancy symptoms: nausea (starting to get better), fatigued  Constipation no  Headaches no  Cramping/spotting no  PICA cravings no    Diabetes-  Body mass index is 30.82 kg/m².  If patient has 1 or more, please order early 1 hour GTT  History of GDM no  BMI >35 no  History of PCOS or current metformin use (should stop for 7 days prior to 1hr GTT unless pre-existing diabetes)  no  History of LGA/macrosomic infant (4000g/9lbs) no    If patient has 2 or more, please order early 1 hour GTT  BMI>30 YES  AMA no  First degree relative with type 2 diabetes no  History of chronic HTN, hyperlipidemia, elevated A1C no  High risk race (, , ,  or ) no    Hypertension- if you answer yes to any of the following, please order baseline preeclampsia labs (cbc, comprehensive metabolic panel, urine protein creatinine ratio, uric acid)  History of of chronic HTN no  History of gestational HTN no  History of preeclampsia, eclampsia, or HELLP syndrome YES  History of diabetes no  History of lupus,sjogrens syndrome, kidney disease no    Thyroid- if yes order TSH with reflex T4  History of thyroid disease no    Bleeding Disorder or Hx of DVT-patient or first degree relative with history of. Order the following if not done previously.   (Factor V, antithrombin III, prothrombin gene mutation, protein C and S Ag, lupus anticoagulant, anticardiolipin, beta-2 glycoprotein)   no    OB/GYN-  History of abnormal pap smear no       Date of last pap smear 10/14/2020  History of HPV  no  History of Herpes/HSV no  History of other STI (gonorrhea, chlamydia, trich) no  History of prior  YES  History of prior  no  History of  delivery prior to 36 weeks 6 days YES  History of Varicella or Vaccination had disease  History of blood transfusion no  Ok for blood transfusion YES    Substance screening-   History of tobacco use no  Currently using tobacco no  Substance Use Screen Level (N/A, LOW, HIGH) NA    MRSA Screening-   Does the pt have a hx of MRSA? no    Immunizations:  Influenza vaccine given this season No, declined  Discussed Tdap vaccine yes  Discussed COVID Vaccine no    Genetic/MFM-  Do you or your partner have a history of any of the following in yourselves or first degree relatives?  Cystic fibrosis no  Spinal muscular atrophy no  Hemoglobinopathy/Sickle Cell/Thalassemia no  Fragile X Intellectual Disability no    If yes, discuss Carrier Screening and recommend consultation with [facility]/Genetic Counseling and place specific [facility] Referral for.    If no, discuss Carrier Screening being completed once in a lifetime as a standard of care lab test. Place orders for Cystic Fibrosis Gene Test (BKD767) and Spinal Muscular Atrophy DNA ([id number])      Appointment for Nuchal Translucency Ultrasound at [facility] scheduled for declined NT anatom scan scheduled for       Interview education  [facility] Pregnancy Essentials Book reviewed, discussed and attached to their AVS yes    Nurse/Family Partnership- patient may qualify no; referral placed no    Prenatal lab work scripts yes  Extra labs ordered:  preE    Aspirin/Preeclampsia Screen    Risk Level Risk Factor Recommendation   LOW Prior Uncomplicated full-term delivery YES No Aspirin recommendation        MODERATE Nulliparity no Recommend low-dose aspirin if     BMI>30 YES 2 or more moderate risk factors    Family History Preeclampsia (mother/sister) no     35yr old or greater no     Black Race, Concern for SDOH/Low Socioeconomic no     IVF  Pregnancy  no     Personal History Risks (low birth weight, prior adverse preg outcome, >10yr preg interval) no         HIGH History of Preeclampsia YES Recommend low-dose aspirin if     Multifetal gestation no 1 or more high risk factors    Chronic HTN no     Type 1 or 2 Diabetes no     Renal Disease no     Autoimmune Disease  no      Contraindications to ASA therapy:  NSAID/ ASA allergy: no  Nasal polyps: no  Asthma with history of ASA induced bronchospasm: no  Relative contraindications:  History of GI bleed: no  Active peptic ulcer disease: no  Severe hepatic dysfunction: no    Patient should be recommended to take ASA 162mg during this pregnancy from 12-36wks to lower her risk of preeclampsia: High Risk criteria met- pt aware to start ASA therapy at 12 weeks.           The patient has a history now or in prior pregnancy notable for:  pre-clampsia,  labor, and EPDS- 6      Details that I feel the provider should be aware of: This is a unplanned and welcomed pregnancy for Martine and her significant other Juan Pablo.  She is a previous patient to [facility]. This is their 3rd child. She has a history of PTD at 34W for PPROM and then and IOL at 37w for preE. Both girls were born via . She is overall feeling well so far, some nausea and fatigue that is starting to resolve. Patient is aware of PN1 labs and to have them completed before her next appointment. Carrier screening discussed patient declined. Blue folder was NOT given and reviewed today as this was a telephone encounter.     PN1 visit scheduled. The patient was oriented to our practice, the navigator role, reviewed delivering physicians and [facility] for Delivery. All questions were answered.    Interviewed by: Marta Sosa RN

## 2025-04-16 NOTE — PATIENT INSTRUCTIONS
Congratulations!! Please review our Pregnancy Essential Guide and [facility] L&D Virtual tour from our networks website.     [facility] Pregnancy Essentials Guide  [facility] ([email or web address])     [facility] - Virtual Tour ([employer])         Check out “Baby [facility] Me Hospital Readiness Class” from [facility] on Nano.   The video is available for your viewing pleasure at [email or web address]

## 2025-04-22 ENCOUNTER — LAB (OUTPATIENT)
Dept: LAB | Facility: CLINIC | Age: 30
End: 2025-04-22
Payer: COMMERCIAL

## 2025-04-22 DIAGNOSIS — Z34.01 ENCOUNTER FOR SUPERVISION OF NORMAL FIRST PREGNANCY IN FIRST TRIMESTER: ICD-10-CM

## 2025-04-22 LAB
ABO GROUP BLD: NORMAL
ALBUMIN SERPL BCG-MCNC: 3.8 G/DL (ref 3.5–5)
ALP SERPL-CCNC: 57 U/L (ref 34–104)
ALT SERPL W P-5'-P-CCNC: 15 U/L (ref 7–52)
ANION GAP SERPL CALCULATED.3IONS-SCNC: 8 MMOL/L (ref 4–13)
AST SERPL W P-5'-P-CCNC: 13 U/L (ref 13–39)
BASOPHILS # BLD AUTO: 0.03 THOUSANDS/ÂΜL (ref 0–0.1)
BASOPHILS NFR BLD AUTO: 0 % (ref 0–1)
BILIRUB SERPL-MCNC: 0.3 MG/DL (ref 0.2–1)
BILIRUB UR QL STRIP: NEGATIVE
BLD GP AB SCN SERPL QL: NEGATIVE
BUN SERPL-MCNC: 11 MG/DL (ref 5–25)
CALCIUM SERPL-MCNC: 9.1 MG/DL (ref 8.4–10.2)
CHLORIDE SERPL-SCNC: 104 MMOL/L (ref 96–108)
CLARITY UR: CLEAR
CO2 SERPL-SCNC: 25 MMOL/L (ref 21–32)
COLOR UR: NORMAL
CREAT SERPL-MCNC: 0.64 MG/DL (ref 0.6–1.3)
CREAT UR-MCNC: 72.1 MG/DL
EOSINOPHIL # BLD AUTO: 0.11 THOUSAND/ÂΜL (ref 0–0.61)
EOSINOPHIL NFR BLD AUTO: 2 % (ref 0–6)
ERYTHROCYTE [DISTWIDTH] IN BLOOD BY AUTOMATED COUNT: 11.9 % (ref 11.6–15.1)
GFR SERPL CREATININE-BSD FRML MDRD: 118 ML/MIN/1.73SQ M
GLUCOSE P FAST SERPL-MCNC: 82 MG/DL (ref 65–99)
GLUCOSE UR STRIP-MCNC: NEGATIVE MG/DL
HCT VFR BLD AUTO: 37 % (ref 34.8–46.1)
HGB BLD-MCNC: 13 G/DL (ref 11.5–15.4)
HGB UR QL STRIP.AUTO: NEGATIVE
IMM GRANULOCYTES # BLD AUTO: 0.02 THOUSAND/UL (ref 0–0.2)
IMM GRANULOCYTES NFR BLD AUTO: 0 % (ref 0–2)
KETONES UR STRIP-MCNC: NEGATIVE MG/DL
LEUKOCYTE ESTERASE UR QL STRIP: NEGATIVE
LYMPHOCYTES # BLD AUTO: 1.1 THOUSANDS/ÂΜL (ref 0.6–4.47)
LYMPHOCYTES NFR BLD AUTO: 16 % (ref 14–44)
MCH RBC QN AUTO: 32.5 PG (ref 26.8–34.3)
MCHC RBC AUTO-ENTMCNC: 35.1 G/DL (ref 31.4–37.4)
MCV RBC AUTO: 93 FL (ref 82–98)
MONOCYTES # BLD AUTO: 0.41 THOUSAND/ÂΜL (ref 0.17–1.22)
MONOCYTES NFR BLD AUTO: 6 % (ref 4–12)
NEUTROPHILS # BLD AUTO: 5.23 THOUSANDS/ÂΜL (ref 1.85–7.62)
NEUTS SEG NFR BLD AUTO: 76 % (ref 43–75)
NITRITE UR QL STRIP: NEGATIVE
NRBC BLD AUTO-RTO: 0 /100 WBCS
PH UR STRIP.AUTO: 6.5 [PH]
PLATELET # BLD AUTO: 253 THOUSANDS/UL (ref 149–390)
PMV BLD AUTO: 9.7 FL (ref 8.9–12.7)
POTASSIUM SERPL-SCNC: 4.1 MMOL/L (ref 3.5–5.3)
PROT SERPL-MCNC: 6.3 G/DL (ref 6.4–8.4)
PROT UR STRIP-MCNC: NEGATIVE MG/DL
PROT UR-MCNC: 7.1 MG/DL
PROT/CREAT UR: 0.1 MG/G{CREAT}
RBC # BLD AUTO: 4 MILLION/UL (ref 3.81–5.12)
RH BLD: POSITIVE
RUBV IGG SERPL IA-ACNC: 101.9 IU/ML
SODIUM SERPL-SCNC: 137 MMOL/L (ref 135–147)
SP GR UR STRIP.AUTO: 1.01 (ref 1–1.03)
SPECIMEN EXPIRATION DATE: NORMAL
URATE SERPL-MCNC: 3.6 MG/DL (ref 2–7.5)
UROBILINOGEN UR STRIP-ACNC: <2 MG/DL
WBC # BLD AUTO: 6.9 THOUSAND/UL (ref 4.31–10.16)

## 2025-04-22 PROCEDURE — 82570 ASSAY OF URINE CREATININE: CPT

## 2025-04-22 PROCEDURE — 86901 BLOOD TYPING SEROLOGIC RH(D): CPT

## 2025-04-22 PROCEDURE — 86762 RUBELLA ANTIBODY: CPT

## 2025-04-22 PROCEDURE — 86900 BLOOD TYPING SEROLOGIC ABO: CPT

## 2025-04-22 PROCEDURE — 86850 RBC ANTIBODY SCREEN: CPT

## 2025-04-22 PROCEDURE — 85025 COMPLETE CBC W/AUTO DIFF WBC: CPT

## 2025-04-22 PROCEDURE — 84550 ASSAY OF BLOOD/URIC ACID: CPT

## 2025-04-22 PROCEDURE — 87389 HIV-1 AG W/HIV-1&-2 AB AG IA: CPT

## 2025-04-22 PROCEDURE — 87086 URINE CULTURE/COLONY COUNT: CPT

## 2025-04-22 PROCEDURE — 81003 URINALYSIS AUTO W/O SCOPE: CPT

## 2025-04-22 PROCEDURE — 86803 HEPATITIS C AB TEST: CPT

## 2025-04-22 PROCEDURE — 87340 HEPATITIS B SURFACE AG IA: CPT

## 2025-04-22 PROCEDURE — 84156 ASSAY OF PROTEIN URINE: CPT

## 2025-04-22 PROCEDURE — 86780 TREPONEMA PALLIDUM: CPT

## 2025-04-22 PROCEDURE — 86706 HEP B SURFACE ANTIBODY: CPT

## 2025-04-22 PROCEDURE — 80053 COMPREHEN METABOLIC PANEL: CPT

## 2025-04-22 PROCEDURE — 36415 COLL VENOUS BLD VENIPUNCTURE: CPT

## 2025-04-23 LAB
BACTERIA UR CULT: NORMAL
HBV SURFACE AB SER-ACNC: 145 MIU/ML
HBV SURFACE AG SER QL: NORMAL
HCV AB SER QL: NORMAL
HIV 1+2 AB+HIV1 P24 AG SERPL QL IA: NORMAL
TREPONEMA PALLIDUM IGG+IGM AB [PRESENCE] IN SERUM OR PLASMA BY IMMUNOASSAY: NORMAL

## 2025-04-24 ENCOUNTER — RESULTS FOLLOW-UP (OUTPATIENT)
Dept: OBGYN CLINIC | Facility: CLINIC | Age: 30
End: 2025-04-24

## 2025-04-28 NOTE — PROGRESS NOTES
OB/GYN  PN Visit  Martine Leigh  [id number]  2025  9:27 AM  Bill Yi PA-C    S: 30 y.o.  13w1d here for PN visit. Pregnancy complicated by prior PTD and prior preg with preE without severe features.     OB complaints:  Denies n/v/HA/cramping/vb/LOF/edema/DV/smoking.      O:    Pre- Vitals      Flowsheet Row Most Recent Value   Prenatal Assessment    Fetal Heart Rate 160   Prenatal Vitals    Blood Pressure 124/82   Weight - Scale 78 kg (172 lb)   Urine Albumin/Glucose    Dilation/Effacement/Station    Vaginal Drainage    Draining Fluid No   Edema    LLE Edema None   RLE Edema None              Gen: no acute distress, nonlabored breathing.  OB exam completed: fundal height, +FHT.  Urine: +/-; encouraged hydration  NOB part 2 physical exam completed today    A/P:    Assessment & Plan  Prenatal care, subsequent pregnancy in first trimester  - Continue PNV  - Labor precautions reviewed;  - Fetal kick counts reviewed - NA  - Labs: UTD  -Pap: Pap and C/G collected 25  -Rh: pos  - Genetics: declines  - Ultrasounds: L2 scheduled 25  - Tdap:Will offer after 27w  - Flu Shot: Will offer in season  - RSV:  Will offer during season (-) @ 35y2n-94u3o   - Rhogam: n/a  - Delivery: tbd  - Contraception: tbd  - Breastfeeding: tbd  - Pediatrician: established  -Delivery Consent & Packet: at 30w  -GBS: at 36 weeks  -RTO 4 weeks  Orders:    Liquid-based pap, screening    Chlamydia/GC amplified DNA by PCR      Bill Yi PA-C  2025  9:27 AM

## 2025-04-30 ENCOUNTER — INITIAL PRENATAL (OUTPATIENT)
Age: 30
End: 2025-04-30

## 2025-04-30 VITALS — WEIGHT: 172 LBS | BODY MASS INDEX: 30.47 KG/M2 | DIASTOLIC BLOOD PRESSURE: 82 MMHG | SYSTOLIC BLOOD PRESSURE: 124 MMHG

## 2025-04-30 DIAGNOSIS — Z34.81 PRENATAL CARE, SUBSEQUENT PREGNANCY IN FIRST TRIMESTER: Primary | ICD-10-CM

## 2025-04-30 PROBLEM — O14.90 PREECLAMPSIA: Status: RESOLVED | Noted: 2022-11-23 | Resolved: 2025-04-30

## 2025-04-30 PROBLEM — O09.892 HX OF PRETERM DELIVERY, CURRENTLY PREGNANT, SECOND TRIMESTER: Status: RESOLVED | Noted: 2022-06-03 | Resolved: 2025-04-30

## 2025-04-30 PROBLEM — Z34.90 ENCOUNTER FOR INDUCTION OF LABOR: Status: RESOLVED | Noted: 2022-11-23 | Resolved: 2025-04-30

## 2025-04-30 PROBLEM — Z34.93 PRENATAL CARE IN THIRD TRIMESTER: Status: RESOLVED | Noted: 2022-10-21 | Resolved: 2025-04-30

## 2025-04-30 PROBLEM — Z3A.37 37 WEEKS GESTATION OF PREGNANCY: Status: RESOLVED | Noted: 2022-06-03 | Resolved: 2025-04-30

## 2025-04-30 PROCEDURE — 87491 CHLMYD TRACH DNA AMP PROBE: CPT | Performed by: PHYSICIAN ASSISTANT

## 2025-04-30 PROCEDURE — G0145 SCR C/V CYTO,THINLAYER,RESCR: HCPCS | Performed by: PHYSICIAN ASSISTANT

## 2025-04-30 PROCEDURE — G0476 HPV COMBO ASSAY CA SCREEN: HCPCS | Performed by: PHYSICIAN ASSISTANT

## 2025-04-30 PROCEDURE — 87591 N.GONORRHOEAE DNA AMP PROB: CPT | Performed by: PHYSICIAN ASSISTANT

## 2025-04-30 PROCEDURE — PNV: Performed by: PHYSICIAN ASSISTANT

## 2025-04-30 NOTE — ASSESSMENT & PLAN NOTE
- Continue PNV  - Labor precautions reviewed;  - Fetal kick counts reviewed - NA  - Labs: UTD  -Pap: Pap and C/G collected 4/30/25  -Rh: pos  - Genetics: declines  - Ultrasounds: L2 scheduled 6/19/25  - Tdap:Will offer after 27w  - Flu Shot: Will offer in season  - RSV:  Will offer during season (9/1-1/31) @ 88y1v-15m7r   - Rhogam: n/a  - Delivery: tbd  - Contraception: tbd  - Breastfeeding: tbd  - Pediatrician: established  -Delivery Consent & Packet: at 30w  -GBS: at 36 weeks  -RTO 4 weeks  Orders:    Liquid-based pap, screening    Chlamydia/GC amplified DNA by PCR

## 2025-05-01 LAB
HPV HR 12 DNA CVX QL NAA+PROBE: NEGATIVE
HPV16 DNA CVX QL NAA+PROBE: NEGATIVE
HPV18 DNA CVX QL NAA+PROBE: NEGATIVE

## 2025-05-02 LAB
C TRACH DNA SPEC QL NAA+PROBE: NEGATIVE
N GONORRHOEA DNA SPEC QL NAA+PROBE: NEGATIVE

## 2025-05-05 ENCOUNTER — RESULTS FOLLOW-UP (OUTPATIENT)
Age: 30
End: 2025-05-05

## 2025-05-06 LAB
LAB AP GYN PRIMARY INTERPRETATION: NORMAL
LAB AP LMP: NORMAL
Lab: NORMAL

## 2025-05-20 ENCOUNTER — TELEPHONE (OUTPATIENT)
Dept: OBGYN CLINIC | Facility: CLINIC | Age: 30
End: 2025-05-20

## 2025-05-23 ENCOUNTER — TELEPHONE (OUTPATIENT)
Dept: OBGYN CLINIC | Facility: CLINIC | Age: 30
End: 2025-05-23

## 2025-05-23 NOTE — TELEPHONE ENCOUNTER
Left message for pt to call back to reschedule appts on 8/26, 10/7 and 10/20 due to provider not in office.

## 2025-05-27 ENCOUNTER — ROUTINE PRENATAL (OUTPATIENT)
Dept: OBGYN CLINIC | Facility: CLINIC | Age: 30
End: 2025-05-27

## 2025-05-27 VITALS
HEART RATE: 88 BPM | WEIGHT: 175 LBS | DIASTOLIC BLOOD PRESSURE: 70 MMHG | SYSTOLIC BLOOD PRESSURE: 110 MMHG | BODY MASS INDEX: 31 KG/M2

## 2025-05-27 DIAGNOSIS — Z34.82 PRENATAL CARE, SUBSEQUENT PREGNANCY IN SECOND TRIMESTER: Primary | ICD-10-CM

## 2025-05-27 PROCEDURE — PNV

## 2025-05-27 NOTE — PROGRESS NOTES
OB/GYN  PN Visit  Martine Leigh  [id number]  2025  12:08 PM  YULIYA Singh    S: 30 y.o.  17w0d here for PN visit.   She denies contractions. She denies leakage of fluid and vaginal bleeding.   She endorses good fetal movement.   Her pregnancy is complicated by history of  delivery and history of pre-e/       O:  Pre-Aidee Vitals      Flowsheet Row Most Recent Value   Prenatal Assessment    Fetal Heart Rate 147   Movement Present   Prenatal Vitals    Blood Pressure 110/70   Weight - Scale 79.4 kg (175 lb)   Urine Albumin/Glucose    Dilation/Effacement/Station    Vaginal Drainage    Edema           Wt=79.4 kg (175 lb); Body mass index is 31 kg/m².; TWG=0.454 kg (1 lb)  Physical Exam    General: Well appearing, no distress  Respiratory: Unlabored breathing  Abdomen: Soft, gravid, nontender  Fundal Height: Appropriate for gestational age.   Extremities: Warm and well perfused.  Non tender.  OB exam completed: fundal height, +FHT.  Urine: -/-     A/P:    Problem List Items Addressed This Visit       Prenatal care, subsequent pregnancy in second trimester - Primary    - Continue PNV  - Labor precautions reviewed;  - Fetal kick counts reviewed - NA  - Labs: UTD  -Pap: Pap and C/G collected 25  -Rh: pos  - Genetics: declines  - Ultrasounds: L2 scheduled 25. Declined L1 ultrasound   - Tdap:Will offer after 27w  - Flu Shot: Will offer in season  - RSV:  Will offer during season (-) @ 54w1h-14m2x   - Rhogam: n/a  - Delivery: tbd  - Contraception: tbd  - Breastfeeding: tbd  - Pediatrician: established  -Delivery Consent & Packet: at 30w  -GBS: at 36 weeks  -RTO 4 weeks                YULIYA Singh  2025  12:08 PM

## 2025-05-27 NOTE — ASSESSMENT & PLAN NOTE
- Continue PNV  - Labor precautions reviewed;  - Fetal kick counts reviewed - NA  - Labs: UTD  -Pap: Pap and C/G collected 4/30/25  -Rh: pos  - Genetics: declines  - Ultrasounds: L2 scheduled 6/19/25. Declined L1 ultrasound   - Tdap:Will offer after 27w  - Flu Shot: Will offer in season  - RSV:  Will offer during season (9/1-1/31) @ 26e6u-69c7s   - Rhogam: n/a  - Delivery: tbd  - Contraception: tbd  - Breastfeeding: tbd  - Pediatrician: established  -Delivery Consent & Packet: at 30w  -GBS: at 36 weeks  -RTO 4 weeks

## 2025-06-13 ENCOUNTER — NURSE TRIAGE (OUTPATIENT)
Age: 30
End: 2025-06-13

## 2025-06-13 ENCOUNTER — HOSPITAL ENCOUNTER (OUTPATIENT)
Facility: HOSPITAL | Age: 30
Discharge: HOME | End: 2025-06-13
Attending: OBSTETRICS & GYNECOLOGY | Admitting: OBSTETRICS & GYNECOLOGY
Payer: COMMERCIAL

## 2025-06-13 VITALS
RESPIRATION RATE: 18 BRPM | SYSTOLIC BLOOD PRESSURE: 102 MMHG | WEIGHT: 175 LBS | TEMPERATURE: 98.4 F | BODY MASS INDEX: 31.01 KG/M2 | HEIGHT: 63 IN | HEART RATE: 86 BPM | DIASTOLIC BLOOD PRESSURE: 58 MMHG

## 2025-06-13 DIAGNOSIS — R51.9 HEADACHE: Primary | ICD-10-CM

## 2025-06-13 PROCEDURE — 96374 THER/PROPH/DIAG INJ IV PUSH: CPT

## 2025-06-13 PROCEDURE — NC001 PR NO CHARGE: Performed by: OBSTETRICS & GYNECOLOGY

## 2025-06-13 PROCEDURE — 96360 HYDRATION IV INFUSION INIT: CPT

## 2025-06-13 PROCEDURE — 96361 HYDRATE IV INFUSION ADD-ON: CPT

## 2025-06-13 PROCEDURE — 99213 OFFICE O/P EST LOW 20 MIN: CPT

## 2025-06-13 PROCEDURE — 96375 TX/PRO/DX INJ NEW DRUG ADDON: CPT

## 2025-06-13 RX ORDER — METOCLOPRAMIDE HYDROCHLORIDE 5 MG/ML
10 INJECTION INTRAMUSCULAR; INTRAVENOUS EVERY 6 HOURS PRN
Status: DISCONTINUED | OUTPATIENT
Start: 2025-06-13 | End: 2025-06-13 | Stop reason: HOSPADM

## 2025-06-13 RX ORDER — DIPHENHYDRAMINE HCL 25 MG
25 TABLET ORAL EVERY 6 HOURS PRN
Qty: 30 TABLET | Refills: 0 | Status: SHIPPED | OUTPATIENT
Start: 2025-06-13

## 2025-06-13 RX ORDER — ACETAMINOPHEN 325 MG/1
650 TABLET ORAL EVERY 6 HOURS PRN
COMMUNITY

## 2025-06-13 RX ORDER — DIPHENHYDRAMINE HYDROCHLORIDE 50 MG/ML
25 INJECTION, SOLUTION INTRAMUSCULAR; INTRAVENOUS EVERY 6 HOURS PRN
Status: DISCONTINUED | OUTPATIENT
Start: 2025-06-13 | End: 2025-06-13 | Stop reason: HOSPADM

## 2025-06-13 RX ORDER — METOCLOPRAMIDE 10 MG/1
10 TABLET ORAL 4 TIMES DAILY
Qty: 20 TABLET | Refills: 0 | Status: SHIPPED | OUTPATIENT
Start: 2025-06-13

## 2025-06-13 RX ORDER — ACETAMINOPHEN 10 MG/ML
1000 INJECTION, SOLUTION INTRAVENOUS ONCE
Status: COMPLETED | OUTPATIENT
Start: 2025-06-13 | End: 2025-06-13

## 2025-06-13 RX ADMIN — ACETAMINOPHEN 1000 MG: 10 INJECTION, SOLUTION INTRAVENOUS at 13:11

## 2025-06-13 RX ADMIN — METOCLOPRAMIDE 10 MG: 5 INJECTION, SOLUTION INTRAMUSCULAR; INTRAVENOUS at 12:39

## 2025-06-13 RX ADMIN — SODIUM CHLORIDE, SODIUM LACTATE, POTASSIUM CHLORIDE, AND CALCIUM CHLORIDE 1000 ML: .6; .31; .03; .02 INJECTION, SOLUTION INTRAVENOUS at 12:38

## 2025-06-13 RX ADMIN — DIPHENHYDRAMINE HYDROCHLORIDE 25 MG: 50 INJECTION INTRAMUSCULAR; INTRAVENOUS at 12:39

## 2025-06-13 NOTE — PROGRESS NOTES
"L&D Triage Note - OB/GYN  Martine Leigh 30 y.o. female MRN: [id number]  Unit/Bed#: LD TRIAGE 2- Encounter: [id number]      ASSESSMENT/PLAN  Martine Leigh is a 30 y.o.  at 19w3d who presents for evaluation of a headache. Improved with medications and normal Bps. Stable for Discharge.       1) Headache  - Normal Blood pressures  - Headache resolved after administration of tylenol/reglan/benadryl   -Reglan benadryl sent to patient pharmacy  - Continue routine prenatal care      2)  Discharge instructions  - Patient instructed to call if experiencing worsening contractions, vaginal bleeding, loss of fluid or decreased fetal movement.  - Will follow up with OBGYN in office      She is a patient of [facility]   D/w Dr. Viera, on call OBGYN Attending Physician  ______________    SUBJECTIVE    EMMANUEL: Estimated Date of Delivery: 25    HPI:  30 y.o.  19w3d presents with complaint of a severe headache that has been on and off for several weeks. She reports she has no history of headaches, and that this headache is dull in nature. It improves with tylenol but does not resolve completely. This morning she feels nauseous and is not able to keep food down but can tolerate liquids. She has no obstetrics complaints.     Contractions: no  Leakage of fluid: no  Vaginal Bleeding: no  Fetal movement: appropriate for GA    Her obstetrical history is significant for history of Pre-eclampsia in a prior pregnancy    ROS:  Constitutional: Negative  Respiratory: Negative  Cardiovascular: Negative    Gastrointestinal: Negative    Physical Exam  General: Well appearing, no distress  Respiratory: Unlabored breathing  Cardiovascular: Regular rate  Abdomen: Soft, gravid, nontender   Fundal Height: Appropriate for gestational age.  Extremities: Warm and well perfused.  Non tender.      OBJECTIVE:  /58   Pulse 86   Temp 98.4 °F (36.9 °C) (Oral)   Resp 18   Ht 5' 3\" (1.6 m)   Wt 79.4 kg (175 lb)   LMP 2025 " "(Exact Date)   BMI 31.00 kg/m²   Body mass index is 31 kg/m².  Labs: No results found for this or any previous visit (from the past 24 hours).      SVE:  Deferred by patient    FHT:  153 via doppler         Kenna Jaramillo DO  OB/GYN   6/13/2025  1:59 PM      Portions of the record may have been created with voice recognition software.  Occasional wrong word or \"sound a like\" substitutions may have occurred due to the inherent limitations of voice recognition software.  Read the chart carefully and recognize, using context, where substitutions have occurred   "

## 2025-06-13 NOTE — TELEPHONE ENCOUNTER
"REASON FOR CONVERSATION: Headache    SYMPTOMS: Patient is 19w3d  calling to report headaches. Started with sporadic HAs 2 weeks ago which went on for 1 week. 1 week ago, HAs became more consistent and frequent. Notes 6/10 HA in forehead, left eye, and left check with photosensitivity. Pain varies from dull to sharp. Tylenol helps but never resolves her HA. Report history Pre E in previous pregnancy. Feeling movement. Denies history migraines, fever, vaginal bleeding, LOF, contractions, stiff neck, abdominal pain, chest pain, SOB, and swelling.     OTHER HEALTH INFORMATION: 19w3d . History Pre E    PROTOCOL DISPOSITION: See Today in Office    CARE ADVICE PROVIDED: Go to L&D, call back for any questions prior to arrival    PRACTICE FOLLOW-UP: ESC to on call Dr. Viera and unit charge RN regarding incoming patient.          Reason for Disposition   MODERATE headache (e.g., interferes with normal activities), present more than 24 hours, and unexplained  (Exceptions: Has not tried pain medicines, typical migraine, or headache part of viral illness.)    Answer Assessment - Initial Assessment Questions  1. LOCATION: \"Where does it hurt?\"       Forehead, left eye, and cheek - light sensitivity when pain is worse  2. ONSET: \"When did the headache start?\" (e.g., minutes, hours or days)       1 week consistent, 2 weeks ago sporadic  3. PATTERN: \"Does the pain come and go, or has it been constant since it started?\"     Initially intermittent x 1 week, now Persistent x 1 week  4. SEVERITY: \"How bad is the pain?\" and \"What does it keep you from doing?\"       6/10 varies from sharp to dull  5. RECURRENT SYMPTOM: \"Have you ever had headaches before?\" If Yes, ask: \"When was the last time?\" and \"What happened that time?\"       Not prior to pregnancy  6. CAUSE: \"What do you think is causing the headache?\"      unsure  7. MIGRAINE: \"Have you been diagnosed with migraine headaches?\" If Yes, ask: \"Is this headache similar?\"     " "  No hx  8. HEAD INJURY: \"Has there been any recent injury to the head?\"       Denies  9. OTHER SYMPTOMS: \"Do you have any other symptoms?\" (e.g., abdomen pain, blurred vision, fever, stiff neck; swelling of hands, face, or feet)      Denies fever, vaginal bleeding, LOF, contractions, stiff neck, abdominal pain, chest pain, SOB, and swelling.   10. PREGNANCY: \"How many weeks pregnant are you?\"        19w 3d - feeling movement  11. EMMANUEL: \"What date are you expecting to deliver?\"        11/4/25  Has been checking BP at home - yesterday 100/70 on home cuff  Notes history Pre E    Protocols used: Pregnancy - Headache-Adult-OH    "

## 2025-06-18 PROBLEM — Z36.3 ENCOUNTER FOR ANTENATAL SCREENING FOR MALFORMATION: Status: ACTIVE | Noted: 2025-06-18

## 2025-06-18 PROBLEM — O99.212 OBESITY AFFECTING PREGNANCY IN SECOND TRIMESTER: Status: ACTIVE | Noted: 2025-06-18

## 2025-06-18 PROBLEM — E66.811 OBESITY, CLASS I, BMI 30-34.9: Status: ACTIVE | Noted: 2025-06-18

## 2025-06-19 ENCOUNTER — ROUTINE PRENATAL (OUTPATIENT)
Facility: HOSPITAL | Age: 30
End: 2025-06-19
Payer: COMMERCIAL

## 2025-06-19 ENCOUNTER — ANCILLARY PROCEDURE (OUTPATIENT)
Facility: HOSPITAL | Age: 30
End: 2025-06-19
Attending: OBSTETRICS & GYNECOLOGY
Payer: COMMERCIAL

## 2025-06-19 VITALS
HEART RATE: 111 BPM | DIASTOLIC BLOOD PRESSURE: 76 MMHG | BODY MASS INDEX: 31.48 KG/M2 | WEIGHT: 177.69 LBS | SYSTOLIC BLOOD PRESSURE: 124 MMHG | HEIGHT: 63 IN

## 2025-06-19 DIAGNOSIS — Z3A.09 9 WEEKS GESTATION OF PREGNANCY: ICD-10-CM

## 2025-06-19 DIAGNOSIS — Z3A.20 20 WEEKS GESTATION OF PREGNANCY: ICD-10-CM

## 2025-06-19 DIAGNOSIS — O09.292 HX OF PREECLAMPSIA, PRIOR PREGNANCY, CURRENTLY PREGNANT, SECOND TRIMESTER: Primary | ICD-10-CM

## 2025-06-19 DIAGNOSIS — O09.899 HISTORY OF PRETERM DELIVERY, CURRENTLY PREGNANT: ICD-10-CM

## 2025-06-19 DIAGNOSIS — Z36.3 ENCOUNTER FOR ANTENATAL SCREENING FOR MALFORMATION: ICD-10-CM

## 2025-06-19 DIAGNOSIS — O99.212 OBESITY AFFECTING PREGNANCY IN SECOND TRIMESTER, UNSPECIFIED OBESITY TYPE: ICD-10-CM

## 2025-06-19 DIAGNOSIS — Z36.86 ENCOUNTER FOR ANTENATAL SCREENING FOR CERVICAL LENGTH: ICD-10-CM

## 2025-06-19 DIAGNOSIS — E66.811 OBESITY, CLASS I, BMI 30-34.9: ICD-10-CM

## 2025-06-19 PROCEDURE — 76817 TRANSVAGINAL US OBSTETRIC: CPT | Performed by: OBSTETRICS & GYNECOLOGY

## 2025-06-19 PROCEDURE — 76811 OB US DETAILED SNGL FETUS: CPT | Performed by: OBSTETRICS & GYNECOLOGY

## 2025-06-19 PROCEDURE — 99214 OFFICE O/P EST MOD 30 MIN: CPT | Performed by: OBSTETRICS & GYNECOLOGY

## 2025-06-19 PROCEDURE — NC001 PR NO CHARGE: Performed by: OBSTETRICS & GYNECOLOGY

## 2025-06-19 RX ORDER — ASPIRIN 81 MG/1
162 TABLET, CHEWABLE ORAL DAILY
Qty: 180 TABLET | Refills: 2 | Status: SHIPPED | OUTPATIENT
Start: 2025-06-19

## 2025-06-19 NOTE — LETTER
2025     YULIYA Segundo  [address] [place] [id number]    Patient: Martine Leigh   YOB: 1995   Date of Visit: 2025       Dear  YULIYA Segundo:    Thank you for referring Martine Leigh to me for evaluation. Below are my notes for this consultation.    If you have questions, please do not hesitate to call me. I look forward to following your patient along with you.         Sincerely,        Mechelle Schultz MD        CC: No Recipients    Mechelle Schultz MD  2025  1:57 PM  Sign when Signing Visit  CONSULTATION: MATERNAL-FETAL MEDICINE    Dear Yuliya Segundo  [address] [address],    Thank you very much for your kind referral of patient Martine Leigh for Maternal-Fetal Medicine consultation. As you know, Martine is a 30 y.o.  at 20w2d presenting for consultation for anatomic survey, prior  birth, prior preeclampsia.  She has no complaints today.    Her history is significant for:  Problem List[1]    Obstetric History:  OB History    Para Term  AB Living   3 2 1 1 0 2   SAB IAB Ectopic Multiple Live Births   0 0 0 0 2      # Outcome Date GA Lbr Osvaldo/2nd Weight Sex Type Anes PTL Lv   3 Current            2 Term 22 37w4d / 00:04 2700 g (5 lb 15.2 oz) F Vag-Spont EPI N AAKASH      Birth Comments: IOL preE w/o SF      Complications: Pre-eclampsia   1  21 34w5d / 00:26 2155 g (4 lb 12 oz) F Vag-Spont EPI Y AAKASH      Birth Comments: NICU for 2 weeks      Complications: History of  premature rupture of membranes (PPROM)       Past Medical History:  Class 1 obesity  Prior preeclampsia  Prior  birth    Past Surgical History:  Past Surgical History[2]    Social History:   She denies current use of alcohol, drugs of abuse, tobacco, and marijuana products.   Occupation: Stay at home mom  Partner: Juan Pablo is 37 and works as a     Family History:  Family history was reviewed using an office  "screening tool, and is negative for congenital anomalies, genetic diseases, and thromboembolism in first degree relatives of this pregnancy. Family history is notable for hypertension.    Medications:  Current Medications[3]    Allergies:  Allergies   Allergen Reactions   • Ceclor [Cefaclor] Rash     Childhood reaction          Exam:  Vitals: Blood pressure 124/76, pulse (!) 111, height 5' 3\" (1.6 m), weight 80.6 kg (177 lb 11.1 oz), last menstrual period 2025, not currently breastfeeding.  Physical Exam  On examination, she is awake, alert and oriented. Mood and affect are appropriate.  The remainder of her physical examination was deferred as she was here today for consultation and discussion.    My recommendations are as follows:     History of  delivery, currently pregnant  In her most recent pregnancy Martine underwent serial cervical length surveillance and was on vaginal progesterone.  She states neither of these have been offered yet during this pregnancy.    We discussed that a subsequent term birth while reassuring does not illuminate the increased risk of  birth given a prior spontaneous  birth.    The strongest predictor of  birth (PTB) is a prior spontaneous  birth (sPTB). Spontaneous  birth (sPTB) recurs in 35 to 50% of pregnancies, and tends to recur at similar gestational ages. A prior meta-analysis (EPPPIC, Lancet ) showed that compared with those who received no treatment, women with carr pregnancies at high risk for  birth (PTB) due to prior spontaneous  birth who received vaginal progesterone were less likely to deliver before 35 weeks of gestation. Subsequently an even more recent meta analysis in  by Kiera, AJOG , did not find a sustained benefit of vaginal progesterone to prevent recurrent  birth. Therefore, ACOG does not currently recommend vaginal progesterone without cervical shortening, " "while [facility] supports shared decision making given mixed available data and low risk of harm for women with prior history of  birth.    - Serial transvaginal ultrasound studies are recommended between 16 and 23 completed weeks gestation. Cervical cerclage is recommended with cervical shortening to less than 25 mm prior to 24 weeks gestation and after further evaluation and counseling.    - After our discussion, Martine has opted to defer vaginal progesterone and opts for starting cervical length screening now. A repeat cervical length is advised at 22 weeks    Hx of preeclampsia, prior pregnancy, currently pregnant, second trimester  Prevention of preeclampsia: Due to the risk factors of prior preeclampsia, starting BMI >30 and prior  birth she was recommended to start daily aspirin 162 mg q day for preeclampsia prevention as per ACOG Committee Opinion #743. This prophylactic medication is more effective when started prior to 16 weeks but can be started as late as 28 weeks. Use of aspirin 162 mg daily (81 milligram baby aspirin two tablets) up to 36 weeks gestation is recommended, which is a dose different than the one recommended by ACOG. New data suggests that a dose higher than 100 mg and started before 16 weeks gestation can reduce the risk of  preeclampsia (Benjamin CHRISTENSEN et al, [facility], 2017).  - This was prescribed for her and can be started today and discontinued at 36 weeks    Encounter for  screening for malformation  We reviewed the reassuring anatomic survey and cervical length today. See today's [facility] US report under \"imaging\" tab. Although encouraging, even a normal-appearing ultrasound cannot exclude all malformations, or the possibility of a genetic syndrome.     We reviewed the option of aneuploidy screening which she declines           Please see today's [facility] ultrasound report documented separately under \"imaging\" tab in Epic.         Sincerely,    Mechelle Schultz MD  Attending " Physician, Maternal-Fetal Medicine  [facility]            [1]   Patient Active Problem List  Diagnosis   • Prenatal care, subsequent pregnancy in second trimester   • Headache   • Obesity affecting pregnancy in second trimester   • Encounter for  screening for malformation   • Obesity, Class I, BMI 30-34.9   • Hx of preeclampsia, prior pregnancy, currently pregnant, second trimester   • History of  delivery, currently pregnant   [2]   Past Surgical History:  Procedure Laterality Date   • WISDOM TOOTH EXTRACTION Bilateral    [3]   Current Outpatient Medications:   •  acetaminophen (TYLENOL) 325 mg tablet, Take 650 mg by mouth every 6 (six) hours as needed for mild pain, Disp: , Rfl:   •  aspirin 81 mg chewable tablet, Chew 2 tablets (162 mg total) daily Continue to take daily until 36 weeks of pregnancy., Disp: 180 tablet, Rfl: 2  •  Prenatal Vit-DSS-Fe Cbn-FA (PRENATAL AD PO), Take by mouth, Disp: , Rfl:   •  diphenhydrAMINE (BENADRYL) 25 mg tablet, Take 1 tablet (25 mg total) by mouth every 6 (six) hours as needed for itching, Disp: 30 tablet, Rfl: 0  •  metoclopramide (Reglan) 10 mg tablet, Take 1 tablet (10 mg total) by mouth 4 (four) times a day, Disp: 20 tablet, Rfl: 0

## 2025-06-19 NOTE — ASSESSMENT & PLAN NOTE
In her most recent pregnancy Martine underwent serial cervical length surveillance and was on vaginal progesterone.  She states neither of these have been offered yet during this pregnancy.    We discussed that a subsequent term birth while reassuring does not illuminate the increased risk of  birth given a prior spontaneous  birth.    The strongest predictor of  birth (PTB) is a prior spontaneous  birth (sPTB). Spontaneous  birth (sPTB) recurs in 35 to 50% of pregnancies, and tends to recur at similar gestational ages. A prior meta-analysis (EPPPIC, Lancet ) showed that compared with those who received no treatment, women with carr pregnancies at high risk for  birth (PTB) due to prior spontaneous  birth who received vaginal progesterone were less likely to deliver before 35 weeks of gestation. Subsequently an even more recent meta analysis in  by Kiera, AJOG , did not find a sustained benefit of vaginal progesterone to prevent recurrent  birth. Therefore, ACOG does not currently recommend vaginal progesterone without cervical shortening, while [facility] supports shared decision making given mixed available data and low risk of harm for women with prior history of  birth.    - Serial transvaginal ultrasound studies are recommended between 16 and 23 completed weeks gestation. Cervical cerclage is recommended with cervical shortening to less than 25 mm prior to 24 weeks gestation and after further evaluation and counseling.    - After our discussion, Martine has opted to defer vaginal progesterone and opts for starting cervical length screening now. A repeat cervical length is advised at 22 weeks

## 2025-06-19 NOTE — PROGRESS NOTES
"CONSULTATION: MATERNAL-FETAL MEDICINE    Dear Abbey Ni, [place]  [address] [place] [address],    Thank you very much for your kind referral of patient Martine Leigh for Maternal-Fetal Medicine consultation. As you know, Martine is a 30 y.o.  at 20w2d presenting for consultation for anatomic survey, prior  birth, prior preeclampsia.  She has no complaints today.    Her history is significant for:  Problem List[1]    Obstetric History:  OB History    Para Term  AB Living   3 2 1 1 0 2   SAB IAB Ectopic Multiple Live Births   0 0 0 0 2      # Outcome Date GA Lbr Osvaldo/2nd Weight Sex Type Anes PTL Lv   3 Current            2 Term 22 37w4d / 00:04 2700 g (5 lb 15.2 oz) F Vag-Spont EPI N AAKASH      Birth Comments: IOL preE w/o SF      Complications: Pre-eclampsia   1  21 34w5d / 00:26 2155 g (4 lb 12 oz) F Vag-Spont EPI Y AAKASH      Birth Comments: NICU for 2 weeks      Complications: History of  premature rupture of membranes (PPROM)       Past Medical History:  Class 1 obesity  Prior preeclampsia  Prior  birth    Past Surgical History:  Past Surgical History[2]    Social History:   She denies current use of alcohol, drugs of abuse, tobacco, and marijuana products.   Occupation: Stay at home mom  Partner: Juan Pablo is 37 and works as a     Family History:  Family history was reviewed using an office screening tool, and is negative for congenital anomalies, genetic diseases, and thromboembolism in first degree relatives of this pregnancy. Family history is notable for hypertension.    Medications:  Current Medications[3]    Allergies:  Allergies   Allergen Reactions    Ceclor [Cefaclor] Rash     Childhood reaction          Exam:  Vitals: Blood pressure 124/76, pulse (!) 111, height 5' 3\" (1.6 m), weight 80.6 kg (177 lb 11.1 oz), last menstrual period 2025, not currently breastfeeding.  Physical Exam  On examination, she is awake, alert and " oriented. Mood and affect are appropriate.  The remainder of her physical examination was deferred as she was here today for consultation and discussion.    My recommendations are as follows:     History of  delivery, currently pregnant  In her most recent pregnancy Martine underwent serial cervical length surveillance and was on vaginal progesterone.  She states neither of these have been offered yet during this pregnancy.    We discussed that a subsequent term birth while reassuring does not illuminate the increased risk of  birth given a prior spontaneous  birth.    The strongest predictor of  birth (PTB) is a prior spontaneous  birth (sPTB). Spontaneous  birth (sPTB) recurs in 35 to 50% of pregnancies, and tends to recur at similar gestational ages. A prior meta-analysis (EPPPIC, Lancet ) showed that compared with those who received no treatment, women with carr pregnancies at high risk for  birth (PTB) due to prior spontaneous  birth who received vaginal progesterone were less likely to deliver before 35 weeks of gestation. Subsequently an even more recent meta analysis in  by Kiera, AJOG , did not find a sustained benefit of vaginal progesterone to prevent recurrent  birth. Therefore, ACOG does not currently recommend vaginal progesterone without cervical shortening, while [facility] supports shared decision making given mixed available data and low risk of harm for women with prior history of  birth.    - Serial transvaginal ultrasound studies are recommended between 16 and 23 completed weeks gestation. Cervical cerclage is recommended with cervical shortening to less than 25 mm prior to 24 weeks gestation and after further evaluation and counseling.    - After our discussion, Martine has opted to defer vaginal progesterone and opts for starting cervical length screening now. A repeat cervical length is advised at  "22 weeks    Hx of preeclampsia, prior pregnancy, currently pregnant, second trimester  Prevention of preeclampsia: Due to the risk factors of prior preeclampsia, starting BMI >30 and prior  birth she was recommended to start daily aspirin 162 mg q day for preeclampsia prevention as per ACOG Committee Opinion #743. This prophylactic medication is more effective when started prior to 16 weeks but can be started as late as 28 weeks. Use of aspirin 162 mg daily (81 milligram baby aspirin two tablets) up to 36 weeks gestation is recommended, which is a dose different than the one recommended by ACOG. New data suggests that a dose higher than 100 mg and started before 16 weeks gestation can reduce the risk of  preeclampsia (Benjamin CHRISTENSEN et al, [facility], 2017).  - This was prescribed for her and can be started today and discontinued at 36 weeks    Encounter for  screening for malformation  We reviewed the reassuring anatomic survey and cervical length today. See today's [facility] US report under \"imaging\" tab. Although encouraging, even a normal-appearing ultrasound cannot exclude all malformations, or the possibility of a genetic syndrome.     We reviewed the option of aneuploidy screening which she declines           Please see today's [facility] ultrasound report documented separately under \"imaging\" tab in Epic.         Sincerely,    Mechelle Schultz MD  Attending Physician, Maternal-Fetal Medicine  [facility]            [1]   Patient Active Problem List  Diagnosis    Prenatal care, subsequent pregnancy in second trimester    Headache    Obesity affecting pregnancy in second trimester    Encounter for  screening for malformation    Obesity, Class I, BMI 30-34.9    Hx of preeclampsia, prior pregnancy, currently pregnant, second trimester    History of  delivery, currently pregnant   [2]   Past Surgical History:  Procedure Laterality Date    WISDOM TOOTH EXTRACTION Bilateral  "   [3]   Current Outpatient Medications:     acetaminophen (TYLENOL) 325 mg tablet, Take 650 mg by mouth every 6 (six) hours as needed for mild pain, Disp: , Rfl:     aspirin 81 mg chewable tablet, Chew 2 tablets (162 mg total) daily Continue to take daily until 36 weeks of pregnancy., Disp: 180 tablet, Rfl: 2    Prenatal Vit-DSS-Fe Cbn-FA (PRENATAL AD PO), Take by mouth, Disp: , Rfl:     diphenhydrAMINE (BENADRYL) 25 mg tablet, Take 1 tablet (25 mg total) by mouth every 6 (six) hours as needed for itching, Disp: 30 tablet, Rfl: 0    metoclopramide (Reglan) 10 mg tablet, Take 1 tablet (10 mg total) by mouth 4 (four) times a day, Disp: 20 tablet, Rfl: 0

## 2025-06-19 NOTE — ASSESSMENT & PLAN NOTE
Prevention of preeclampsia: Due to the risk factors of prior preeclampsia, starting BMI >30 and prior  birth she was recommended to start daily aspirin 162 mg q day for preeclampsia prevention as per ACOG Committee Opinion #743. This prophylactic medication is more effective when started prior to 16 weeks but can be started as late as 28 weeks. Use of aspirin 162 mg daily (81 milligram baby aspirin two tablets) up to 36 weeks gestation is recommended, which is a dose different than the one recommended by ACOG. New data suggests that a dose higher than 100 mg and started before 16 weeks gestation can reduce the risk of  preeclampsia (Benjamin CHRISTENSEN et al, [facility], 2017).  - This was prescribed for her and can be started today and discontinued at 36 weeks

## 2025-06-19 NOTE — ASSESSMENT & PLAN NOTE
"We reviewed the reassuring anatomic survey and cervical length today. See today's [facility] report under \"imaging\" tab. Although encouraging, even a normal-appearing ultrasound cannot exclude all malformations, or the possibility of a genetic syndrome.     We reviewed the option of aneuploidy screening which she declines        "

## 2025-06-24 ENCOUNTER — ROUTINE PRENATAL (OUTPATIENT)
Dept: OBGYN CLINIC | Facility: CLINIC | Age: 30
End: 2025-06-24

## 2025-06-24 VITALS — SYSTOLIC BLOOD PRESSURE: 110 MMHG | WEIGHT: 179.6 LBS | BODY MASS INDEX: 31.81 KG/M2 | DIASTOLIC BLOOD PRESSURE: 70 MMHG

## 2025-06-24 DIAGNOSIS — Z3A.21 21 WEEKS GESTATION OF PREGNANCY: Primary | ICD-10-CM

## 2025-06-24 PROCEDURE — PNV: Performed by: NURSE PRACTITIONER

## 2025-06-24 NOTE — PROGRESS NOTES
"  OB/GYN  PN Visit  Martine Leigh  [id number]  2025  9:48 AM   YULIYA Chew    S: Martine Leigh 30 y.o.  21w0d here for PN visit. She no contractions. She no leakage of fluid and vaginal bleeding. She has good fetal movement. She denies nausea, vomiting, headache, cramping, edema, domestic violence, and smoking, ETOH or drug. Her pregnancy is complicated by h/o  delivery and Pre-E.    O:      Pre- Vitals      Flowsheet Row Most Recent Value   Prenatal Assessment    Fetal Heart Rate 150   Fundal Height (cm) 22 cm   Prenatal Vitals    Blood Pressure 110/70   Weight - Scale 81.5 kg (179 lb 9.6 oz)   Urine Albumin/Glucose    Dilation/Effacement/Station    Vaginal Drainage    Edema           A/P:  1. 21w0d GESTATION  - Continue PNV  - Labs: UTD  - Genetics: declined  - Ultrasounds: Anatomy scan on 25 - normal. Cervical length @ 22 weeks amd growth at 32 weeks  - Tdap: offer at 28 weeks  - Flu Shot: Will offer in season  - RSV:  Will offer during season (-) @ [id number]   - Rhogam: n/a   - Delivery:   - Contraception: natural family planning  - Infant feeding: breastfeeding; Pump script given today  - Pediatrician: established    - RTO in 4 weeks or sooner if needed    USE THIS FOR OVERVIEW? (Can be edited in the \"Problem list\"  Problem List          Surgery/Wound/Pain    Headache       Obstetrics/Gynecology    Prenatal care, subsequent pregnancy in second trimester    Obesity affecting pregnancy in second trimester    Hx of preeclampsia, prior pregnancy, currently pregnant, second trimester    History of  delivery, currently pregnant    Overview   Was on vag P and got cervical length screening in last pregnancy. Was not yet offered either this time.     First cervical length at 20 weeks with MFM  f/u at 22 weeks for cervical length  defers vaginal progesterone at this time            Other    Encounter for  screening for malformation    Obesity, Class I, BMI " "30-34.9       USE THIS FOR \"DAN\" CHARTING (can be edited in the note)  Assessment & Plan  21 weeks gestation of pregnancy    Orders:    Breast pump        Future Appointments   Date Time Provider Department Center   2025 10:15 AM YULIYA Chew [facility]   2025  3:30 PM YULIYA Chew [facility]   2025  8:30 AM Alisia Viera MD [facility]   2025 11:00 AM YULIYA Singh [facility]   2025 11:15 AM Pat Murillo MD [facility]   2025 10:00 AM  [address]   2025 11:00 AM YULIYA Singh [facility]   10/9/2025  3:15 PM Alisia Viera MD [facility]   10/13/2025 11:30 AM YULIYA Chew [facility]   10/20/2025 11:00 AM Regine Estrada MD [facility]   10/27/2025 11:00 AM Regine Estrada MD [facility]   11/3/2025 11:30 AM YULIYA Chew [facility]   11/10/2025 11:00 AM Regine Estrada MD [facility]         YULIYA Chew  2025  9:48 AM              "

## 2025-07-18 PROBLEM — Z36.3 ENCOUNTER FOR ANTENATAL SCREENING FOR MALFORMATION: Status: RESOLVED | Noted: 2025-06-18 | Resolved: 2025-07-18

## 2025-07-21 ENCOUNTER — ROUTINE PRENATAL (OUTPATIENT)
Dept: OBGYN CLINIC | Facility: CLINIC | Age: 30
End: 2025-07-21

## 2025-07-21 VITALS — DIASTOLIC BLOOD PRESSURE: 60 MMHG | WEIGHT: 188 LBS | BODY MASS INDEX: 33.3 KG/M2 | SYSTOLIC BLOOD PRESSURE: 120 MMHG

## 2025-07-21 DIAGNOSIS — Z34.82 PRENATAL CARE, SUBSEQUENT PREGNANCY IN SECOND TRIMESTER: Primary | ICD-10-CM

## 2025-07-21 PROCEDURE — PNV: Performed by: NURSE PRACTITIONER

## 2025-07-21 NOTE — PROGRESS NOTES
"  OB/GYN  PN Visit  Martine Leigh  [id number]  2025  3:00 PM   YULIYA Chew    S: Martine Leigh 30 y.o.  24w6d here for PN visit. She denies contractions. She denies leakage of fluid and vaginal bleeding. She reports good fetal movement. She has mild edema of hands and feet. She denies nausea, vomiting, headache, cramping, domestic violence, and smoking, ETOH or drug. Her pregnancy is complicated by h/o  delivery and Pre-E.    O:    Pre-Aidee Vitals      Flowsheet Row Most Recent Value   Prenatal Assessment    Fetal Heart Rate 145   Fundal Height (cm) 25 cm   Movement Present   Prenatal Vitals    Blood Pressure 120/60   Weight - Scale 85.3 kg (188 lb)   Urine Albumin/Glucose    Dilation/Effacement/Station    Vaginal Drainage    Edema           A/P:  1. 24w6d GESTATION  - Continue PNV.   - Labs: UTD.   - 28 week labs ordered  - Genetics: declined  - Ultrasounds: L2 normal. Growth scan at 32 weeks scheduled for 25.  - Tdap: offer at 28 weeks  - Flu Shot: Will offer in season  - RSV:  Will offer during season (-) @ [id number]   - Rhogam: N/A  - Delivery:  w/ epidural  - Contraception: TBD  - Infant feeding: breastfeeding; Pump received  - Pediatrician: established -     - RTO in 4 weeks or sooner if needed    USE THIS FOR OVERVIEW? (Can be edited in the \"Problem list\"  Problem List          Surgery/Wound/Pain    Headache       Obstetrics/Gynecology    Prenatal care, subsequent pregnancy in second trimester    Obesity affecting pregnancy in second trimester    Hx of preeclampsia, prior pregnancy, currently pregnant, second trimester    History of  delivery, currently pregnant    Overview   Was on vag P and got cervical length screening in last pregnancy. Was not yet offered either this time.     First cervical length at 20 weeks with MFM  f/u at 22 weeks for cervical length  defers vaginal progesterone at this time         21 weeks gestation of pregnancy       Other " "   Obesity, Class I, BMI 30-34.9       USE THIS FOR \"DAN\" CHARTING (can be edited in the note)  Assessment & Plan        Future Appointments   Date Time Provider Department Center   2025  3:30 PM YULIYA Chew [facility]   2025  8:30 AM Alisia Viera MD [facility]   2025 11:00 AM YULIYA Singh [facility]   2025 11:15 AM Reshma Lovell MD [facility]   2025 10:00 AM  [facility] [address] [place] [address]   2025 11:00 AM YULIYA Singh [facility]   10/9/2025  3:15 PM Makeda Lerma MD [facility]   10/14/2025 11:15 AM YULIYA Singh [facility]   10/20/2025 10:00 AM Makeda Lerma MD [facility]   10/27/2025 11:00 AM Regine Estrada MD [facility]   11/3/2025 11:15 AM Regine Estrada MD [facility]   11/10/2025 11:00 AM Regine Estrada MD [facility]         YULIYA Chew  2025  3:00 PM              "

## 2025-08-08 ENCOUNTER — APPOINTMENT (OUTPATIENT)
Dept: LAB | Facility: CLINIC | Age: 30
End: 2025-08-08
Attending: NURSE PRACTITIONER
Payer: COMMERCIAL

## 2025-08-08 LAB
BASOPHILS # BLD AUTO: 0.02 THOUSANDS/ÂΜL (ref 0–0.1)
BASOPHILS NFR BLD AUTO: 0 % (ref 0–1)
EOSINOPHIL # BLD AUTO: 0.06 THOUSAND/ÂΜL (ref 0–0.61)
EOSINOPHIL NFR BLD AUTO: 1 % (ref 0–6)
ERYTHROCYTE [DISTWIDTH] IN BLOOD BY AUTOMATED COUNT: 13 % (ref 11.6–15.1)
GLUCOSE 1H P 50 G GLC PO SERPL-MCNC: 105 MG/DL (ref 70–134)
HCT VFR BLD AUTO: 35.5 % (ref 34.8–46.1)
HGB BLD-MCNC: 11.9 G/DL (ref 11.5–15.4)
IMM GRANULOCYTES # BLD AUTO: 0.02 THOUSAND/UL (ref 0–0.2)
IMM GRANULOCYTES NFR BLD AUTO: 0 % (ref 0–2)
LYMPHOCYTES # BLD AUTO: 0.63 THOUSANDS/ÂΜL (ref 0.6–4.47)
LYMPHOCYTES NFR BLD AUTO: 9 % (ref 14–44)
MCH RBC QN AUTO: 32.7 PG (ref 26.8–34.3)
MCHC RBC AUTO-ENTMCNC: 33.5 G/DL (ref 31.4–37.4)
MCV RBC AUTO: 98 FL (ref 82–98)
MONOCYTES # BLD AUTO: 0.37 THOUSAND/ÂΜL (ref 0.17–1.22)
MONOCYTES NFR BLD AUTO: 5 % (ref 4–12)
NEUTROPHILS # BLD AUTO: 5.89 THOUSANDS/ÂΜL (ref 1.85–7.62)
NEUTS SEG NFR BLD AUTO: 85 % (ref 43–75)
NRBC BLD AUTO-RTO: 0 /100 WBCS
PLATELET # BLD AUTO: 183 THOUSANDS/UL (ref 149–390)
PMV BLD AUTO: 9.7 FL (ref 8.9–12.7)
RBC # BLD AUTO: 3.64 MILLION/UL (ref 3.81–5.12)
WBC # BLD AUTO: 6.99 THOUSAND/UL (ref 4.31–10.16)

## 2025-08-14 ENCOUNTER — ROUTINE PRENATAL (OUTPATIENT)
Dept: OBGYN CLINIC | Facility: CLINIC | Age: 30
End: 2025-08-14

## 2025-08-26 PROBLEM — Z34.83 PRENATAL CARE, SUBSEQUENT PREGNANCY IN THIRD TRIMESTER: Status: ACTIVE | Noted: 2025-04-30
